# Patient Record
Sex: MALE | Race: WHITE | NOT HISPANIC OR LATINO | Employment: FULL TIME | URBAN - METROPOLITAN AREA
[De-identification: names, ages, dates, MRNs, and addresses within clinical notes are randomized per-mention and may not be internally consistent; named-entity substitution may affect disease eponyms.]

---

## 2019-07-30 ENCOUNTER — TELEPHONE (OUTPATIENT)
Dept: CARDIAC SURGERY | Facility: CLINIC | Age: 54
End: 2019-07-30

## 2019-07-30 ENCOUNTER — APPOINTMENT (OUTPATIENT)
Dept: CARDIOLOGY | Facility: HOSPITAL | Age: 54
End: 2019-07-30

## 2019-07-30 ENCOUNTER — HOSPITAL ENCOUNTER (INPATIENT)
Facility: HOSPITAL | Age: 54
LOS: 6 days | Discharge: HOME HEALTH | End: 2019-08-05
Attending: THORACIC SURGERY (CARDIOTHORACIC VASCULAR SURGERY) | Admitting: THORACIC SURGERY (CARDIOTHORACIC VASCULAR SURGERY)

## 2019-07-30 ENCOUNTER — APPOINTMENT (OUTPATIENT)
Dept: GENERAL RADIOLOGY | Facility: HOSPITAL | Age: 54
End: 2019-07-30

## 2019-07-30 DIAGNOSIS — G47.33 OSA (OBSTRUCTIVE SLEEP APNEA): ICD-10-CM

## 2019-07-30 DIAGNOSIS — Z74.09 IMPAIRED MOBILITY: ICD-10-CM

## 2019-07-30 DIAGNOSIS — I25.110 CORONARY ARTERY DISEASE INVOLVING NATIVE CORONARY ARTERY OF NATIVE HEART WITH UNSTABLE ANGINA PECTORIS (HCC): Primary | ICD-10-CM

## 2019-07-30 PROBLEM — I25.10 CAD (CORONARY ARTERY DISEASE): Status: ACTIVE | Noted: 2019-07-30

## 2019-07-30 LAB
ABO GROUP BLD: NORMAL
ALBUMIN SERPL-MCNC: 3.8 G/DL (ref 3.5–5.2)
ALBUMIN/GLOB SERPL: 1.3 G/DL
ALP SERPL-CCNC: 63 U/L (ref 39–117)
ALT SERPL W P-5'-P-CCNC: 41 U/L (ref 1–41)
ANION GAP SERPL CALCULATED.3IONS-SCNC: 10.8 MMOL/L (ref 5–15)
APTT PPP: 41.5 SECONDS (ref 22.7–35.4)
APTT PPP: 48.8 SECONDS (ref 22.7–35.4)
ARTERIAL PATENCY WRIST A: ABNORMAL
AST SERPL-CCNC: 80 U/L (ref 1–40)
ATMOSPHERIC PRESS: 747.7 MMHG
BACTERIA UR QL AUTO: ABNORMAL /HPF
BASE EXCESS BLDA CALC-SCNC: -1.8 MMOL/L (ref 0–2)
BASOPHILS # BLD AUTO: 0.06 10*3/MM3 (ref 0–0.2)
BASOPHILS NFR BLD AUTO: 0.5 % (ref 0–1.5)
BDY SITE: ABNORMAL
BH CV XLRA MEAS - DIST GSV CALF DIST LEFT: 0.17 CM
BH CV XLRA MEAS - DIST GSV CALF DIST RIGHT: 0.18 CM
BH CV XLRA MEAS - DIST GSV THIGH DIST LEFT: 0.26 CM
BH CV XLRA MEAS - DIST GSV THIGH DIST RIGHT: 0.29 CM
BH CV XLRA MEAS - GSV ANKLE DIST LEFT: 0.17 CM
BH CV XLRA MEAS - GSV ANKLE DIST RIGHT: 0.27 CM
BH CV XLRA MEAS - GSV KNEE DIST LEFT: 0.2 CM
BH CV XLRA MEAS - GSV KNEE DIST RIGHT: 0.29 CM
BH CV XLRA MEAS - GSV ORIGIN DIST LEFT: 0.69 CM
BH CV XLRA MEAS - MID GSV CALF LEFT: 0.15 CM
BH CV XLRA MEAS - MID GSV CALF RIGHT: 0.17 CM
BH CV XLRA MEAS - MID GSV THIGH  LEFT: 0.21 CM
BH CV XLRA MEAS - MID GSV THIGH  RIGHT: 0.38 CM
BH CV XLRA MEAS - MID LSV CALF DIST LEFT: 0.11 CM
BH CV XLRA MEAS - MID LSV CALF DIST RIGHT: 0.06 CM
BH CV XLRA MEAS - PROX GSV CALF DIST LEFT: 0.17 CM
BH CV XLRA MEAS - PROX GSV CALF DIST RIGHT: 0.21 CM
BH CV XLRA MEAS - PROX GSV THIGH  LEFT: 0.35 CM
BH CV XLRA MEAS - PROX GSV THIGH  RIGHT: 0.52 CM
BH CV XLRA MEAS - PROX LSV CALF DIST LEFT: 0.14 CM
BH CV XLRA MEAS - PROX LSV CALF DIST RIGHT: 0.15 CM
BILIRUB SERPL-MCNC: 0.5 MG/DL (ref 0.2–1.2)
BILIRUB UR QL STRIP: NEGATIVE
BLD GP AB SCN SERPL QL: NEGATIVE
BUN BLD-MCNC: 9 MG/DL (ref 6–20)
BUN/CREAT SERPL: 10.5 (ref 7–25)
CALCIUM SPEC-SCNC: 8.7 MG/DL (ref 8.6–10.5)
CHLORIDE SERPL-SCNC: 105 MMOL/L (ref 98–107)
CHOLEST SERPL-MCNC: 213 MG/DL (ref 0–200)
CLARITY UR: CLEAR
CLOSE TME COLL+ADP + EPINEP PNL BLD: 93 %
CO2 SERPL-SCNC: 21.2 MMOL/L (ref 22–29)
COLOR UR: YELLOW
CREAT BLD-MCNC: 0.86 MG/DL (ref 0.76–1.27)
DEPRECATED RDW RBC AUTO: 43.3 FL (ref 37–54)
EOSINOPHIL # BLD AUTO: 0.03 10*3/MM3 (ref 0–0.4)
EOSINOPHIL NFR BLD AUTO: 0.2 % (ref 0.3–6.2)
ERYTHROCYTE [DISTWIDTH] IN BLOOD BY AUTOMATED COUNT: 13.8 % (ref 12.3–15.4)
GFR SERPL CREATININE-BSD FRML MDRD: 93 ML/MIN/1.73
GLOBULIN UR ELPH-MCNC: 3 GM/DL
GLUCOSE BLD-MCNC: 111 MG/DL (ref 65–99)
GLUCOSE UR STRIP-MCNC: NEGATIVE MG/DL
HBA1C MFR BLD: 6.9 % (ref 4.8–5.6)
HCO3 BLDA-SCNC: 22.4 MMOL/L (ref 22–28)
HCT VFR BLD AUTO: 41.6 % (ref 37.5–51)
HDLC SERPL-MCNC: 36 MG/DL (ref 40–60)
HGB BLD-MCNC: 13.2 G/DL (ref 13–17.7)
HGB UR QL STRIP.AUTO: ABNORMAL
HOROWITZ INDEX BLD+IHG-RTO: 21 %
HYALINE CASTS UR QL AUTO: ABNORMAL /LPF
IMM GRANULOCYTES # BLD AUTO: 0.09 10*3/MM3 (ref 0–0.05)
IMM GRANULOCYTES NFR BLD AUTO: 0.7 % (ref 0–0.5)
INR PPP: 1.05 (ref 0.9–1.1)
KETONES UR QL STRIP: NEGATIVE
LDLC SERPL CALC-MCNC: 141 MG/DL (ref 0–100)
LDLC/HDLC SERPL: 3.92 {RATIO}
LEUKOCYTE ESTERASE UR QL STRIP.AUTO: NEGATIVE
LYMPHOCYTES # BLD AUTO: 1.66 10*3/MM3 (ref 0.7–3.1)
LYMPHOCYTES NFR BLD AUTO: 13.7 % (ref 19.6–45.3)
MAGNESIUM SERPL-MCNC: 2 MG/DL (ref 1.6–2.6)
MCH RBC QN AUTO: 27.3 PG (ref 26.6–33)
MCHC RBC AUTO-ENTMCNC: 31.7 G/DL (ref 31.5–35.7)
MCV RBC AUTO: 86 FL (ref 79–97)
MODALITY: ABNORMAL
MONOCYTES # BLD AUTO: 0.62 10*3/MM3 (ref 0.1–0.9)
MONOCYTES NFR BLD AUTO: 5.1 % (ref 5–12)
NEUTROPHILS # BLD AUTO: 9.69 10*3/MM3 (ref 1.7–7)
NEUTROPHILS NFR BLD AUTO: 79.8 % (ref 42.7–76)
NITRITE UR QL STRIP: NEGATIVE
NRBC BLD AUTO-RTO: 0 /100 WBC (ref 0–0.2)
NT-PROBNP SERPL-MCNC: 152.5 PG/ML (ref 5–900)
O2 A-A PPRESDIFF RESPIRATORY: 0.7 MMHG
PCO2 BLDA: 35.9 MM HG (ref 35–45)
PH BLDA: 7.4 PH UNITS (ref 7.35–7.45)
PH UR STRIP.AUTO: <=5 [PH] (ref 5–8)
PLATELET # BLD AUTO: 260 10*3/MM3 (ref 140–450)
PMV BLD AUTO: 10.1 FL (ref 6–12)
PO2 BLDA: 77.6 MM HG (ref 80–100)
POTASSIUM BLD-SCNC: 4.4 MMOL/L (ref 3.5–5.2)
PROT SERPL-MCNC: 6.8 G/DL (ref 6–8.5)
PROT UR QL STRIP: NEGATIVE
PROTHROMBIN TIME: 13.4 SECONDS (ref 11.7–14.2)
RBC # BLD AUTO: 4.84 10*6/MM3 (ref 4.14–5.8)
RBC # UR: ABNORMAL /HPF
REF LAB TEST METHOD: ABNORMAL
RH BLD: POSITIVE
SAO2 % BLDCOA: 95.5 % (ref 92–99)
SODIUM BLD-SCNC: 137 MMOL/L (ref 136–145)
SP GR UR STRIP: >=1.03 (ref 1–1.03)
SQUAMOUS #/AREA URNS HPF: ABNORMAL /HPF
T&S EXPIRATION DATE: NORMAL
TOTAL RATE: 15 BREATHS/MINUTE
TRIGL SERPL-MCNC: 179 MG/DL (ref 0–150)
TROPONIN T SERPL-MCNC: 0.64 NG/ML (ref 0–0.03)
TROPONIN T SERPL-MCNC: 1.14 NG/ML (ref 0–0.03)
TSH SERPL DL<=0.05 MIU/L-ACNC: 1.17 MIU/ML (ref 0.27–4.2)
UROBILINOGEN UR QL STRIP: ABNORMAL
VLDLC SERPL-MCNC: 35.8 MG/DL (ref 5–40)
WBC NRBC COR # BLD: 12.15 10*3/MM3 (ref 3.4–10.8)
WBC UR QL AUTO: ABNORMAL /HPF

## 2019-07-30 PROCEDURE — 82803 BLOOD GASES ANY COMBINATION: CPT

## 2019-07-30 PROCEDURE — 25010000002 HEPARIN (PORCINE) PER 1000 UNITS: Performed by: PHYSICIAN ASSISTANT

## 2019-07-30 PROCEDURE — 86900 BLOOD TYPING SEROLOGIC ABO: CPT | Performed by: THORACIC SURGERY (CARDIOTHORACIC VASCULAR SURGERY)

## 2019-07-30 PROCEDURE — 85576 BLOOD PLATELET AGGREGATION: CPT | Performed by: THORACIC SURGERY (CARDIOTHORACIC VASCULAR SURGERY)

## 2019-07-30 PROCEDURE — 93970 EXTREMITY STUDY: CPT

## 2019-07-30 PROCEDURE — 99223 1ST HOSP IP/OBS HIGH 75: CPT | Performed by: PHYSICIAN ASSISTANT

## 2019-07-30 PROCEDURE — 25010000002 MORPHINE PER 10 MG: Performed by: THORACIC SURGERY (CARDIOTHORACIC VASCULAR SURGERY)

## 2019-07-30 PROCEDURE — 86901 BLOOD TYPING SEROLOGIC RH(D): CPT | Performed by: THORACIC SURGERY (CARDIOTHORACIC VASCULAR SURGERY)

## 2019-07-30 PROCEDURE — 84484 ASSAY OF TROPONIN QUANT: CPT | Performed by: THORACIC SURGERY (CARDIOTHORACIC VASCULAR SURGERY)

## 2019-07-30 PROCEDURE — 85730 THROMBOPLASTIN TIME PARTIAL: CPT | Performed by: THORACIC SURGERY (CARDIOTHORACIC VASCULAR SURGERY)

## 2019-07-30 PROCEDURE — 80053 COMPREHEN METABOLIC PANEL: CPT | Performed by: THORACIC SURGERY (CARDIOTHORACIC VASCULAR SURGERY)

## 2019-07-30 PROCEDURE — 83036 HEMOGLOBIN GLYCOSYLATED A1C: CPT | Performed by: THORACIC SURGERY (CARDIOTHORACIC VASCULAR SURGERY)

## 2019-07-30 PROCEDURE — 86920 COMPATIBILITY TEST SPIN: CPT

## 2019-07-30 PROCEDURE — 84443 ASSAY THYROID STIM HORMONE: CPT | Performed by: THORACIC SURGERY (CARDIOTHORACIC VASCULAR SURGERY)

## 2019-07-30 PROCEDURE — 93880 EXTRACRANIAL BILAT STUDY: CPT

## 2019-07-30 PROCEDURE — 85025 COMPLETE CBC W/AUTO DIFF WBC: CPT | Performed by: THORACIC SURGERY (CARDIOTHORACIC VASCULAR SURGERY)

## 2019-07-30 PROCEDURE — 71045 X-RAY EXAM CHEST 1 VIEW: CPT

## 2019-07-30 PROCEDURE — 83735 ASSAY OF MAGNESIUM: CPT | Performed by: THORACIC SURGERY (CARDIOTHORACIC VASCULAR SURGERY)

## 2019-07-30 PROCEDURE — 93005 ELECTROCARDIOGRAM TRACING: CPT | Performed by: THORACIC SURGERY (CARDIOTHORACIC VASCULAR SURGERY)

## 2019-07-30 PROCEDURE — 93010 ELECTROCARDIOGRAM REPORT: CPT | Performed by: INTERNAL MEDICINE

## 2019-07-30 PROCEDURE — 86850 RBC ANTIBODY SCREEN: CPT | Performed by: THORACIC SURGERY (CARDIOTHORACIC VASCULAR SURGERY)

## 2019-07-30 PROCEDURE — 80061 LIPID PANEL: CPT | Performed by: THORACIC SURGERY (CARDIOTHORACIC VASCULAR SURGERY)

## 2019-07-30 PROCEDURE — 36600 WITHDRAWAL OF ARTERIAL BLOOD: CPT

## 2019-07-30 PROCEDURE — 81001 URINALYSIS AUTO W/SCOPE: CPT | Performed by: THORACIC SURGERY (CARDIOTHORACIC VASCULAR SURGERY)

## 2019-07-30 PROCEDURE — 85610 PROTHROMBIN TIME: CPT | Performed by: THORACIC SURGERY (CARDIOTHORACIC VASCULAR SURGERY)

## 2019-07-30 PROCEDURE — 83880 ASSAY OF NATRIURETIC PEPTIDE: CPT | Performed by: THORACIC SURGERY (CARDIOTHORACIC VASCULAR SURGERY)

## 2019-07-30 PROCEDURE — 25010000002 HYDRALAZINE PER 20 MG: Performed by: PHYSICIAN ASSISTANT

## 2019-07-30 RX ORDER — HYDRALAZINE HYDROCHLORIDE 20 MG/ML
10 INJECTION INTRAMUSCULAR; INTRAVENOUS EVERY 4 HOURS PRN
Status: DISCONTINUED | OUTPATIENT
Start: 2019-07-30 | End: 2019-08-01

## 2019-07-30 RX ORDER — HYDRALAZINE HYDROCHLORIDE 20 MG/ML
20 INJECTION INTRAMUSCULAR; INTRAVENOUS EVERY 4 HOURS PRN
Status: DISCONTINUED | OUTPATIENT
Start: 2019-07-30 | End: 2019-08-01

## 2019-07-30 RX ORDER — NITROGLYCERIN 20 MG/100ML
10-50 INJECTION INTRAVENOUS
Status: DISCONTINUED | OUTPATIENT
Start: 2019-07-30 | End: 2019-08-01

## 2019-07-30 RX ORDER — ASPIRIN 325 MG
325 TABLET ORAL DAILY
Status: DISCONTINUED | OUTPATIENT
Start: 2019-07-30 | End: 2019-08-01

## 2019-07-30 RX ORDER — HEPARIN SODIUM 10000 [USP'U]/100ML
12 INJECTION, SOLUTION INTRAVENOUS
Status: DISCONTINUED | OUTPATIENT
Start: 2019-07-30 | End: 2019-08-01

## 2019-07-30 RX ORDER — ROSUVASTATIN CALCIUM 20 MG/1
20 TABLET, COATED ORAL NIGHTLY
Status: DISCONTINUED | OUTPATIENT
Start: 2019-07-30 | End: 2019-08-01

## 2019-07-30 RX ORDER — SENNA AND DOCUSATE SODIUM 50; 8.6 MG/1; MG/1
1 TABLET, FILM COATED ORAL NIGHTLY PRN
Status: DISCONTINUED | OUTPATIENT
Start: 2019-07-30 | End: 2019-08-01

## 2019-07-30 RX ORDER — SODIUM CHLORIDE 0.9 % (FLUSH) 0.9 %
3 SYRINGE (ML) INJECTION EVERY 12 HOURS SCHEDULED
Status: DISCONTINUED | OUTPATIENT
Start: 2019-07-30 | End: 2019-08-01

## 2019-07-30 RX ORDER — CALCIUM CARBONATE 200(500)MG
1 TABLET,CHEWABLE ORAL DAILY PRN
Status: DISCONTINUED | OUTPATIENT
Start: 2019-07-30 | End: 2019-08-01

## 2019-07-30 RX ORDER — ALPRAZOLAM 0.25 MG/1
0.25 TABLET ORAL EVERY 8 HOURS PRN
Status: DISCONTINUED | OUTPATIENT
Start: 2019-07-30 | End: 2019-08-01

## 2019-07-30 RX ORDER — TEMAZEPAM 15 MG/1
15 CAPSULE ORAL NIGHTLY PRN
Status: DISCONTINUED | OUTPATIENT
Start: 2019-07-30 | End: 2019-08-01

## 2019-07-30 RX ORDER — NITROGLYCERIN 0.4 MG/1
0.4 TABLET SUBLINGUAL
Status: DISCONTINUED | OUTPATIENT
Start: 2019-07-30 | End: 2019-08-01

## 2019-07-30 RX ORDER — ACETAMINOPHEN 325 MG/1
650 TABLET ORAL EVERY 4 HOURS PRN
Status: DISCONTINUED | OUTPATIENT
Start: 2019-07-30 | End: 2019-08-01

## 2019-07-30 RX ORDER — CHLORHEXIDINE GLUCONATE 0.12 MG/ML
15 RINSE ORAL EVERY 12 HOURS SCHEDULED
Status: DISPENSED | OUTPATIENT
Start: 2019-07-30 | End: 2019-07-31

## 2019-07-30 RX ORDER — MORPHINE SULFATE 2 MG/ML
1 INJECTION, SOLUTION INTRAMUSCULAR; INTRAVENOUS EVERY 4 HOURS PRN
Status: DISCONTINUED | OUTPATIENT
Start: 2019-07-30 | End: 2019-08-01

## 2019-07-30 RX ORDER — NALOXONE HCL 0.4 MG/ML
0.4 VIAL (ML) INJECTION
Status: DISCONTINUED | OUTPATIENT
Start: 2019-07-30 | End: 2019-08-01

## 2019-07-30 RX ORDER — METOPROLOL TARTRATE 50 MG/1
50 TABLET, FILM COATED ORAL EVERY 12 HOURS SCHEDULED
Status: DISCONTINUED | OUTPATIENT
Start: 2019-07-31 | End: 2019-07-31

## 2019-07-30 RX ORDER — SODIUM CHLORIDE 0.9 % (FLUSH) 0.9 %
3-10 SYRINGE (ML) INJECTION AS NEEDED
Status: DISCONTINUED | OUTPATIENT
Start: 2019-07-30 | End: 2019-08-01

## 2019-07-30 RX ORDER — ONDANSETRON 2 MG/ML
4 INJECTION INTRAMUSCULAR; INTRAVENOUS EVERY 6 HOURS PRN
Status: DISCONTINUED | OUTPATIENT
Start: 2019-07-30 | End: 2019-08-01

## 2019-07-30 RX ADMIN — HYDRALAZINE HYDROCHLORIDE 10 MG: 20 INJECTION INTRAMUSCULAR; INTRAVENOUS at 22:15

## 2019-07-30 RX ADMIN — METOPROLOL TARTRATE 25 MG: 25 TABLET ORAL at 18:40

## 2019-07-30 RX ADMIN — HEPARIN SODIUM 18 UNITS/KG/HR: 10000 INJECTION, SOLUTION INTRAVENOUS at 23:52

## 2019-07-30 RX ADMIN — CHLORHEXIDINE GLUCONATE 15 ML: 1.2 RINSE ORAL at 18:50

## 2019-07-30 RX ADMIN — HEPARIN SODIUM 12 UNITS/KG/HR: 10000 INJECTION, SOLUTION INTRAVENOUS at 16:10

## 2019-07-30 RX ADMIN — ROSUVASTATIN CALCIUM 20 MG: 20 TABLET, FILM COATED ORAL at 20:58

## 2019-07-30 RX ADMIN — SODIUM CHLORIDE, PRESERVATIVE FREE 3 ML: 5 INJECTION INTRAVENOUS at 20:58

## 2019-07-30 RX ADMIN — ALPRAZOLAM 0.25 MG: 0.25 TABLET ORAL at 23:20

## 2019-07-30 RX ADMIN — MORPHINE SULFATE 1 MG: 2 INJECTION, SOLUTION INTRAMUSCULAR; INTRAVENOUS at 23:05

## 2019-07-30 RX ADMIN — ASPIRIN 325 MG: 325 TABLET ORAL at 18:41

## 2019-07-30 RX ADMIN — HYDRALAZINE HYDROCHLORIDE 10 MG: 20 INJECTION INTRAMUSCULAR; INTRAVENOUS at 17:56

## 2019-07-30 RX ADMIN — NITROGLYCERIN 35 MCG/MIN: 20 INJECTION INTRAVENOUS at 16:09

## 2019-07-31 ENCOUNTER — ANESTHESIA EVENT (OUTPATIENT)
Dept: PERIOP | Facility: HOSPITAL | Age: 54
End: 2019-07-31

## 2019-07-31 LAB
ACT BLD: 114 SECONDS (ref 82–152)
ANION GAP SERPL CALCULATED.3IONS-SCNC: 12.8 MMOL/L (ref 5–15)
APTT PPP: 57.1 SECONDS (ref 22.7–35.4)
APTT PPP: 75.3 SECONDS (ref 22.7–35.4)
BASOPHILS # BLD AUTO: 0.07 10*3/MM3 (ref 0–0.2)
BASOPHILS NFR BLD AUTO: 0.4 % (ref 0–1.5)
BH CV XLRA MEAS LEFT CCA RATIO VEL: -93.8 CM/SEC
BH CV XLRA MEAS LEFT DIST CCA EDV: -25.2 CM/SEC
BH CV XLRA MEAS LEFT DIST CCA PSV: -93.8 CM/SEC
BH CV XLRA MEAS LEFT DIST ICA EDV: -30.1 CM/SEC
BH CV XLRA MEAS LEFT DIST ICA PSV: -73.5 CM/SEC
BH CV XLRA MEAS LEFT ICA RATIO VEL: -96.6 CM/SEC
BH CV XLRA MEAS LEFT ICA/CCA RATIO: 1
BH CV XLRA MEAS LEFT MID ICA EDV: -29.4 CM/SEC
BH CV XLRA MEAS LEFT MID ICA PSV: -82.2 CM/SEC
BH CV XLRA MEAS LEFT PROX CCA EDV: 25.1 CM/SEC
BH CV XLRA MEAS LEFT PROX CCA PSV: 111 CM/SEC
BH CV XLRA MEAS LEFT PROX ECA EDV: -17.3 CM/SEC
BH CV XLRA MEAS LEFT PROX ECA PSV: -121 CM/SEC
BH CV XLRA MEAS LEFT PROX ICA EDV: -25.1 CM/SEC
BH CV XLRA MEAS LEFT PROX ICA PSV: -96.6 CM/SEC
BH CV XLRA MEAS LEFT PROX SCLA PSV: 122 CM/SEC
BH CV XLRA MEAS LEFT VERTEBRAL A EDV: -12.2 CM/SEC
BH CV XLRA MEAS LEFT VERTEBRAL A PSV: -42.4 CM/SEC
BH CV XLRA MEAS RIGHT CCA RATIO VEL: -100 CM/SEC
BH CV XLRA MEAS RIGHT DIST CCA EDV: -24.6 CM/SEC
BH CV XLRA MEAS RIGHT DIST CCA PSV: -100 CM/SEC
BH CV XLRA MEAS RIGHT DIST ICA EDV: -12.2 CM/SEC
BH CV XLRA MEAS RIGHT DIST ICA PSV: -36.1 CM/SEC
BH CV XLRA MEAS RIGHT ICA RATIO VEL: -99.7 CM/SEC
BH CV XLRA MEAS RIGHT ICA/CCA RATIO: 1
BH CV XLRA MEAS RIGHT MID ICA EDV: -13 CM/SEC
BH CV XLRA MEAS RIGHT MID ICA PSV: -48.7 CM/SEC
BH CV XLRA MEAS RIGHT PROX CCA EDV: 18.2 CM/SEC
BH CV XLRA MEAS RIGHT PROX CCA PSV: 80.9 CM/SEC
BH CV XLRA MEAS RIGHT PROX ECA EDV: -17.6 CM/SEC
BH CV XLRA MEAS RIGHT PROX ECA PSV: -110 CM/SEC
BH CV XLRA MEAS RIGHT PROX ICA EDV: -21.7 CM/SEC
BH CV XLRA MEAS RIGHT PROX ICA PSV: -99.7 CM/SEC
BH CV XLRA MEAS RIGHT PROX SCLA PSV: 128 CM/SEC
BH CV XLRA MEAS RIGHT VERTEBRAL A EDV: 7.5 CM/SEC
BH CV XLRA MEAS RIGHT VERTEBRAL A PSV: 25.1 CM/SEC
BUN BLD-MCNC: 9 MG/DL (ref 6–20)
BUN/CREAT SERPL: 11 (ref 7–25)
CALCIUM SPEC-SCNC: 8.8 MG/DL (ref 8.6–10.5)
CHLORIDE SERPL-SCNC: 102 MMOL/L (ref 98–107)
CO2 SERPL-SCNC: 20.2 MMOL/L (ref 22–29)
CREAT BLD-MCNC: 0.82 MG/DL (ref 0.76–1.27)
DEPRECATED RDW RBC AUTO: 44.5 FL (ref 37–54)
EOSINOPHIL # BLD AUTO: 0.03 10*3/MM3 (ref 0–0.4)
EOSINOPHIL NFR BLD AUTO: 0.2 % (ref 0.3–6.2)
ERYTHROCYTE [DISTWIDTH] IN BLOOD BY AUTOMATED COUNT: 13.9 % (ref 12.3–15.4)
GFR SERPL CREATININE-BSD FRML MDRD: 98 ML/MIN/1.73
GLUCOSE BLD-MCNC: 140 MG/DL (ref 65–99)
HCT VFR BLD AUTO: 40.1 % (ref 37.5–51)
HGB BLD-MCNC: 12.7 G/DL (ref 13–17.7)
IMM GRANULOCYTES # BLD AUTO: 0.08 10*3/MM3 (ref 0–0.05)
IMM GRANULOCYTES NFR BLD AUTO: 0.5 % (ref 0–0.5)
LYMPHOCYTES # BLD AUTO: 1.93 10*3/MM3 (ref 0.7–3.1)
LYMPHOCYTES NFR BLD AUTO: 11.5 % (ref 19.6–45.3)
MCH RBC QN AUTO: 27.8 PG (ref 26.6–33)
MCHC RBC AUTO-ENTMCNC: 31.7 G/DL (ref 31.5–35.7)
MCV RBC AUTO: 87.7 FL (ref 79–97)
MONOCYTES # BLD AUTO: 1.1 10*3/MM3 (ref 0.1–0.9)
MONOCYTES NFR BLD AUTO: 6.5 % (ref 5–12)
NEUTROPHILS # BLD AUTO: 13.59 10*3/MM3 (ref 1.7–7)
NEUTROPHILS NFR BLD AUTO: 80.9 % (ref 42.7–76)
NRBC BLD AUTO-RTO: 0 /100 WBC (ref 0–0.2)
PLATELET # BLD AUTO: 245 10*3/MM3 (ref 140–450)
PMV BLD AUTO: 10.1 FL (ref 6–12)
POTASSIUM BLD-SCNC: 4.1 MMOL/L (ref 3.5–5.2)
RBC # BLD AUTO: 4.57 10*6/MM3 (ref 4.14–5.8)
RIGHT ARM BP: NORMAL MMHG
SODIUM BLD-SCNC: 135 MMOL/L (ref 136–145)
TROPONIN T SERPL-MCNC: 1.69 NG/ML (ref 0–0.03)
TROPONIN T SERPL-MCNC: 1.83 NG/ML (ref 0–0.03)
WBC NRBC COR # BLD: 16.8 10*3/MM3 (ref 3.4–10.8)

## 2019-07-31 PROCEDURE — 99024 POSTOP FOLLOW-UP VISIT: CPT | Performed by: THORACIC SURGERY (CARDIOTHORACIC VASCULAR SURGERY)

## 2019-07-31 PROCEDURE — 25010000002 HYDRALAZINE PER 20 MG: Performed by: INTERNAL MEDICINE

## 2019-07-31 PROCEDURE — 85347 COAGULATION TIME ACTIVATED: CPT

## 2019-07-31 PROCEDURE — 84484 ASSAY OF TROPONIN QUANT: CPT | Performed by: THORACIC SURGERY (CARDIOTHORACIC VASCULAR SURGERY)

## 2019-07-31 PROCEDURE — 99255 IP/OBS CONSLTJ NEW/EST HI 80: CPT | Performed by: INTERNAL MEDICINE

## 2019-07-31 PROCEDURE — 25010000002 MORPHINE PER 10 MG: Performed by: THORACIC SURGERY (CARDIOTHORACIC VASCULAR SURGERY)

## 2019-07-31 PROCEDURE — 85730 THROMBOPLASTIN TIME PARTIAL: CPT | Performed by: THORACIC SURGERY (CARDIOTHORACIC VASCULAR SURGERY)

## 2019-07-31 PROCEDURE — 85025 COMPLETE CBC W/AUTO DIFF WBC: CPT | Performed by: PHYSICIAN ASSISTANT

## 2019-07-31 PROCEDURE — 80048 BASIC METABOLIC PNL TOTAL CA: CPT | Performed by: THORACIC SURGERY (CARDIOTHORACIC VASCULAR SURGERY)

## 2019-07-31 PROCEDURE — 25010000002 HEPARIN (PORCINE) PER 1000 UNITS: Performed by: PHYSICIAN ASSISTANT

## 2019-07-31 RX ORDER — METOPROLOL TARTRATE 100 MG/1
100 TABLET ORAL EVERY 12 HOURS SCHEDULED
Status: DISCONTINUED | OUTPATIENT
Start: 2019-07-31 | End: 2019-08-01

## 2019-07-31 RX ORDER — MORPHINE SULFATE 2 MG/ML
2 INJECTION, SOLUTION INTRAMUSCULAR; INTRAVENOUS ONCE
Status: COMPLETED | OUTPATIENT
Start: 2019-07-31 | End: 2019-07-31

## 2019-07-31 RX ORDER — METOPROLOL TARTRATE 50 MG/1
50 TABLET, FILM COATED ORAL ONCE
Status: COMPLETED | OUTPATIENT
Start: 2019-07-31 | End: 2019-07-31

## 2019-07-31 RX ADMIN — MORPHINE SULFATE 2 MG: 2 INJECTION, SOLUTION INTRAMUSCULAR; INTRAVENOUS at 00:51

## 2019-07-31 RX ADMIN — METOPROLOL TARTRATE 100 MG: 100 TABLET, FILM COATED ORAL at 20:59

## 2019-07-31 RX ADMIN — TEMAZEPAM 15 MG: 15 CAPSULE ORAL at 00:50

## 2019-07-31 RX ADMIN — HEPARIN SODIUM 18 UNITS/KG/HR: 10000 INJECTION, SOLUTION INTRAVENOUS at 18:01

## 2019-07-31 RX ADMIN — NITROGLYCERIN 25 MCG/MIN: 20 INJECTION INTRAVENOUS at 17:19

## 2019-07-31 RX ADMIN — ALPRAZOLAM 0.25 MG: 0.25 TABLET ORAL at 21:26

## 2019-07-31 RX ADMIN — Medication 1 TABLET: at 06:24

## 2019-07-31 RX ADMIN — ROSUVASTATIN CALCIUM 20 MG: 20 TABLET, FILM COATED ORAL at 20:59

## 2019-07-31 RX ADMIN — METOPROLOL TARTRATE 50 MG: 50 TABLET, FILM COATED ORAL at 14:42

## 2019-07-31 RX ADMIN — METOPROLOL TARTRATE 50 MG: 50 TABLET, FILM COATED ORAL at 00:50

## 2019-07-31 RX ADMIN — METOPROLOL TARTRATE 50 MG: 50 TABLET, FILM COATED ORAL at 08:18

## 2019-07-31 RX ADMIN — HYDRALAZINE HYDROCHLORIDE 20 MG: 20 INJECTION INTRAMUSCULAR; INTRAVENOUS at 17:19

## 2019-07-31 RX ADMIN — ASPIRIN 325 MG: 325 TABLET ORAL at 08:18

## 2019-07-31 RX ADMIN — SODIUM CHLORIDE, PRESERVATIVE FREE 3 ML: 5 INJECTION INTRAVENOUS at 21:10

## 2019-07-31 RX ADMIN — TEMAZEPAM 15 MG: 15 CAPSULE ORAL at 21:26

## 2019-07-31 RX ADMIN — SODIUM CHLORIDE, PRESERVATIVE FREE 3 ML: 5 INJECTION INTRAVENOUS at 08:19

## 2019-08-01 ENCOUNTER — ANCILLARY PROCEDURE (OUTPATIENT)
Dept: PERIOP | Facility: HOSPITAL | Age: 54
End: 2019-08-01

## 2019-08-01 ENCOUNTER — ANESTHESIA (OUTPATIENT)
Dept: PERIOP | Facility: HOSPITAL | Age: 54
End: 2019-08-01

## 2019-08-01 ENCOUNTER — APPOINTMENT (OUTPATIENT)
Dept: GENERAL RADIOLOGY | Facility: HOSPITAL | Age: 54
End: 2019-08-01

## 2019-08-01 LAB
ACT BLD: 109 SECONDS (ref 82–152)
ACT BLD: 136 SECONDS (ref 82–152)
ACT BLD: 296 SECONDS (ref 82–152)
ACT BLD: 307 SECONDS (ref 82–152)
ACT BLD: 307 SECONDS (ref 82–152)
ACT BLD: 329 SECONDS (ref 82–152)
ACT BLD: 329 SECONDS (ref 82–152)
ACT BLD: 356 SECONDS (ref 82–152)
ALBUMIN SERPL-MCNC: 4.3 G/DL (ref 3.5–5.2)
ALBUMIN SERPL-MCNC: 4.6 G/DL (ref 3.5–5.2)
ANION GAP SERPL CALCULATED.3IONS-SCNC: 12.4 MMOL/L (ref 5–15)
ANION GAP SERPL CALCULATED.3IONS-SCNC: 16.2 MMOL/L (ref 5–15)
APTT PPP: 29 SECONDS (ref 22.7–35.4)
APTT PPP: 64 SECONDS (ref 22.7–35.4)
ARTERIAL PATENCY WRIST A: ABNORMAL
ATMOSPHERIC PRESS: 755.9 MMHG
ATMOSPHERIC PRESS: 757 MMHG
ATMOSPHERIC PRESS: 759.1 MMHG
BASE EXCESS BLDA CALC-SCNC: -1 MMOL/L (ref -5–5)
BASE EXCESS BLDA CALC-SCNC: -1.8 MMOL/L (ref 0–2)
BASE EXCESS BLDA CALC-SCNC: -2.7 MMOL/L (ref 0–2)
BASE EXCESS BLDA CALC-SCNC: -3 MMOL/L (ref -5–5)
BASE EXCESS BLDA CALC-SCNC: -4 MMOL/L (ref -5–5)
BASE EXCESS BLDA CALC-SCNC: -4 MMOL/L (ref -5–5)
BASE EXCESS BLDA CALC-SCNC: -6 MMOL/L (ref -5–5)
BASE EXCESS BLDA CALC-SCNC: -6.1 MMOL/L (ref 0–2)
BASE EXCESS BLDA CALC-SCNC: 0 MMOL/L (ref -5–5)
BASE EXCESS BLDA CALC-SCNC: 1 MMOL/L (ref -5–5)
BASE EXCESS BLDA CALC-SCNC: 1 MMOL/L (ref -5–5)
BASOPHILS # BLD AUTO: 0.06 10*3/MM3 (ref 0–0.2)
BASOPHILS # BLD AUTO: 0.11 10*3/MM3 (ref 0–0.2)
BASOPHILS NFR BLD AUTO: 0.3 % (ref 0–1.5)
BASOPHILS NFR BLD AUTO: 0.5 % (ref 0–1.5)
BDY SITE: ABNORMAL
BUN BLD-MCNC: 11 MG/DL (ref 6–20)
BUN BLD-MCNC: 13 MG/DL (ref 6–20)
BUN/CREAT SERPL: 7.6 (ref 7–25)
BUN/CREAT SERPL: 9.6 (ref 7–25)
CA-I BLD-MCNC: 5.4 MG/DL (ref 4.6–5.4)
CA-I SERPL ISE-MCNC: 1.35 MMOL/L (ref 1.15–1.35)
CALCIUM SPEC-SCNC: 8.8 MG/DL (ref 8.6–10.5)
CALCIUM SPEC-SCNC: 9.1 MG/DL (ref 8.6–10.5)
CHLORIDE SERPL-SCNC: 104 MMOL/L (ref 98–107)
CHLORIDE SERPL-SCNC: 99 MMOL/L (ref 98–107)
CO2 BLDA-SCNC: 23 MMOL/L (ref 24–29)
CO2 BLDA-SCNC: 24 MMOL/L (ref 24–29)
CO2 BLDA-SCNC: 25 MMOL/L (ref 24–29)
CO2 BLDA-SCNC: 25 MMOL/L (ref 24–29)
CO2 BLDA-SCNC: 27 MMOL/L (ref 24–29)
CO2 BLDA-SCNC: 27 MMOL/L (ref 24–29)
CO2 BLDA-SCNC: 28 MMOL/L (ref 24–29)
CO2 BLDA-SCNC: 29 MMOL/L (ref 24–29)
CO2 SERPL-SCNC: 21.6 MMOL/L (ref 22–29)
CO2 SERPL-SCNC: 21.8 MMOL/L (ref 22–29)
CREAT BLD-MCNC: 1.36 MG/DL (ref 0.76–1.27)
CREAT BLD-MCNC: 1.44 MG/DL (ref 0.76–1.27)
DEPRECATED RDW RBC AUTO: 44.7 FL (ref 37–54)
DEPRECATED RDW RBC AUTO: 45.8 FL (ref 37–54)
DEPRECATED RDW RBC AUTO: 46.1 FL (ref 37–54)
EOSINOPHIL # BLD AUTO: 0.11 10*3/MM3 (ref 0–0.4)
EOSINOPHIL # BLD AUTO: 0.15 10*3/MM3 (ref 0–0.4)
EOSINOPHIL NFR BLD AUTO: 0.4 % (ref 0.3–6.2)
EOSINOPHIL NFR BLD AUTO: 0.9 % (ref 0.3–6.2)
ERYTHROCYTE [DISTWIDTH] IN BLOOD BY AUTOMATED COUNT: 14 % (ref 12.3–15.4)
ERYTHROCYTE [DISTWIDTH] IN BLOOD BY AUTOMATED COUNT: 14 % (ref 12.3–15.4)
ERYTHROCYTE [DISTWIDTH] IN BLOOD BY AUTOMATED COUNT: 14.3 % (ref 12.3–15.4)
FIBRINOGEN PPP-MCNC: 353 MG/DL (ref 219–464)
GFR SERPL CREATININE-BSD FRML MDRD: 51 ML/MIN/1.73
GFR SERPL CREATININE-BSD FRML MDRD: 55 ML/MIN/1.73
GLUCOSE BLD-MCNC: 165 MG/DL (ref 65–99)
GLUCOSE BLD-MCNC: 190 MG/DL (ref 65–99)
GLUCOSE BLDC GLUCOMTR-MCNC: 134 MG/DL (ref 70–130)
GLUCOSE BLDC GLUCOMTR-MCNC: 138 MG/DL (ref 70–130)
GLUCOSE BLDC GLUCOMTR-MCNC: 144 MG/DL (ref 70–130)
GLUCOSE BLDC GLUCOMTR-MCNC: 150 MG/DL (ref 70–130)
GLUCOSE BLDC GLUCOMTR-MCNC: 150 MG/DL (ref 70–130)
GLUCOSE BLDC GLUCOMTR-MCNC: 151 MG/DL (ref 70–130)
GLUCOSE BLDC GLUCOMTR-MCNC: 161 MG/DL (ref 70–130)
GLUCOSE BLDC GLUCOMTR-MCNC: 161 MG/DL (ref 70–130)
GLUCOSE BLDC GLUCOMTR-MCNC: 162 MG/DL (ref 70–130)
GLUCOSE BLDC GLUCOMTR-MCNC: 164 MG/DL (ref 70–130)
GLUCOSE BLDC GLUCOMTR-MCNC: 165 MG/DL (ref 70–130)
GLUCOSE BLDC GLUCOMTR-MCNC: 165 MG/DL (ref 70–130)
GLUCOSE BLDC GLUCOMTR-MCNC: 168 MG/DL (ref 70–130)
GLUCOSE BLDC GLUCOMTR-MCNC: 171 MG/DL (ref 70–130)
GLUCOSE BLDC GLUCOMTR-MCNC: 176 MG/DL (ref 70–130)
GLUCOSE BLDC GLUCOMTR-MCNC: 180 MG/DL (ref 70–130)
GLUCOSE BLDC GLUCOMTR-MCNC: 182 MG/DL (ref 70–130)
GLUCOSE BLDC GLUCOMTR-MCNC: 183 MG/DL (ref 70–130)
GLUCOSE BLDC GLUCOMTR-MCNC: 184 MG/DL (ref 70–130)
GLUCOSE BLDC GLUCOMTR-MCNC: 194 MG/DL (ref 70–130)
HCO3 BLDA-SCNC: 20.4 MMOL/L (ref 22–28)
HCO3 BLDA-SCNC: 21.5 MMOL/L (ref 22–26)
HCO3 BLDA-SCNC: 22.3 MMOL/L (ref 22–26)
HCO3 BLDA-SCNC: 22.4 MMOL/L (ref 22–28)
HCO3 BLDA-SCNC: 22.5 MMOL/L (ref 22–28)
HCO3 BLDA-SCNC: 23.2 MMOL/L (ref 22–26)
HCO3 BLDA-SCNC: 23.9 MMOL/L (ref 22–26)
HCO3 BLDA-SCNC: 25.6 MMOL/L (ref 22–26)
HCO3 BLDA-SCNC: 26 MMOL/L (ref 22–26)
HCO3 BLDA-SCNC: 26.4 MMOL/L (ref 22–26)
HCO3 BLDA-SCNC: 27.1 MMOL/L (ref 22–26)
HCT VFR BLD AUTO: 35.3 % (ref 37.5–51)
HCT VFR BLD AUTO: 36.8 % (ref 37.5–51)
HCT VFR BLD AUTO: 37.6 % (ref 37.5–51)
HCT VFR BLDA CALC: 31 % (ref 38–51)
HCT VFR BLDA CALC: 32 % (ref 38–51)
HCT VFR BLDA CALC: 33 % (ref 38–51)
HCT VFR BLDA CALC: 37 % (ref 38–51)
HGB BLD-MCNC: 11.2 G/DL (ref 13–17.7)
HGB BLD-MCNC: 11.4 G/DL (ref 13–17.7)
HGB BLD-MCNC: 12.1 G/DL (ref 13–17.7)
HGB BLDA-MCNC: 10.5 G/DL (ref 12–17)
HGB BLDA-MCNC: 10.9 G/DL (ref 12–17)
HGB BLDA-MCNC: 11.2 G/DL (ref 12–17)
HGB BLDA-MCNC: 12.6 G/DL (ref 12–17)
HOROWITZ INDEX BLD+IHG-RTO: 100 %
HOROWITZ INDEX BLD+IHG-RTO: 50 %
HOROWITZ INDEX BLD+IHG-RTO: 60 %
IMM GRANULOCYTES # BLD AUTO: 0.1 10*3/MM3 (ref 0–0.05)
IMM GRANULOCYTES NFR BLD AUTO: 0.8 % (ref 0–0.5)
INR PPP: 1.37 (ref 0.9–1.1)
LYMPHOCYTES # BLD AUTO: 1.93 10*3/MM3 (ref 0.7–3.1)
LYMPHOCYTES # BLD AUTO: 4.95 10*3/MM3 (ref 0.7–3.1)
LYMPHOCYTES NFR BLD AUTO: 14.7 % (ref 19.6–45.3)
LYMPHOCYTES NFR BLD AUTO: 15.6 % (ref 19.6–45.3)
MAGNESIUM SERPL-MCNC: 2.7 MG/DL (ref 1.6–2.6)
MAGNESIUM SERPL-MCNC: 3.1 MG/DL (ref 1.6–2.6)
MCH RBC QN AUTO: 27.6 PG (ref 26.6–33)
MCH RBC QN AUTO: 28.1 PG (ref 26.6–33)
MCH RBC QN AUTO: 28.4 PG (ref 26.6–33)
MCHC RBC AUTO-ENTMCNC: 31 G/DL (ref 31.5–35.7)
MCHC RBC AUTO-ENTMCNC: 31.2 G/DL (ref 31.5–35.7)
MCHC RBC AUTO-ENTMCNC: 32.2 G/DL (ref 31.5–35.7)
MCV RBC AUTO: 87.4 FL (ref 79–97)
MCV RBC AUTO: 89.1 FL (ref 79–97)
MCV RBC AUTO: 89.4 FL (ref 79–97)
MODALITY: ABNORMAL
MONOCYTES # BLD AUTO: 1.12 10*3/MM3 (ref 0.1–0.9)
MONOCYTES # BLD AUTO: 2.53 10*3/MM3 (ref 0.1–0.9)
MONOCYTES NFR BLD AUTO: 7.5 % (ref 5–12)
MONOCYTES NFR BLD AUTO: 9 % (ref 5–12)
NEUTROPHILS # BLD AUTO: 25.24 10*3/MM3 (ref 1.7–7)
NEUTROPHILS # BLD AUTO: 9.08 10*3/MM3 (ref 1.7–7)
NEUTROPHILS NFR BLD AUTO: 73.2 % (ref 42.7–76)
NEUTROPHILS NFR BLD AUTO: 75 % (ref 42.7–76)
NRBC BLD AUTO-RTO: 0 /100 WBC (ref 0–0.2)
O2 A-A PPRESDIFF RESPIRATORY: 0.2 MMHG
O2 A-A PPRESDIFF RESPIRATORY: 0.2 MMHG
O2 A-A PPRESDIFF RESPIRATORY: 0.3 MMHG
PCO2 BLDA: 35.9 MM HG (ref 35–45)
PCO2 BLDA: 39 MM HG (ref 35–45)
PCO2 BLDA: 43.1 MM HG (ref 35–45)
PCO2 BLDA: 44.9 MM HG (ref 35–45)
PCO2 BLDA: 46.3 MM HG (ref 35–45)
PCO2 BLDA: 49.7 MM HG (ref 35–45)
PCO2 BLDA: 50.7 MM HG (ref 35–45)
PCO2 BLDA: 51.2 MM HG (ref 35–45)
PCO2 BLDA: 54.8 MM HG (ref 35–45)
PCO2 BLDA: 55.2 MM HG (ref 35–45)
PCO2 BLDA: 56.1 MM HG (ref 35–45)
PEEP RESPIRATORY: 7.5 CM[H2O]
PH BLDA: 7.24 PH UNITS (ref 7.35–7.6)
PH BLDA: 7.26 PH UNITS (ref 7.35–7.6)
PH BLDA: 7.27 PH UNITS (ref 7.35–7.6)
PH BLDA: 7.28 PH UNITS (ref 7.35–7.45)
PH BLDA: 7.28 PH UNITS (ref 7.35–7.6)
PH BLDA: 7.28 PH UNITS (ref 7.35–7.6)
PH BLDA: 7.29 PH UNITS (ref 7.35–7.6)
PH BLDA: 7.3 PH UNITS (ref 7.35–7.6)
PH BLDA: 7.37 PH UNITS (ref 7.35–7.45)
PH BLDA: 7.37 PH UNITS (ref 7.35–7.6)
PH BLDA: 7.41 PH UNITS (ref 7.35–7.45)
PHOSPHATE SERPL-MCNC: 3.4 MG/DL (ref 2.5–4.5)
PHOSPHATE SERPL-MCNC: 3.5 MG/DL (ref 2.5–4.5)
PLAT MORPH BLD: NORMAL
PLATELET # BLD AUTO: 217 10*3/MM3 (ref 140–450)
PLATELET # BLD AUTO: 225 10*3/MM3 (ref 140–450)
PLATELET # BLD AUTO: 233 10*3/MM3 (ref 140–450)
PMV BLD AUTO: 10.2 FL (ref 6–12)
PMV BLD AUTO: 10.5 FL (ref 6–12)
PMV BLD AUTO: 10.6 FL (ref 6–12)
PO2 BLDA: 123.8 MM HG (ref 80–100)
PO2 BLDA: 303 MMHG (ref 80–105)
PO2 BLDA: 332 MMHG (ref 80–105)
PO2 BLDA: 398 MMHG (ref 80–105)
PO2 BLDA: 404 MMHG (ref 80–105)
PO2 BLDA: 422 MMHG (ref 80–105)
PO2 BLDA: 425 MMHG (ref 80–105)
PO2 BLDA: 44 MMHG (ref 80–105)
PO2 BLDA: 86.8 MM HG (ref 80–100)
PO2 BLDA: 87 MMHG (ref 80–105)
PO2 BLDA: 87.3 MM HG (ref 80–100)
POTASSIUM BLD-SCNC: 4.3 MMOL/L (ref 3.5–5.2)
POTASSIUM BLD-SCNC: 4.9 MMOL/L (ref 3.5–5.2)
POTASSIUM BLDA-SCNC: 3.9 MMOL/L (ref 3.5–4.9)
POTASSIUM BLDA-SCNC: 4.4 MMOL/L (ref 3.5–4.9)
POTASSIUM BLDA-SCNC: 4.7 MMOL/L (ref 3.5–4.9)
POTASSIUM BLDA-SCNC: 4.9 MMOL/L (ref 3.5–4.9)
POTASSIUM BLDA-SCNC: 5.1 MMOL/L (ref 3.5–4.9)
POTASSIUM BLDA-SCNC: 5.6 MMOL/L (ref 3.5–4.9)
POTASSIUM BLDA-SCNC: 6.1 MMOL/L (ref 3.5–4.9)
POTASSIUM BLDA-SCNC: 6.6 MMOL/L (ref 3.5–4.9)
PROTHROMBIN TIME: 16.5 SECONDS (ref 11.7–14.2)
RBC # BLD AUTO: 3.95 10*6/MM3 (ref 4.14–5.8)
RBC # BLD AUTO: 4.13 10*6/MM3 (ref 4.14–5.8)
RBC # BLD AUTO: 4.3 10*6/MM3 (ref 4.14–5.8)
RBC MORPH BLD: NORMAL
SAO2 % BLDA: 100 % (ref 95–98)
SAO2 % BLDA: 73 % (ref 95–98)
SAO2 % BLDA: 95 % (ref 95–98)
SAO2 % BLDCOA: 96.4 % (ref 92–99)
SAO2 % BLDCOA: 96.7 % (ref 92–99)
SAO2 % BLDCOA: 98.3 % (ref 92–99)
SET MECH RESP RATE: 14
SET MECH RESP RATE: 16
SET MECH RESP RATE: 16
SODIUM BLD-SCNC: 133 MMOL/L (ref 136–145)
SODIUM BLD-SCNC: 142 MMOL/L (ref 136–145)
TOTAL RATE: 14 BREATHS/MINUTE
TOTAL RATE: 16 BREATHS/MINUTE
TOTAL RATE: 16 BREATHS/MINUTE
VENTILATOR MODE: ABNORMAL
VENTILATOR MODE: ABNORMAL
VENTILATOR MODE: AC
VT ON VENT VENT: 700 ML
WBC MORPH BLD: NORMAL
WBC NRBC COR # BLD: 12.4 10*3/MM3 (ref 3.4–10.8)
WBC NRBC COR # BLD: 24.09 10*3/MM3 (ref 3.4–10.8)
WBC NRBC COR # BLD: 34.32 10*3/MM3 (ref 3.4–10.8)

## 2019-08-01 PROCEDURE — P9041 ALBUMIN (HUMAN),5%, 50ML: HCPCS | Performed by: ANESTHESIOLOGY

## 2019-08-01 PROCEDURE — 33508 ENDOSCOPIC VEIN HARVEST: CPT | Performed by: THORACIC SURGERY (CARDIOTHORACIC VASCULAR SURGERY)

## 2019-08-01 PROCEDURE — 86900 BLOOD TYPING SEROLOGIC ABO: CPT

## 2019-08-01 PROCEDURE — 93005 ELECTROCARDIOGRAM TRACING: CPT | Performed by: THORACIC SURGERY (CARDIOTHORACIC VASCULAR SURGERY)

## 2019-08-01 PROCEDURE — 5A1221Z PERFORMANCE OF CARDIAC OUTPUT, CONTINUOUS: ICD-10-PCS | Performed by: THORACIC SURGERY (CARDIOTHORACIC VASCULAR SURGERY)

## 2019-08-01 PROCEDURE — 02100Z9 BYPASS CORONARY ARTERY, ONE ARTERY FROM LEFT INTERNAL MAMMARY, OPEN APPROACH: ICD-10-PCS | Performed by: THORACIC SURGERY (CARDIOTHORACIC VASCULAR SURGERY)

## 2019-08-01 PROCEDURE — 93318 ECHO TRANSESOPHAGEAL INTRAOP: CPT

## 2019-08-01 PROCEDURE — 25010000002 PROPOFOL 10 MG/ML EMULSION: Performed by: ANESTHESIOLOGY

## 2019-08-01 PROCEDURE — 25010000002 MAGNESIUM SULFATE PER 500 MG OF MAGNESIUM: Performed by: ANESTHESIOLOGY

## 2019-08-01 PROCEDURE — 25010000002 FUROSEMIDE PER 20 MG

## 2019-08-01 PROCEDURE — 85347 COAGULATION TIME ACTIVATED: CPT

## 2019-08-01 PROCEDURE — 25010000002 ALBUMIN HUMAN 5% PER 50 ML: Performed by: THORACIC SURGERY (CARDIOTHORACIC VASCULAR SURGERY)

## 2019-08-01 PROCEDURE — 25010000002 AMIODARONE IN DEXTROSE 5% 360-4.14 MG/200ML-% SOLUTION: Performed by: THORACIC SURGERY (CARDIOTHORACIC VASCULAR SURGERY)

## 2019-08-01 PROCEDURE — 94799 UNLISTED PULMONARY SVC/PX: CPT

## 2019-08-01 PROCEDURE — 82803 BLOOD GASES ANY COMBINATION: CPT

## 2019-08-01 PROCEDURE — 85014 HEMATOCRIT: CPT

## 2019-08-01 PROCEDURE — B24BZZ4 ULTRASONOGRAPHY OF HEART WITH AORTA, TRANSESOPHAGEAL: ICD-10-PCS | Performed by: ANESTHESIOLOGY

## 2019-08-01 PROCEDURE — 06BQ4ZZ EXCISION OF LEFT SAPHENOUS VEIN, PERCUTANEOUS ENDOSCOPIC APPROACH: ICD-10-PCS | Performed by: THORACIC SURGERY (CARDIOTHORACIC VASCULAR SURGERY)

## 2019-08-01 PROCEDURE — P9047 ALBUMIN (HUMAN), 25%, 50ML: HCPCS

## 2019-08-01 PROCEDURE — 85025 COMPLETE CBC W/AUTO DIFF WBC: CPT | Performed by: PHYSICIAN ASSISTANT

## 2019-08-01 PROCEDURE — 33521 CABG ARTERY-VEIN FOUR: CPT | Performed by: THORACIC SURGERY (CARDIOTHORACIC VASCULAR SURGERY)

## 2019-08-01 PROCEDURE — 80069 RENAL FUNCTION PANEL: CPT | Performed by: THORACIC SURGERY (CARDIOTHORACIC VASCULAR SURGERY)

## 2019-08-01 PROCEDURE — 71045 X-RAY EXAM CHEST 1 VIEW: CPT

## 2019-08-01 PROCEDURE — 25010000002 ALBUMIN HUMAN 5% PER 50 ML: Performed by: ANESTHESIOLOGY

## 2019-08-01 PROCEDURE — 83735 ASSAY OF MAGNESIUM: CPT | Performed by: THORACIC SURGERY (CARDIOTHORACIC VASCULAR SURGERY)

## 2019-08-01 PROCEDURE — 93010 ELECTROCARDIOGRAM REPORT: CPT | Performed by: INTERNAL MEDICINE

## 2019-08-01 PROCEDURE — 33508 ENDOSCOPIC VEIN HARVEST: CPT | Performed by: SPECIALIST/TECHNOLOGIST, OTHER

## 2019-08-01 PROCEDURE — 94002 VENT MGMT INPAT INIT DAY: CPT

## 2019-08-01 PROCEDURE — 33533 CABG ARTERIAL SINGLE: CPT | Performed by: SPECIALIST/TECHNOLOGIST, OTHER

## 2019-08-01 PROCEDURE — 25010000002 HEPARIN (PORCINE) PER 1000 UNITS

## 2019-08-01 PROCEDURE — 82330 ASSAY OF CALCIUM: CPT | Performed by: THORACIC SURGERY (CARDIOTHORACIC VASCULAR SURGERY)

## 2019-08-01 PROCEDURE — P9041 ALBUMIN (HUMAN),5%, 50ML: HCPCS | Performed by: THORACIC SURGERY (CARDIOTHORACIC VASCULAR SURGERY)

## 2019-08-01 PROCEDURE — 25010000002 ALBUMIN HUMAN 25% PER 50 ML

## 2019-08-01 PROCEDURE — 63710000001 INSULIN REGULAR HUMAN PER 5 UNITS: Performed by: ANESTHESIOLOGY

## 2019-08-01 PROCEDURE — 25010000002 ONDANSETRON PER 1 MG: Performed by: ANESTHESIOLOGY

## 2019-08-01 PROCEDURE — 25010000002 HYDROMORPHONE PER 4 MG: Performed by: ANESTHESIOLOGY

## 2019-08-01 PROCEDURE — 25010000002 METOCLOPRAMIDE PER 10 MG: Performed by: THORACIC SURGERY (CARDIOTHORACIC VASCULAR SURGERY)

## 2019-08-01 PROCEDURE — 85027 COMPLETE CBC AUTOMATED: CPT | Performed by: THORACIC SURGERY (CARDIOTHORACIC VASCULAR SURGERY)

## 2019-08-01 PROCEDURE — 25010000002 HEPARIN (PORCINE) PER 1000 UNITS: Performed by: ANESTHESIOLOGY

## 2019-08-01 PROCEDURE — C1751 CATH, INF, PER/CENT/MIDLINE: HCPCS | Performed by: ANESTHESIOLOGY

## 2019-08-01 PROCEDURE — A4648 IMPLANTABLE TISSUE MARKER: HCPCS | Performed by: THORACIC SURGERY (CARDIOTHORACIC VASCULAR SURGERY)

## 2019-08-01 PROCEDURE — 25010000002 PAPAVERINE PER 60 MG: Performed by: THORACIC SURGERY (CARDIOTHORACIC VASCULAR SURGERY)

## 2019-08-01 PROCEDURE — 86901 BLOOD TYPING SEROLOGIC RH(D): CPT

## 2019-08-01 PROCEDURE — 33521 CABG ARTERY-VEIN FOUR: CPT | Performed by: SPECIALIST/TECHNOLOGIST, OTHER

## 2019-08-01 PROCEDURE — 85007 BL SMEAR W/DIFF WBC COUNT: CPT | Performed by: THORACIC SURGERY (CARDIOTHORACIC VASCULAR SURGERY)

## 2019-08-01 PROCEDURE — 85018 HEMOGLOBIN: CPT

## 2019-08-01 PROCEDURE — 85610 PROTHROMBIN TIME: CPT | Performed by: THORACIC SURGERY (CARDIOTHORACIC VASCULAR SURGERY)

## 2019-08-01 PROCEDURE — 85384 FIBRINOGEN ACTIVITY: CPT | Performed by: THORACIC SURGERY (CARDIOTHORACIC VASCULAR SURGERY)

## 2019-08-01 PROCEDURE — 0213093 BYPASS CORONARY ARTERY, FOUR OR MORE ARTERIES FROM CORONARY ARTERY WITH AUTOLOGOUS VENOUS TISSUE, OPEN APPROACH: ICD-10-PCS | Performed by: THORACIC SURGERY (CARDIOTHORACIC VASCULAR SURGERY)

## 2019-08-01 PROCEDURE — 25010000002 PROTAMINE SULFATE PER 10 MG: Performed by: ANESTHESIOLOGY

## 2019-08-01 PROCEDURE — 25010000002 AMIODARONE IN DEXTROSE 5% 360-4.14 MG/200ML-% SOLUTION: Performed by: ANESTHESIOLOGY

## 2019-08-01 PROCEDURE — 25010000002 FENTANYL CITRATE (PF) 100 MCG/2ML SOLUTION: Performed by: ANESTHESIOLOGY

## 2019-08-01 PROCEDURE — C1729 CATH, DRAINAGE: HCPCS | Performed by: THORACIC SURGERY (CARDIOTHORACIC VASCULAR SURGERY)

## 2019-08-01 PROCEDURE — 25010000002 PIPERACILLIN SOD-TAZOBACTAM PER 1 G: Performed by: THORACIC SURGERY (CARDIOTHORACIC VASCULAR SURGERY)

## 2019-08-01 PROCEDURE — C1713 ANCHOR/SCREW BN/BN,TIS/BN: HCPCS | Performed by: THORACIC SURGERY (CARDIOTHORACIC VASCULAR SURGERY)

## 2019-08-01 PROCEDURE — 85025 COMPLETE CBC W/AUTO DIFF WBC: CPT | Performed by: THORACIC SURGERY (CARDIOTHORACIC VASCULAR SURGERY)

## 2019-08-01 PROCEDURE — 25010000002 EPINEPHRINE PER 0.1 MG: Performed by: ANESTHESIOLOGY

## 2019-08-01 PROCEDURE — 82947 ASSAY GLUCOSE BLOOD QUANT: CPT

## 2019-08-01 PROCEDURE — 25010000002 MIDAZOLAM PER 1 MG: Performed by: ANESTHESIOLOGY

## 2019-08-01 PROCEDURE — 93318 ECHO TRANSESOPHAGEAL INTRAOP: CPT | Performed by: ANESTHESIOLOGY

## 2019-08-01 PROCEDURE — 85730 THROMBOPLASTIN TIME PARTIAL: CPT | Performed by: THORACIC SURGERY (CARDIOTHORACIC VASCULAR SURGERY)

## 2019-08-01 PROCEDURE — 25010000002 PROPOFOL 1000 MG/ML EMULSION: Performed by: THORACIC SURGERY (CARDIOTHORACIC VASCULAR SURGERY)

## 2019-08-01 PROCEDURE — 25010000002 PHENYLEPHRINE PER 1 ML: Performed by: ANESTHESIOLOGY

## 2019-08-01 PROCEDURE — 25010000003 CEFAZOLIN PER 500 MG: Performed by: PHYSICIAN ASSISTANT

## 2019-08-01 PROCEDURE — 33533 CABG ARTERIAL SINGLE: CPT | Performed by: THORACIC SURGERY (CARDIOTHORACIC VASCULAR SURGERY)

## 2019-08-01 PROCEDURE — 25010000002 ONDANSETRON PER 1 MG: Performed by: THORACIC SURGERY (CARDIOTHORACIC VASCULAR SURGERY)

## 2019-08-01 PROCEDURE — 82962 GLUCOSE BLOOD TEST: CPT

## 2019-08-01 PROCEDURE — 99024 POSTOP FOLLOW-UP VISIT: CPT | Performed by: THORACIC SURGERY (CARDIOTHORACIC VASCULAR SURGERY)

## 2019-08-01 PROCEDURE — 25010000002 HEPARIN (PORCINE) PER 1000 UNITS: Performed by: THORACIC SURGERY (CARDIOTHORACIC VASCULAR SURGERY)

## 2019-08-01 DEVICE — SS SUTURE, 4 PER SLEEVE
Type: IMPLANTABLE DEVICE | Site: STERNUM | Status: FUNCTIONAL
Brand: MYO/WIRE II

## 2019-08-01 DEVICE — DISK-SHAPED STYLE, SILICONE (1 PER STERILE PKG)
Type: IMPLANTABLE DEVICE | Site: HEART | Status: FUNCTIONAL
Brand: SCANLAN® RADIOMARK® GRAFT MARKERS

## 2019-08-01 RX ORDER — MEPERIDINE HYDROCHLORIDE 25 MG/ML
25 INJECTION INTRAMUSCULAR; INTRAVENOUS; SUBCUTANEOUS EVERY 4 HOURS PRN
Status: DISCONTINUED | OUTPATIENT
Start: 2019-08-01 | End: 2019-08-02

## 2019-08-01 RX ORDER — MIDAZOLAM HYDROCHLORIDE 1 MG/ML
INJECTION INTRAMUSCULAR; INTRAVENOUS AS NEEDED
Status: DISCONTINUED | OUTPATIENT
Start: 2019-08-01 | End: 2019-08-01 | Stop reason: SURG

## 2019-08-01 RX ORDER — ATORVASTATIN CALCIUM 20 MG/1
40 TABLET, FILM COATED ORAL NIGHTLY
Status: DISCONTINUED | OUTPATIENT
Start: 2019-08-01 | End: 2019-08-05 | Stop reason: HOSPADM

## 2019-08-01 RX ORDER — CHLORHEXIDINE GLUCONATE 0.12 MG/ML
15 RINSE ORAL EVERY 12 HOURS
Status: DISCONTINUED | OUTPATIENT
Start: 2019-08-01 | End: 2019-08-03

## 2019-08-01 RX ORDER — DOPAMINE HYDROCHLORIDE 160 MG/100ML
2-20 INJECTION, SOLUTION INTRAVENOUS CONTINUOUS PRN
Status: DISCONTINUED | OUTPATIENT
Start: 2019-08-01 | End: 2019-08-01

## 2019-08-01 RX ORDER — POTASSIUM CHLORIDE 29.8 MG/ML
20 INJECTION INTRAVENOUS
Status: COMPLETED | OUTPATIENT
Start: 2019-08-01 | End: 2019-08-02

## 2019-08-01 RX ORDER — KETAMINE HYDROCHLORIDE 10 MG/ML
INJECTION INTRAMUSCULAR; INTRAVENOUS AS NEEDED
Status: DISCONTINUED | OUTPATIENT
Start: 2019-08-01 | End: 2019-08-01 | Stop reason: SURG

## 2019-08-01 RX ORDER — HEPARIN SODIUM 5000 [USP'U]/ML
INJECTION, SOLUTION INTRAVENOUS; SUBCUTANEOUS AS NEEDED
Status: DISCONTINUED | OUTPATIENT
Start: 2019-08-01 | End: 2019-08-01 | Stop reason: HOSPADM

## 2019-08-01 RX ORDER — ACETAMINOPHEN 325 MG/1
650 TABLET ORAL EVERY 4 HOURS
Status: DISPENSED | OUTPATIENT
Start: 2019-08-01 | End: 2019-08-02

## 2019-08-01 RX ORDER — BISACODYL 5 MG/1
10 TABLET, DELAYED RELEASE ORAL DAILY PRN
Status: DISCONTINUED | OUTPATIENT
Start: 2019-08-01 | End: 2019-08-05 | Stop reason: HOSPADM

## 2019-08-01 RX ORDER — ACETAMINOPHEN 650 MG/1
650 SUPPOSITORY RECTAL EVERY 4 HOURS
Status: ACTIVE | OUTPATIENT
Start: 2019-08-01 | End: 2019-08-02

## 2019-08-01 RX ORDER — FAMOTIDINE 10 MG/ML
20 INJECTION, SOLUTION INTRAVENOUS ONCE
Status: DISCONTINUED | OUTPATIENT
Start: 2019-08-01 | End: 2019-08-01 | Stop reason: HOSPADM

## 2019-08-01 RX ORDER — ROCURONIUM BROMIDE 10 MG/ML
INJECTION, SOLUTION INTRAVENOUS AS NEEDED
Status: DISCONTINUED | OUTPATIENT
Start: 2019-08-01 | End: 2019-08-01 | Stop reason: SURG

## 2019-08-01 RX ORDER — BISACODYL 10 MG
10 SUPPOSITORY, RECTAL RECTAL DAILY PRN
Status: DISCONTINUED | OUTPATIENT
Start: 2019-08-02 | End: 2019-08-05 | Stop reason: HOSPADM

## 2019-08-01 RX ORDER — SUFENTANIL CITRATE 50 UG/ML
INJECTION EPIDURAL; INTRAVENOUS AS NEEDED
Status: DISCONTINUED | OUTPATIENT
Start: 2019-08-01 | End: 2019-08-01 | Stop reason: SURG

## 2019-08-01 RX ORDER — PANTOPRAZOLE SODIUM 40 MG/1
40 TABLET, DELAYED RELEASE ORAL
Status: DISCONTINUED | OUTPATIENT
Start: 2019-08-02 | End: 2019-08-05 | Stop reason: HOSPADM

## 2019-08-01 RX ORDER — POTASSIUM CHLORIDE 7.45 MG/ML
10 INJECTION INTRAVENOUS
Status: DISCONTINUED | OUTPATIENT
Start: 2019-08-01 | End: 2019-08-05 | Stop reason: HOSPADM

## 2019-08-01 RX ORDER — CEFAZOLIN SODIUM IN 0.9 % NACL 3 G/100 ML
3 INTRAVENOUS SOLUTION, PIGGYBACK (ML) INTRAVENOUS EVERY 8 HOURS
Status: DISCONTINUED | OUTPATIENT
Start: 2019-08-01 | End: 2019-08-01

## 2019-08-01 RX ORDER — SODIUM CHLORIDE 9 MG/ML
30 INJECTION, SOLUTION INTRAVENOUS CONTINUOUS PRN
Status: DISCONTINUED | OUTPATIENT
Start: 2019-08-01 | End: 2019-08-02

## 2019-08-01 RX ORDER — MIDAZOLAM HYDROCHLORIDE 1 MG/ML
2 INJECTION INTRAMUSCULAR; INTRAVENOUS
Status: DISCONTINUED | OUTPATIENT
Start: 2019-08-01 | End: 2019-08-02

## 2019-08-01 RX ORDER — PROTAMINE SULFATE 10 MG/ML
INJECTION, SOLUTION INTRAVENOUS AS NEEDED
Status: DISCONTINUED | OUTPATIENT
Start: 2019-08-01 | End: 2019-08-01 | Stop reason: SURG

## 2019-08-01 RX ORDER — MAGNESIUM SULFATE 1 G/100ML
1 INJECTION INTRAVENOUS EVERY 8 HOURS
Status: ACTIVE | OUTPATIENT
Start: 2019-08-01 | End: 2019-08-02

## 2019-08-01 RX ORDER — CYCLOBENZAPRINE HCL 10 MG
10 TABLET ORAL EVERY 8 HOURS PRN
Status: DISCONTINUED | OUTPATIENT
Start: 2019-08-02 | End: 2019-08-01

## 2019-08-01 RX ORDER — ACETAMINOPHEN 160 MG/5ML
650 SOLUTION ORAL EVERY 4 HOURS
Status: DISPENSED | OUTPATIENT
Start: 2019-08-01 | End: 2019-08-02

## 2019-08-01 RX ORDER — FUROSEMIDE 10 MG/ML
40 INJECTION INTRAMUSCULAR; INTRAVENOUS EVERY 6 HOURS PRN
Status: COMPLETED | OUTPATIENT
Start: 2019-08-01 | End: 2019-08-02

## 2019-08-01 RX ORDER — MORPHINE SULFATE 2 MG/ML
4 INJECTION, SOLUTION INTRAMUSCULAR; INTRAVENOUS
Status: DISCONTINUED | OUTPATIENT
Start: 2019-08-01 | End: 2019-08-02

## 2019-08-01 RX ORDER — ONDANSETRON 2 MG/ML
4 INJECTION INTRAMUSCULAR; INTRAVENOUS EVERY 6 HOURS PRN
Status: DISCONTINUED | OUTPATIENT
Start: 2019-08-01 | End: 2019-08-05 | Stop reason: HOSPADM

## 2019-08-01 RX ORDER — ALPRAZOLAM 0.25 MG/1
0.25 TABLET ORAL EVERY 8 HOURS PRN
Status: DISCONTINUED | OUTPATIENT
Start: 2019-08-01 | End: 2019-08-05 | Stop reason: HOSPADM

## 2019-08-01 RX ORDER — SENNA AND DOCUSATE SODIUM 50; 8.6 MG/1; MG/1
2 TABLET, FILM COATED ORAL NIGHTLY
Status: DISCONTINUED | OUTPATIENT
Start: 2019-08-02 | End: 2019-08-05 | Stop reason: HOSPADM

## 2019-08-01 RX ORDER — SODIUM CHLORIDE 9 MG/ML
INJECTION, SOLUTION INTRAVENOUS CONTINUOUS PRN
Status: DISCONTINUED | OUTPATIENT
Start: 2019-08-01 | End: 2019-08-01 | Stop reason: SURG

## 2019-08-01 RX ORDER — NALOXONE HCL 0.4 MG/ML
0.4 VIAL (ML) INJECTION
Status: DISCONTINUED | OUTPATIENT
Start: 2019-08-01 | End: 2019-08-05 | Stop reason: HOSPADM

## 2019-08-01 RX ORDER — POTASSIUM CHLORIDE 1.5 G/1.77G
40 POWDER, FOR SOLUTION ORAL AS NEEDED
Status: DISCONTINUED | OUTPATIENT
Start: 2019-08-01 | End: 2019-08-05 | Stop reason: HOSPADM

## 2019-08-01 RX ORDER — NITROGLYCERIN 20 MG/100ML
INJECTION INTRAVENOUS CONTINUOUS PRN
Status: DISCONTINUED | OUTPATIENT
Start: 2019-08-01 | End: 2019-08-01 | Stop reason: SURG

## 2019-08-01 RX ORDER — NOREPINEPHRINE BITARTRATE 1 MG/ML
INJECTION, SOLUTION INTRAVENOUS CONTINUOUS PRN
Status: DISCONTINUED | OUTPATIENT
Start: 2019-08-01 | End: 2019-08-01 | Stop reason: SURG

## 2019-08-01 RX ORDER — POTASSIUM CHLORIDE 750 MG/1
40 CAPSULE, EXTENDED RELEASE ORAL AS NEEDED
Status: DISCONTINUED | OUTPATIENT
Start: 2019-08-01 | End: 2019-08-05 | Stop reason: HOSPADM

## 2019-08-01 RX ORDER — LIDOCAINE HYDROCHLORIDE 20 MG/ML
INJECTION, SOLUTION INFILTRATION; PERINEURAL AS NEEDED
Status: DISCONTINUED | OUTPATIENT
Start: 2019-08-01 | End: 2019-08-01 | Stop reason: SURG

## 2019-08-01 RX ORDER — SODIUM CHLORIDE 9 MG/ML
30 INJECTION, SOLUTION INTRAVENOUS CONTINUOUS
Status: DISCONTINUED | OUTPATIENT
Start: 2019-08-01 | End: 2019-08-02

## 2019-08-01 RX ORDER — ACETAMINOPHEN 650 MG/1
650 SUPPOSITORY RECTAL EVERY 4 HOURS PRN
Status: DISCONTINUED | OUTPATIENT
Start: 2019-08-02 | End: 2019-08-05 | Stop reason: HOSPADM

## 2019-08-01 RX ORDER — MAGNESIUM SULFATE HEPTAHYDRATE 500 MG/ML
INJECTION, SOLUTION INTRAMUSCULAR; INTRAVENOUS AS NEEDED
Status: DISCONTINUED | OUTPATIENT
Start: 2019-08-01 | End: 2019-08-01 | Stop reason: SURG

## 2019-08-01 RX ORDER — ALBUMIN, HUMAN INJ 5% 5 %
1500 SOLUTION INTRAVENOUS AS NEEDED
Status: DISPENSED | OUTPATIENT
Start: 2019-08-01 | End: 2019-08-02

## 2019-08-01 RX ORDER — HYDROCODONE BITARTRATE AND ACETAMINOPHEN 5; 325 MG/1; MG/1
2 TABLET ORAL EVERY 4 HOURS PRN
Status: DISCONTINUED | OUTPATIENT
Start: 2019-08-01 | End: 2019-08-05 | Stop reason: HOSPADM

## 2019-08-01 RX ORDER — FENTANYL CITRATE 50 UG/ML
INJECTION, SOLUTION INTRAMUSCULAR; INTRAVENOUS AS NEEDED
Status: DISCONTINUED | OUTPATIENT
Start: 2019-08-01 | End: 2019-08-01 | Stop reason: SURG

## 2019-08-01 RX ORDER — PROPOFOL 10 MG/ML
VIAL (ML) INTRAVENOUS AS NEEDED
Status: DISCONTINUED | OUTPATIENT
Start: 2019-08-01 | End: 2019-08-01 | Stop reason: SURG

## 2019-08-01 RX ORDER — POTASSIUM CHLORIDE 29.8 MG/ML
20 INJECTION INTRAVENOUS
Status: DISCONTINUED | OUTPATIENT
Start: 2019-08-01 | End: 2019-08-05 | Stop reason: HOSPADM

## 2019-08-01 RX ORDER — HEPARIN SODIUM 1000 [USP'U]/ML
INJECTION, SOLUTION INTRAVENOUS; SUBCUTANEOUS AS NEEDED
Status: DISCONTINUED | OUTPATIENT
Start: 2019-08-01 | End: 2019-08-01 | Stop reason: SURG

## 2019-08-01 RX ORDER — NITROGLYCERIN 20 MG/100ML
5-200 INJECTION INTRAVENOUS
Status: DISCONTINUED | OUTPATIENT
Start: 2019-08-01 | End: 2019-08-02

## 2019-08-01 RX ORDER — AMINOCAPROIC ACID 250 MG/ML
INJECTION, SOLUTION INTRAVENOUS AS NEEDED
Status: DISCONTINUED | OUTPATIENT
Start: 2019-08-01 | End: 2019-08-01 | Stop reason: SURG

## 2019-08-01 RX ORDER — MILRINONE LACTATE 0.2 MG/ML
.25-.75 INJECTION, SOLUTION INTRAVENOUS CONTINUOUS PRN
Status: DISCONTINUED | OUTPATIENT
Start: 2019-08-01 | End: 2019-08-01

## 2019-08-01 RX ORDER — DEXMEDETOMIDINE HYDROCHLORIDE 4 UG/ML
.2-1.5 INJECTION, SOLUTION INTRAVENOUS
Status: DISCONTINUED | OUTPATIENT
Start: 2019-08-01 | End: 2019-08-02

## 2019-08-01 RX ORDER — ALBUMIN, HUMAN INJ 5% 5 %
SOLUTION INTRAVENOUS CONTINUOUS PRN
Status: DISCONTINUED | OUTPATIENT
Start: 2019-08-01 | End: 2019-08-01 | Stop reason: SURG

## 2019-08-01 RX ORDER — METOCLOPRAMIDE HYDROCHLORIDE 5 MG/ML
10 INJECTION INTRAMUSCULAR; INTRAVENOUS EVERY 6 HOURS
Status: DISPENSED | OUTPATIENT
Start: 2019-08-01 | End: 2019-08-02

## 2019-08-01 RX ORDER — MORPHINE SULFATE 2 MG/ML
1 INJECTION, SOLUTION INTRAMUSCULAR; INTRAVENOUS EVERY 4 HOURS PRN
Status: DISCONTINUED | OUTPATIENT
Start: 2019-08-01 | End: 2019-08-05

## 2019-08-01 RX ORDER — OXYCODONE HYDROCHLORIDE 5 MG/1
10 TABLET ORAL EVERY 4 HOURS PRN
Status: DISCONTINUED | OUTPATIENT
Start: 2019-08-01 | End: 2019-08-05

## 2019-08-01 RX ORDER — ASPIRIN 325 MG
325 TABLET, DELAYED RELEASE (ENTERIC COATED) ORAL DAILY
Status: DISCONTINUED | OUTPATIENT
Start: 2019-08-02 | End: 2019-08-05 | Stop reason: HOSPADM

## 2019-08-01 RX ORDER — HYDROMORPHONE HCL 110MG/55ML
PATIENT CONTROLLED ANALGESIA SYRINGE INTRAVENOUS AS NEEDED
Status: DISCONTINUED | OUTPATIENT
Start: 2019-08-01 | End: 2019-08-01 | Stop reason: SURG

## 2019-08-01 RX ORDER — ACETAMINOPHEN 160 MG/5ML
650 SOLUTION ORAL EVERY 4 HOURS PRN
Status: DISCONTINUED | OUTPATIENT
Start: 2019-08-02 | End: 2019-08-05 | Stop reason: HOSPADM

## 2019-08-01 RX ORDER — CALCIUM CHLORIDE 100 MG/ML
INJECTION INTRAVENOUS; INTRAVENTRICULAR AS NEEDED
Status: DISCONTINUED | OUTPATIENT
Start: 2019-08-01 | End: 2019-08-01 | Stop reason: SURG

## 2019-08-01 RX ORDER — ACETAMINOPHEN 325 MG/1
650 TABLET ORAL EVERY 4 HOURS PRN
Status: DISCONTINUED | OUTPATIENT
Start: 2019-08-02 | End: 2019-08-05 | Stop reason: HOSPADM

## 2019-08-01 RX ORDER — ONDANSETRON 2 MG/ML
INJECTION INTRAMUSCULAR; INTRAVENOUS AS NEEDED
Status: DISCONTINUED | OUTPATIENT
Start: 2019-08-01 | End: 2019-08-01 | Stop reason: SURG

## 2019-08-01 RX ORDER — SODIUM CHLORIDE 0.9 % (FLUSH) 0.9 %
30 SYRINGE (ML) INJECTION ONCE AS NEEDED
Status: DISCONTINUED | OUTPATIENT
Start: 2019-08-01 | End: 2019-08-02

## 2019-08-01 RX ORDER — PAPAVERINE HYDROCHLORIDE 30 MG/ML
INJECTION INTRAMUSCULAR; INTRAVENOUS AS NEEDED
Status: DISCONTINUED | OUTPATIENT
Start: 2019-08-01 | End: 2019-08-01 | Stop reason: HOSPADM

## 2019-08-01 RX ORDER — NOREPINEPHRINE BIT/0.9 % NACL 8 MG/250ML
.02-.3 INFUSION BOTTLE (ML) INTRAVENOUS CONTINUOUS PRN
Status: DISCONTINUED | OUTPATIENT
Start: 2019-08-01 | End: 2019-08-02

## 2019-08-01 RX ORDER — PROPOFOL 10 MG/ML
VIAL (ML) INTRAVENOUS CONTINUOUS PRN
Status: DISCONTINUED | OUTPATIENT
Start: 2019-08-01 | End: 2019-08-01 | Stop reason: SURG

## 2019-08-01 RX ADMIN — HYDROMORPHONE HYDROCHLORIDE 1 MG: 2 INJECTION INTRAMUSCULAR; INTRAVENOUS; SUBCUTANEOUS at 11:41

## 2019-08-01 RX ADMIN — MUPIROCIN 1 APPLICATION: 20 OINTMENT TOPICAL at 20:10

## 2019-08-01 RX ADMIN — CHLORHEXIDINE GLUCONATE 15 ML: 1.2 RINSE ORAL at 19:14

## 2019-08-01 RX ADMIN — Medication 3 MG: at 11:04

## 2019-08-01 RX ADMIN — ONDANSETRON HYDROCHLORIDE 4 MG: 2 SOLUTION INTRAMUSCULAR; INTRAVENOUS at 21:35

## 2019-08-01 RX ADMIN — PROPOFOL 20 MCG/KG/MIN: 10 INJECTION, EMULSION INTRAVENOUS at 19:45

## 2019-08-01 RX ADMIN — METOPROLOL TARTRATE 12.5 MG: 25 TABLET ORAL at 06:31

## 2019-08-01 RX ADMIN — SODIUM CHLORIDE 0.8 MCG/KG/HR: 9 INJECTION, SOLUTION INTRAVENOUS at 18:52

## 2019-08-01 RX ADMIN — EPINEPHRINE 0.03 MCG/KG/MIN: 1 INJECTION, SOLUTION, CONCENTRATE INTRAVENOUS at 11:12

## 2019-08-01 RX ADMIN — PROPOFOL 50 MCG/KG/MIN: 10 INJECTION, EMULSION INTRAVENOUS at 09:10

## 2019-08-01 RX ADMIN — SODIUM CHLORIDE 2 UNITS/HR: 900 INJECTION, SOLUTION INTRAVENOUS at 08:04

## 2019-08-01 RX ADMIN — PHENYLEPHRINE HYDROCHLORIDE 200 MCG: 10 INJECTION INTRAVENOUS at 07:18

## 2019-08-01 RX ADMIN — SODIUM CHLORIDE 30 ML/HR: 9 INJECTION, SOLUTION INTRAVENOUS at 13:00

## 2019-08-01 RX ADMIN — SODIUM CHLORIDE 1 MCG/KG/HR: 900 INJECTION, SOLUTION INTRAVENOUS at 06:54

## 2019-08-01 RX ADMIN — SODIUM CHLORIDE 30 ML/HR: 9 INJECTION, SOLUTION INTRAVENOUS at 13:35

## 2019-08-01 RX ADMIN — FENTANYL CITRATE 50 MCG: 50 INJECTION, SOLUTION INTRAMUSCULAR; INTRAVENOUS at 06:49

## 2019-08-01 RX ADMIN — ROCURONIUM BROMIDE 50 MG: 10 INJECTION INTRAVENOUS at 08:08

## 2019-08-01 RX ADMIN — METOCLOPRAMIDE 10 MG: 5 INJECTION, SOLUTION INTRAMUSCULAR; INTRAVENOUS at 18:48

## 2019-08-01 RX ADMIN — ONDANSETRON 4 MG: 2 INJECTION INTRAMUSCULAR; INTRAVENOUS at 11:29

## 2019-08-01 RX ADMIN — ALBUMIN HUMAN 250 ML: 0.05 INJECTION, SOLUTION INTRAVENOUS at 19:26

## 2019-08-01 RX ADMIN — AMINOCAPROIC ACID 10 G: 250 INJECTION, SOLUTION INTRAVENOUS at 07:32

## 2019-08-01 RX ADMIN — NOREPINEPHRINE BITARTRATE 0.02 MCG/KG/MIN: 1 INJECTION, SOLUTION, CONCENTRATE INTRAVENOUS at 11:12

## 2019-08-01 RX ADMIN — AMINOCAPROIC ACID 10 G: 250 INJECTION, SOLUTION INTRAVENOUS at 11:42

## 2019-08-01 RX ADMIN — PROTAMINE SULFATE 300 MG: 10 INJECTION, SOLUTION INTRAVENOUS at 11:16

## 2019-08-01 RX ADMIN — SODIUM BICARBONATE 50 MEQ: 84 INJECTION, SOLUTION INTRAVENOUS at 13:38

## 2019-08-01 RX ADMIN — ALBUMIN HUMAN 250 ML: 0.05 INJECTION, SOLUTION INTRAVENOUS at 17:29

## 2019-08-01 RX ADMIN — PROPOFOL 20 MCG/KG/MIN: 10 INJECTION, EMULSION INTRAVENOUS at 15:06

## 2019-08-01 RX ADMIN — ROCURONIUM BROMIDE 50 MG: 10 INJECTION INTRAVENOUS at 07:11

## 2019-08-01 RX ADMIN — SUFENTANIL CITRATE 20 MCG: 50 INJECTION, SOLUTION EPIDURAL; INTRAVENOUS at 11:55

## 2019-08-01 RX ADMIN — SODIUM CHLORIDE 1 MCG/KG/HR: 9 INJECTION, SOLUTION INTRAVENOUS at 12:43

## 2019-08-01 RX ADMIN — FENTANYL CITRATE 150 MCG: 50 INJECTION, SOLUTION INTRAMUSCULAR; INTRAVENOUS at 11:19

## 2019-08-01 RX ADMIN — TAZOBACTAM SODIUM AND PIPERACILLIN SODIUM 4.5 G: 500; 4 INJECTION, SOLUTION INTRAVENOUS at 19:31

## 2019-08-01 RX ADMIN — HYDROMORPHONE HYDROCHLORIDE 1 MG: 2 INJECTION INTRAMUSCULAR; INTRAVENOUS; SUBCUTANEOUS at 08:05

## 2019-08-01 RX ADMIN — SODIUM CHLORIDE: 9 INJECTION, SOLUTION INTRAVENOUS at 11:19

## 2019-08-01 RX ADMIN — PHENYLEPHRINE HYDROCHLORIDE 0.2 MCG/KG/MIN: 10 INJECTION, SOLUTION INTRAMUSCULAR; INTRAVENOUS; SUBCUTANEOUS at 07:08

## 2019-08-01 RX ADMIN — ALBUMIN (HUMAN): 0.05 SOLUTION INTRAVENOUS at 11:29

## 2019-08-01 RX ADMIN — SODIUM CHLORIDE 1 MCG/KG/HR: 9 INJECTION, SOLUTION INTRAVENOUS at 22:49

## 2019-08-01 RX ADMIN — PHENYLEPHRINE HYDROCHLORIDE 100 MCG: 10 INJECTION INTRAVENOUS at 07:11

## 2019-08-01 RX ADMIN — CEFAZOLIN 3 G: 1 INJECTION, POWDER, FOR SOLUTION INTRAMUSCULAR; INTRAVENOUS; PARENTERAL at 07:35

## 2019-08-01 RX ADMIN — ACETAMINOPHEN 650 MG: 325 TABLET, FILM COATED ORAL at 20:56

## 2019-08-01 RX ADMIN — SODIUM BICARBONATE 50 MEQ: 84 INJECTION, SOLUTION INTRAVENOUS at 13:47

## 2019-08-01 RX ADMIN — Medication 2 MG: at 06:49

## 2019-08-01 RX ADMIN — SUFENTANIL CITRATE 10 MCG: 50 INJECTION, SOLUTION EPIDURAL; INTRAVENOUS at 11:53

## 2019-08-01 RX ADMIN — SUFENTANIL CITRATE 25 MCG: 50 INJECTION, SOLUTION EPIDURAL; INTRAVENOUS at 12:00

## 2019-08-01 RX ADMIN — MAGNESIUM SULFATE HEPTAHYDRATE 2 G: 500 INJECTION, SOLUTION INTRAMUSCULAR; INTRAVENOUS at 11:23

## 2019-08-01 RX ADMIN — SODIUM CHLORIDE: 9 INJECTION, SOLUTION INTRAVENOUS at 06:48

## 2019-08-01 RX ADMIN — AMIODARONE HYDROCHLORIDE 0.5 MG/MIN: 1.8 INJECTION, SOLUTION INTRAVENOUS at 21:40

## 2019-08-01 RX ADMIN — CALCIUM CHLORIDE 0.5 G: 100 INJECTION, SOLUTION INTRAVENOUS; INTRAVENTRICULAR at 11:12

## 2019-08-01 RX ADMIN — ATORVASTATIN CALCIUM 40 MG: 20 TABLET, FILM COATED ORAL at 20:56

## 2019-08-01 RX ADMIN — KETAMINE HYDROCHLORIDE 25 MG: 10 INJECTION INTRAMUSCULAR; INTRAVENOUS at 08:00

## 2019-08-01 RX ADMIN — FENTANYL CITRATE 50 MCG: 50 INJECTION, SOLUTION INTRAMUSCULAR; INTRAVENOUS at 06:54

## 2019-08-01 RX ADMIN — AMIODARONE HYDROCHLORIDE 0.5 MG/MIN: 1.8 INJECTION, SOLUTION INTRAVENOUS at 11:27

## 2019-08-01 RX ADMIN — SODIUM CHLORIDE: 9 INJECTION, SOLUTION INTRAVENOUS at 07:32

## 2019-08-01 RX ADMIN — PROPOFOL 50 MG: 10 INJECTION, EMULSION INTRAVENOUS at 08:08

## 2019-08-01 RX ADMIN — CEFAZOLIN 3 G: 1 INJECTION, POWDER, FOR SOLUTION INTRAMUSCULAR; INTRAVENOUS; PARENTERAL at 11:22

## 2019-08-01 RX ADMIN — OXYCODONE HYDROCHLORIDE 10 MG: 5 TABLET ORAL at 20:56

## 2019-08-01 RX ADMIN — KETAMINE HYDROCHLORIDE 25 MG: 10 INJECTION INTRAMUSCULAR; INTRAVENOUS at 07:12

## 2019-08-01 RX ADMIN — ALBUMIN HUMAN 250 ML: 0.05 INJECTION, SOLUTION INTRAVENOUS at 16:54

## 2019-08-01 RX ADMIN — SUFENTANIL CITRATE 25 MCG: 50 INJECTION, SOLUTION EPIDURAL; INTRAVENOUS at 12:07

## 2019-08-01 RX ADMIN — HEPARIN SODIUM 5000 UNITS: 1000 INJECTION, SOLUTION INTRAVENOUS; SUBCUTANEOUS at 08:57

## 2019-08-01 RX ADMIN — PROPOFOL 200 MG: 10 INJECTION, EMULSION INTRAVENOUS at 07:11

## 2019-08-01 RX ADMIN — LIDOCAINE HYDROCHLORIDE 100 MG: 20 INJECTION, SOLUTION INFILTRATION; PERINEURAL at 07:11

## 2019-08-01 RX ADMIN — HEPARIN SODIUM 30000 UNITS: 1000 INJECTION, SOLUTION INTRAVENOUS; SUBCUTANEOUS at 08:46

## 2019-08-01 RX ADMIN — SODIUM CHLORIDE 7.2 UNITS/HR: 9 INJECTION, SOLUTION INTRAVENOUS at 23:08

## 2019-08-01 RX ADMIN — ALBUMIN HUMAN 250 ML: 0.05 INJECTION, SOLUTION INTRAVENOUS at 23:21

## 2019-08-01 RX ADMIN — SUFENTANIL CITRATE 20 MCG: 50 INJECTION, SOLUTION EPIDURAL; INTRAVENOUS at 11:57

## 2019-08-01 RX ADMIN — NITROGLYCERIN 0.25 MCG/KG/MIN: 20 INJECTION INTRAVENOUS at 06:49

## 2019-08-01 NOTE — ANESTHESIA PROCEDURE NOTES
Right IJ PA Cath      Patient reassessed immediately prior to procedure    Patient location during procedure: OR  Start time: 8/1/2019 7:29 AM  Stop Time:8/1/2019 7:32 AM  Indications: central pressure monitoring and MD/Surgeon request  Staff  Anesthesiologist: Bishop Mcfarland MD  Preanesthetic Checklist  Completed: patient identified, surgical consent, pre-op evaluation, timeout performed, IV checked, risks and benefits discussed and monitors and equipment checked  Central Line Prep  Sterile Tech:cap, gloves, gown, mask and sterile barriers  Prep: chloraprep  Patient monitoring: blood pressure monitoring, continuous pulse oximetry and EKG  Central Line Procedure  Laterality:right  Location:internal jugular  Catheter Type:[place]  Guidance:Waveform MonitoringImages: still images not obtained  Assessment  Assessement:blood return through all ports and free fluid flow  Complications:no  Patient Tolerance:patient tolerated the procedure well with no apparent complications

## 2019-08-01 NOTE — PAYOR COMM NOTE
"UR CONTACT:  SHERRIE  P: [phone]        F: [phone]  REF #[id number]    MOVED TO CVR AFTER SURGERY        Marco Chase (54 y.o. Male)     Date of Birth Social Security Number Address Home Phone MRN    1965  [address] [facility] [facility] [phone] [id number]    Catholic Marital Status          None        Admission Date Admission Type Admitting Provider Attending Provider Department, Room/Bed    7/30/19 Urgent Christiano Zapine MD Khan, Ahmad Aftab, MD [facility] CARDIO RECOVERY, 2001/1    Discharge Date Discharge Disposition Discharge Destination                       Attending Provider:  Christiano Zapien MD    Allergies:  Bee Venom    Isolation:  None   Infection:  None   Code Status:  CPR    Ht:  182.9 cm (72\")   Wt:  124 kg (272 lb 4.3 oz)    Admission Cmt:  None   Principal Problem:  Coronary artery disease involving native coronary artery of native heart with unstable angina pectoris (CMS/[facility]) [I25.110] More...                 Active Insurance as of 7/30/2019     Primary Coverage     Payor Plan Insurance Group Employer/Plan Group    ANTHEM BLUE CROSS ANTH [employer] CROSS BLUE SHIELD PPO [id number]     Payor Plan Address Payor Plan Phone Number Payor Plan Fax Number Effective Dates    [address] [phone]  1/1/2017 - None Entered    [place] [address]       Subscriber Name Subscriber Birth Date Member ID       MARCO CHASE 1965 [id number]                 Emergency Contacts      (Rel.) Home Phone Work Phone Mobile Phone    Priscilla Chase (Spouse) [phone] -- --            ICU Vital Signs     Row Name 08/01/19 1215 08/01/19 0617 08/01/19 0604 08/01/19 0600 08/01/19 0540       Height and Weight    Height  182.9 cm (72\")  --  --  --  --    Ideal Body Weight (IBW) (kg)  82.07  --  --  --  --    Weight in (lb) to have BMI = 25  183.9  --  --  --  --       Vitals    Pulse  89  89  --  -- off monitor, being shaved  0  " "(Abnormal)     Resp  14  --  --  --  --    Resp Rate (Observed) Vent  18  --  --  --  --    BP  --  --  162/97  --  --    Noninvasive MAP (mmHg)  --  --  114  --  --       Oxygen Therapy    SpO2  95 %  94 %  --  93 %  --    Device (Oxygen Therapy)  ventilator  --  --  --  --        Lines, Drains & Airways    Active LDAs     Name:   Placement date:   Placement time:   Site:   Days:    Pulmonary Artery Catheter - Triple Lumen 08/01/19 Right Internal jugular   08/01/19    0729 created via procedure documentation     less than 1    CVC Double Lumen 08/01/19 Right Internal jugular   08/01/19    0712 created via procedure documentation    Internal jugular   less than 1    Peripheral IV 07/30/19  Left Antecubital   07/30/19    -- present on admission    Antecubital   2    Peripheral IV 07/30/19  Anterior;Distal;Left Forearm   07/30/19    -- present on admission    Forearm   2    Urethral Catheter Temperature probe 16 Fr.   08/01/19    0715 F/C INSERTED WITHOUT DIFFICULTY. CLEAR YELLOW URINE NOTED. FORESKIN REPLACED BACK INTO FORWARD POSITION WITHOUT DIFFICULTY.     less than 1    Y Chest Tube 1 and 2 1 Left Pleural 28 Fr. 2 Mediastinal 28 Fr.   08/01/19    1100     less than 1    ETT    08/01/19    0755 created via procedure documentation     less than 1    Arterial Line 08/01/19 Left Radial   08/01/19    0658 created via procedure documentation    Radial   less than 1                Hospital Medications (active)       Dose Frequency Start End    acetaminophen (TYLENOL) 160 MG/5ML solution 650 mg 650 mg Every 4 Hours 8/1/2019 8/2/2019    Sig - Route: Take 20.3 mL by mouth Every 4 (Four) Hours. - Oral    Linked Group 1:  \"Or\" Linked Group Details        acetaminophen (TYLENOL) 160 MG/5ML solution 650 mg 650 mg Every 4 Hours PRN 8/2/2019     Sig - Route: Take 20.3 mL by mouth Every 4 (Four) Hours As Needed for Mild Pain . - Oral    Linked Group 2:  \"Or\" Linked Group Details        acetaminophen (TYLENOL) suppository 650 mg " "650 mg Every 4 Hours 8/1/2019 8/2/2019    Sig - Route: Insert 1 suppository into the rectum Every 4 (Four) Hours. - Rectal    Linked Group 1:  \"Or\" Linked Group Details        acetaminophen (TYLENOL) suppository 650 mg 650 mg Every 4 Hours PRN 8/2/2019     Sig - Route: Insert 1 suppository into the rectum Every 4 (Four) Hours As Needed for Mild Pain . - Rectal    Linked Group 2:  \"Or\" Linked Group Details        acetaminophen (TYLENOL) tablet 650 mg 650 mg Every 4 Hours 8/1/2019 8/2/2019    Sig - Route: Take 2 tablets by mouth Every 4 (Four) Hours. - Oral    Linked Group 1:  \"Or\" Linked Group Details        acetaminophen (TYLENOL) tablet 650 mg 650 mg Every 4 Hours PRN 8/2/2019     Sig - Route: Take 2 tablets by mouth Every 4 (Four) Hours As Needed for Mild Pain . - Oral    Linked Group 2:  \"Or\" Linked Group Details        albumin human 5 % solution 1,500 mL 1,500 mL As Needed 8/1/2019 8/2/2019    Sig - Route: Infuse 1,500 mL into a venous catheter As Needed (CVR Protocol). - Intravenous    ALPRAZolam (XANAX) tablet 0.25 mg 0.25 mg Every 8 Hours PRN 8/1/2019 8/11/2019    Sig - Route: Take 1 tablet by mouth Every 8 (Eight) Hours As Needed for Anxiety. - Oral    aspirin EC tablet 325 mg 325 mg Daily 8/2/2019     Sig - Route: Take 1 tablet by mouth Daily. - Oral    atorvastatin (LIPITOR) tablet 40 mg 40 mg Nightly 8/1/2019     Sig - Route: Take 2 tablets by mouth Every Night. - Oral    bisacodyl (DULCOLAX) EC tablet 10 mg 10 mg Daily PRN 8/1/2019     Sig - Route: Take 2 tablets by mouth Daily As Needed for Constipation. - Oral    bisacodyl (DULCOLAX) suppository 10 mg 10 mg Daily PRN 8/2/2019     Sig - Route: Insert 1 suppository into the rectum Daily As Needed for Constipation. - Rectal    ceFAZolin (ANCEF) 3 g in sodium chloride 0.9 % 100 mL IVPB (MAR Hold) ((MAR Hold) since 8/1/2019  6:54 AM) 3 g Once 8/1/2019 8/1/2019    Sig - Route: Infuse 3 g into a venous catheter 1 (One) Time. - Intravenous    Cosign for " Ordering: Accepted by Christiano Zapien MD on 7/31/2019  1:40 PM    ceFAZolin in Sodium Chloride (ANCEF) IVPB solution 3 g 3 g Every 8 Hours 8/1/2019 8/3/2019    Sig - Route: Infuse 100 mL into a venous catheter Every 8 (Eight) Hours. - Intravenous    chlorhexidine (PERIDEX) 0.12 % solution 15 mL 15 mL Every 12 Hours Scheduled 7/30/2019 7/31/2019    Sig - Route: Apply 15 mL to the mouth or throat Every 12 (Twelve) Hours. - Mouth/Throat    chlorhexidine (PERIDEX) 0.12 % solution 15 mL 15 mL Every 12 Hours 8/1/2019     Sig - Route: Apply 15 mL to the mouth or throat Every 12 (Twelve) Hours. - Mouth/Throat    clevidipine (CLEVIPREX) infusion 0.5 mg/mL 2-32 mg/hr Continuous PRN 8/1/2019     Sig - Route: Infuse 2-32 mg/hr into a venous catheter Continuous As Needed (See Admin Instructions). - Intravenous    cyclobenzaprine (FLEXERIL) tablet 10 mg 10 mg Every 8 Hours PRN 8/2/2019     Sig - Route: Take 1 tablet by mouth Every 8 (Eight) Hours As Needed for Muscle Spasms. - Oral    dexmedetomidine (PRECEDEX) 400 mcg/100 mL (4 mcg/mL) infusion 0.2-1.5 mcg/kg/hr × 124 kg Titrated 8/1/2019     Sig - Route: Infuse 24.8-186 mcg/hr into a venous catheter Dose Adjusted By Provider As Needed. - Intravenous    DOPamine 400 mg/250 mL (1.6 mg/mL) infusion 2-20 mcg/kg/min × 124 kg Continuous PRN 8/1/2019     Sig - Route: Infuse 248-2,480 mcg/min into a venous catheter Continuous As Needed (CVR Protocol). - Intravenous    enoxaparin (LOVENOX) syringe 40 mg 40 mg Every 24 Hours 8/2/2019     Sig - Route: Inject 0.4 mL under the skin into the appropriate area as directed Daily. - Subcutaneous    EPINEPHrine (ADRENALIN) 5 mg in sodium chloride 0.9 % 250 mL (0.02 mg/mL) infusion 0.02-0.3 mcg/kg/min × 124 kg Continuous PRN 8/1/2019     Sig - Route: Infuse 0.0025-0.0372 mg/min into a venous catheter Continuous As Needed (See Admin Instructions). - Intravenous    furosemide (LASIX) injection 40 mg 40 mg Every 6 Hours PRN 8/1/2019     Sig -  Route: Infuse 4 mL into a venous catheter Every 6 (Six) Hours As Needed (For PCWP or PAD greater than 15). - Intravenous    HYDROcodone-acetaminophen (NORCO) 5-325 MG per tablet 2 tablet 2 tablet Every 4 Hours PRN 8/1/2019 8/11/2019    Sig - Route: Take 2 tablets by mouth Every 4 (Four) Hours As Needed for Moderate Pain . - Oral    insulin regular (HumuLIN R,NovoLIN R) 100 Units in sodium chloride 0.9 % 100 mL (1 Units/mL) infusion 0-50 Units/hr Continuous PRN 8/1/2019     Sig - Route: Infuse 0-50 Units/hr into a venous catheter Continuous As Needed (Start if BG greater that 150 mg/dL). - Intravenous    magnesium hydroxide (MILK OF MAGNESIA) suspension 2400 mg/10mL 10 mL 10 mL Daily PRN 8/2/2019     Sig - Route: Take 10 mL by mouth Daily As Needed for Constipation. - Oral    magnesium sulfate in D5W 1g/100mL (PREMIX) 1 g Every 8 Hours 8/1/2019 8/2/2019    Sig - Route: Infuse 100 mL into a venous catheter Every 8 (Eight) Hours. - Intravenous    meperidine (DEMEROL) injection 25 mg 25 mg Every 4 Hours PRN 8/1/2019 8/2/2019    Sig - Route: Infuse 1 mL into a venous catheter Every 4 (Four) Hours As Needed for Shivering. - Intravenous    metoclopramide (REGLAN) injection 10 mg 10 mg Every 6 Hours 8/1/2019 8/2/2019    Sig - Route: Infuse 2 mL into a venous catheter Every 6 (Six) Hours. - Intravenous    metoprolol tartrate (LOPRESSOR) tablet 12.5 mg 12.5 mg On Call to O.R. 8/1/2019 8/1/2019    Sig - Route: Take 0.5 tablets by mouth On Call to the Operating Room. - Oral    Cosign for Ordering: Accepted by Christiano Zapien MD on 7/31/2019  1:40 PM    metoprolol tartrate (LOPRESSOR) tablet 12.5 mg 12.5 mg Every 12 Hours Scheduled 8/2/2019     Sig - Route: Take 0.5 tablets by mouth Every 12 (Twelve) Hours. - Oral    metoprolol tartrate (LOPRESSOR) tablet 50 mg 50 mg Once 7/31/2019 7/31/2019    Sig - Route: Take 1 tablet by mouth 1 (One) Time. - Oral    midazolam (VERSED) injection 2 mg 2 mg Every 1 Hour PRN 8/1/2019     Sig  "- Route: Infuse 2 mL into a venous catheter Every 1 (One) Hour As Needed for Sedation (while on ventilator). - Intravenous    milrinone 20 mg/100 mL (0.2 mg/mL) in 5 % dextrose infusion 0.25-0.75 mcg/kg/min × 124 kg Continuous PRN 8/1/2019     Sig - Route: Infuse 31-93 mcg/min into a venous catheter Continuous As Needed (See Admin Instructions). - Intravenous    morphine injection 1 mg 1 mg Every 4 Hours PRN 8/1/2019 8/11/2019    Sig - Route: Infuse 0.5 mL into a venous catheter Every 4 (Four) Hours As Needed for Moderate Pain . - Intravenous    Linked Group 3:  \"And\" Linked Group Details        morphine injection 4 mg 4 mg Every 30 Minutes PRN 8/1/2019 8/11/2019    Sig - Route: Infuse 2 mL into a venous catheter Every 30 (Thirty) Minutes As Needed for Severe Pain  (while on ventilator). - Intravenous    mupirocin (BACTROBAN) 2 % nasal ointment 1 application 1 application Every 12 Hours Scheduled 7/30/2019 7/31/2019    Sig - Route: 1 application by Each Nare route Every 12 (Twelve) Hours. - Each Nare    mupirocin (BACTROBAN) 2 % nasal ointment  2 Times Daily 8/1/2019     Sig - Route: by Each Nare route 2 (Two) Times a Day. - Each Nare    naloxone (NARCAN) injection 0.4 mg 0.4 mg Every 5 Minutes PRN 8/1/2019     Sig - Route: Infuse 1 mL into a venous catheter Every 5 (Five) Minutes As Needed for Respiratory Depression. - Intravenous    Linked Group 3:  \"And\" Linked Group Details        niCARdipine (CARDENE) 25 mg/250 mL (0.1 mg/mL) NS infusion kit 5-15 mg/hr Continuous PRN 8/1/2019     Sig - Route: Infuse 5-15 mg/hr into a venous catheter Continuous As Needed (See Admin Instructions). - Intravenous    nitroglycerin 50 mg/250 mL (0.2 mg/mL) infusion 5-200 mcg/min Titrated 8/1/2019     Sig - Route: Infuse 5-200 mcg/min into a venous catheter Dose Adjusted By Provider As Needed. - Intravenous    norepinephrine (LEVOPHED) 8 mg/250 mL (32 mcg/mL) in sodium chloride 0.9% infusion (premix) 0.02-0.3 mcg/kg/min × 124 kg " "Continuous PRN 8/1/2019     Sig - Route: Infuse 2.48-37.2 mcg/min into a venous catheter Continuous As Needed (See Admin Instructions). - Intravenous    ondansetron (ZOFRAN) injection 4 mg 4 mg Every 6 Hours PRN 8/1/2019     Sig - Route: Infuse 2 mL into a venous catheter Every 6 (Six) Hours As Needed for Nausea or Vomiting. - Intravenous    oxyCODONE (ROXICODONE) immediate release tablet 10 mg 10 mg Every 4 Hours PRN 8/1/2019 8/11/2019    Sig - Route: Take 2 tablets by mouth Every 4 (Four) Hours As Needed for Severe Pain . - Oral    pantoprazole (PROTONIX) EC tablet 40 mg 40 mg Every Early Morning 8/2/2019     Sig - Route: Take 1 tablet by mouth Every Morning. - Oral    phenylephrine (IVANNA-SYNEPHRINE) 50 mg in sodium chloride 0.9 % 250 mL (0.2 mg/mL) infusion 0.2-3 mcg/kg/min × 124 kg Continuous PRN 8/1/2019     Sig - Route: Infuse 24.8-372 mcg/min into a venous catheter Continuous As Needed (See Admin Instructions). - Intravenous    potassium chloride (KLOR-CON) packet 40 mEq 40 mEq As Needed 8/1/2019     Sig - Route: Take 40 mEq by mouth As Needed (potassium replacement, see admin instructions). - Oral    Linked Group 4:  \"Or\" Linked Group Details        potassium chloride (MICRO-K) CR capsule 40 mEq 40 mEq As Needed 8/1/2019     Sig - Route: Take 4 capsules by mouth As Needed (potassium replacement.  see admin instructions). - Oral    Linked Group 4:  \"Or\" Linked Group Details        potassium chloride 10 mEq in 100 mL IVPB 10 mEq Every 1 Hour PRN 8/1/2019     Sig - Route: Infuse 100 mL into a venous catheter Every 1 (One) Hour As Needed (for K+ less than or equal to 3.1). - Intravenous    Linked Group 5:  \"Or\" Linked Group Details        potassium chloride 10 mEq in 100 mL IVPB 10 mEq Every 1 Hour PRN 8/1/2019     Sig - Route: Infuse 100 mL into a venous catheter Every 1 (One) Hour As Needed (for K+ 3.2 - 3.6). - Intravenous    Linked Group 5:  \"Or\" Linked Group Details        potassium chloride 20 mEq in 50 mL " IVPB 20 mEq Every 1 Hour PRN 8/1/2019     Sig - Route: Infuse 50 mL into a venous catheter Every 1 (One) Hour As Needed (for K+ less than or equal to 3.1). - Intravenous    potassium chloride 20 mEq in 50 mL IVPB 20 mEq Every 1 Hour PRN 8/1/2019     Sig - Route: Infuse 50 mL into a venous catheter Every 1 (One) Hour As Needed (for K+ 3.2 - 3.6). - Intravenous    potassium chloride 20 mEq in 50 mL IVPB 20 mEq Every 1 Hour PRN 8/1/2019     Sig - Route: Infuse 50 mL into a venous catheter Every 1 (One) Hour As Needed (for K+ 3.7 - 4.0). - Intravenous    propofol (DIPRIVAN) infusion 10 mg/mL 100 mL 5-50 mcg/kg/min × 124 kg Continuous PRN 8/1/2019     Sig - Route: Infuse 620-6,200 mcg/min into a venous catheter Continuous As Needed (See Admin Instructions). - Intravenous    sennosides-docusate sodium (SENOKOT-S) 8.6-50 MG tablet 2 tablet 2 tablet Nightly 8/2/2019     Sig - Route: Take 2 tablets by mouth Every Night. - Oral    sodium chloride 0.9 % flush 30 mL 30 mL Once As Needed 8/1/2019     Sig - Route: Infuse 30 mL into a venous catheter 1 (One) Time As Needed for Line Care. - Intravenous    sodium chloride 0.9 % infusion 30 mL/hr Continuous 8/1/2019     Sig - Route: Infuse 30 mL/hr into a venous catheter Continuous. - Intravenous    sodium chloride 0.9 % infusion 30 mL/hr Continuous PRN 8/1/2019     Sig - Route: Infuse 30 mL/hr into a venous catheter Continuous As Needed (if insulin infusion rate is insufficient to maintain IV patency.). - Intravenous    vasopressin (PITRESSIN) 20 Units in sodium chloride 0.9 % 100 mL (0.2 Units/mL) infusion 0.02-0.1 Units/min Continuous PRN 8/1/2019     Sig - Route: Infuse 0.02-0.1 Units/min into a venous catheter Continuous As Needed (See Admin Instructions). - Intravenous          Operative/Procedure Notes      Christiano Zapien MD at 8/1/2019  8:00 AM        Date of procedure: 8/1/2019.    Preoperative diagnosis: ST elevation myocardial infarction, severe three-vessel coronary  artery disease, unstable angina, morbid obesity, obstructive sleep apnea, hypertension, hyperlipidemia.    Postoperative diagnosis: Same.    Procedure: Urgent coronary artery bypass x5 with LIMA to distal LAD, saphenous vein to diagonal, saphenous vein to OM 2, saphenous vein sequential to PDA and left ventricular branch.  Left lower extremity endoscopic vein harvest.  PRP application to LIMA bed and sternum.  Intraoperative transesophageal echocardiogram.    Surgeon: Christiano Zapien MD.    Assistant: JESSICA Simon CSA.    Cardiologist: DIMITRY Santos MD.    Anesthesia: GETA.    Findings: No atherosclerotic plaque on aortic palpation.  Transesophageal echocardiogram showed very mild mitral valve regurgitation and left ventricular ejection fraction of 30%.  Following coronary bypasses left ventricular ejection fraction improved to 40%.  Left internal mammary artery and saphenous vein adequate.  Left ventricular branch 1.75 mm, PDA 2 mm, OM 2 2 mm, diagonal 1.75 mm, distal LAD 1.75 mm.    Aortic cross-clamp time: 85 minutes.    Cardiopulmonary bypass time: 109 minutes.    Cell Saver: 500 mL.    Antegrade and retrograde cardioplegia.    Arterial: 22 Canadian to aorta.    Venous: 39/46 Canadian to right atrium.    History of present illness: This is a pleasant 54-year-old  gentleman who suffered as ST elevation myocardial infarction at [facility]; work-up included a left heart catheterization revealing severe three-vessel coronary artery disease.  The patient was transferred to [facility] for further evaluation and potential surgical revascularization.  His STS risks for morbidity and mortality were calculated and discussed with him; he was counseled consented for surgery.    Details: The patient was identified in the prep and holding area.  He was taken to the operating room.  Following easy induction he received a single-lumen endotracheal tube.  He received a PA catheter, radial arterial line,  and Alan catheter.  He was positioned and then prepped and draped.  A midline sternotomy incision was made.  The sternum was split with electric saw.  The left hemisternum was retracted.  The left internal mammary artery was harvested as a pedicle.  The patient was heparinized.  The pedicle was transected distally and the distal stump was controlled.  The pedicle was fashioned.  During LIMA harvest the patient underwent endoscopic abrasion of his left lower extremity; this revealed adequate saphenous vein conduit which was harvested and prepared.    The sternum was retracted.  The pericardium was opened with electrocautery.  A pericardial well was created.  The aorta was palpated found to be free of any worrisome atherosclerotic plaque.  ACT was confirmed.  A 22 Argentine cannula was inserted into the ascending aorta.  A 39/46 Argentine cannula was inserted in the right atrium.  An antegrade cardioplegia needle was inserted into the ascending aorta.  A retrograde cardioplegia cannula was inserted into the coronary sinus via the right atrium.    Cardiopulmonary bypass was instituted and the patient was cooled to 35 °C.  An aortic cross-clamp was applied and the patient received 1000 mL of antegrade cardioplegia, resulting in diastolic arrest.  The patient also received a test dose of 100 mL retrograde cardioplegia.  Between distal anastomoses the patient received additional 200 mL retrograde cardioplegia doses.  The heart was retracted.  Arteriotomies were made upon the left ventricular branch and PDA.  A saphenous vein segment was anastomosed to the 2 targets in a sequential fashion with the side to side to the PDA and the distal end to side to the left ventricular branch.  A proximal anastomosis was then completed to an aortotomy.  This was marked with a metallic ring.  The OM branch was identified and an arteriotomy was performed.  A separate saphenous vein segment was anastomosed to this.  This was then anastomosed  proximally to a separate aortotomy.  This was also marked.  The diagonal branch was identified and an arteriotomy was performed.  A separate saphenous vein segment was anastomosed to this as well.  This was then also anastomosed proximally to a separate aortotomy.  This was also marked.  A pericardial window was created and used to deliver LIMA pedicle into the pericardial well.  A distal LAD arteriotomy was performed and then anastomosed to the end of the pedicle.  This was hemostatic.  The pedicle was anchored to the epicardial surface.  The patient received a hotshot antegrade cardioplegia dose.  After this dose the aortic cross-clamp was removed and the bulla clamp was removed from the LIMA pedicle.  The patient recovered into sinus rhythm.  The saphenous veins were de-aired.  The bulla clamps were then removed.  The proximal distal anastomoses were examined they were hemostatic.  Pacing wires were applied to the right atrium and right ventricle.  The retrograde cannula was removed.  After sufficient de-airing the antegrade cardioplegia line/vent was removed.  The patient was weaned off cardiopulmonary bypass.    The venous line was removed.  The patient received a full dose of protamine.  The arterial line was removed.  The pericardium was approximated loosely.  A substernal chest was placed.  8 wires were applied to sternum and used to approximated.  Sponge counts, instrument counts, and needle counts were correct.  The fascia layer, subdermal layer, and subcuticular layer were approximated with running absorbable sutures.  The leg incision was closed in similar fashion.  Bandages were applied.  The patient was transferred to the CVR.    Electronically signed by Christiano Zapien MD at 8/1/2019 12:08 PM           Toyin Talavera RN   Registered Nurse      Plan of Care   Signed   Date of Service:  8/1/2019  5:31 AM   Creation Time:  8/1/2019  5:31 AM            Signed           Problem: Patient Care  Overview  Goal: Plan of Care Review  Outcome: Ongoing (interventions implemented as appropriate)    08/01/19 0529   Coping/Psychosocial   Plan of Care Reviewed With patient   Plan of Care Review   Progress no change   OTHER   Outcome Summary Pt VSS, on heparin/nitro gtts currently, remains chest pain free, plan for first case open heart with Dr. Zapien in , Zelda King RN      Case Management   Progress Notes   Signed   Date of Service:  7/31/2019  2:04 PM   Creation Time:  7/31/2019  2:04 PM            Signed                 Discharge Planning Assessment  [facility]     Patient Name: Marco Singletary                MRN: [id number]  Today's Date: 7/31/2019                     Admit Date: 7/30/2019                  Discharge Plan      Row Name 07/31/19 1400           Plan     Plan  Home denies needs     Plan Comments  CCP spoke to patient at bedside to discuss discharge planning.  CCP role explained.  Face sheet verified.  His wife Priscilla, [phone], is his emergency contact. Pt lives in a two story house with his wife, and adult son.. He uses no DME to ambulate.  He is independent with ALD's.  His pharmacy is [facility] in [facility].  He has no physical rehab history. He has no history of HH.   Plan is Home.  CCP  following

## 2019-08-01 NOTE — PLAN OF CARE
Problem: Patient Care Overview  Goal: Plan of Care Review  Outcome: Ongoing (interventions implemented as appropriate)   08/01/19 4063   Coping/Psychosocial   Plan of Care Reviewed With patient   Plan of Care Review   Progress no change   OTHER   Outcome Summary Pt VSS, on heparin/nitro gtts currently, remains chest pain free, plan for first case open heart with Dr. Zapien in , [facility]

## 2019-08-01 NOTE — ANESTHESIA PROCEDURE NOTES
Left Radial A-Line      Patient reassessed immediately prior to procedure    Patient location during procedure: OR  Start time: 8/1/2019 6:58 AM  Stop Time:8/1/2019 7:05 AM       Line placed for hemodynamic monitoring and ABGs/Labs/ISTAT.  Performed By   Anesthesiologist: Bishop Mcfarland MD  Preanesthetic Checklist  Completed: patient identified, site marked, surgical consent, pre-op evaluation, timeout performed, IV checked, risks and benefits discussed and monitors and equipment checked  Arterial Line Prep   Sterile Tech: gloves, mask, cap and sterile barriers  Prep: ChloraPrep  Patient monitoring: blood pressure monitoring, continuous pulse oximetry and EKG  Arterial Line Procedure   Laterality:left  Location:  radial artery  Catheter size: 20 G   Guidance: ultrasound guided  PROCEDURE NOTE/ULTRASOUND INTERPRETATION.  Using ultrasound guidance the potential vascular sites for insertion of the catheter were visualized to determine the patency of the vessel to be used for vascular access.  After selecting the appropriate site for insertion, the needle was visualized under ultrasound being inserted into the radial artery, followed by ultrasound confirmation of wire and catheter placement. There were no abnormalities seen on ultrasound; an image was taken; and the patient tolerated the procedure with no complications.   Number of attempts: 1  Successful placement: yes  Post Assessment   Dressing Type: occlusive dressing applied, secured with tape and wrist guard applied.   Complications no  Circ/Move/Sens Assessment: normal and unchanged.   Patient Tolerance: patient tolerated the procedure well with no apparent complications  Additional Notes  Micropuncture set utilized

## 2019-08-01 NOTE — PROGRESS NOTES
Palomo Tao MD                          [phone]      Patient ID:    Name:  Marco Singletary    MRN:  [id number]    1965   54 y.o.  male            Patient Care Team:  Provider, No Known as PCP - General    CC/ Reason for visit: F/u SOA     Subjective: Pt seen and examined this AM.  Patient taken to the OR this morning for urgent bypass surgery.  I am seeing the patient after he had an uneventful procedure.  I have reviewed the operative report and evaluated the patient in the cardiovascular unit.  He remains on mechanical ventilator with significant support.  He is still on multiple pressors.  ABG shows some metabolic acidosis and he has been given bicarb pushes.  Repeat ABG is pending.  Discussed with the nurse and the respiratory therapist    ROS: Unable to obtain    Objective     Vital Signs past 24hrs    BP range: BP: ()/() 91/55  Pulse range: Heart Rate:  [0-95] 88  Resp rate range: Resp:  [14] 14  Temp range: Temp (24hrs), Av.4 °F (36.9 °C), Min:98 °F (36.7 °C), Max:98.6 °F (37 °C)      Ventilator/Non-Invasive Ventilation Settings (From admission, onward)    None          Device (Oxygen Therapy): ventilator FiO2 (%): 79 %     124 kg (273 lb 5.9 oz); Body mass index is 37.08 kg/m².      Intake/Output Summary (Last 24 hours) at 2019 1518  Last data filed at 2019 1430  Gross per 24 hour   Intake 2501 ml   Output 4745 ml   Net -2244 ml       PHYSICAL EXAM   Constitutional: Middle aged morbidly obese pt in bed sedated on the mechanical ventilator  Head: - NCAT  Eyes: No pallor, Anicteric conjunctiva, EOMI.  ENMT:   Endotracheal tube in place, moist mucous membrane   NECK: Trachea midline, No thyromegaly, no palpable cervical lymphadenopathy  Heart: RRR, no murmur. 1+ pedal edema, sternotomy with bandage, he has no chest tube   Lungs: JAIMEE +, decreased breath sounds at the bases, no wheezes/ crackles heard    Abdomen: Soft. No tenderness,  guarding or rigidity. No palpable masses  Extremities: Extremities warm and well perfused. No cyanosis/ clubbing.  Groin venous and arterial sheaths.  No palpable hematoma or bruising  Neuro: Sedated on the mechanical ventilator  Psych: Mood and affect unable to obtain    Scheduled meds:      acetaminophen 650 mg Oral Q4H   Or      acetaminophen 650 mg Oral Q4H   Or      acetaminophen 650 mg Rectal Q4H   [START ON 8/2/2019] aspirin 325 mg Oral Daily   atorvastatin 40 mg Oral Nightly   ceFAZolin 3 g Intravenous Q8H   chlorhexidine 15 mL Mouth/Throat Q12H   [START ON 8/2/2019] enoxaparin 40 mg Subcutaneous Q24H   magnesium sulfate 1 g Intravenous Q8H   metoclopramide 10 mg Intravenous Q6H   [START ON 8/2/2019] metoprolol tartrate 12.5 mg Oral Q12H   mupirocin  Each Nare BID   [START ON 8/2/2019] pantoprazole 40 mg Oral Q AM   [START ON 8/2/2019] sennosides-docusate sodium 2 tablet Oral Nightly       IV meds:                          amiodarone 0.5 mg/min Last Rate: 0.5 mg/min (08/01/19 1347)   clevidipine 2-32 mg/hr    dexmedetomidine 0.2-1.5 mcg/kg/hr Last Rate: 0.5 mcg/kg/hr (08/01/19 1336)   DOPamine 2-20 mcg/kg/min    EPINEPHrine 0.02-0.3 mcg/kg/min    insulin 0-50 Units/hr    milrinone 0.25-0.75 mcg/kg/min    niCARdipine 5-15 mg/hr    nitroglycerin 5-200 mcg/min    norepinephrine 0.02-0.3 mcg/kg/min Last Rate: 0.02 mcg/kg/min (08/01/19 1507)   phenylephrine 0.2-3 mcg/kg/min    propofol 5-50 mcg/kg/min Last Rate: 10 mcg/kg/min (08/01/19 1516)   sodium chloride 30 mL/hr Last Rate: 30 mL/hr (08/01/19 1335)   sodium chloride 30 mL/hr Last Rate: 30 mL/hr (08/01/19 1300)   vasopressin 0.02-0.1 Units/min        Data Review:      Results from last 7 days   Lab Units 08/01/19  1212 08/01/19  1133 08/01/19  1102  08/01/19  0436 07/31/19  0609 07/30/19  1646   SODIUM mmol/L 133*  --   --   --   --  135* 137   POTASSIUM mmol/L 4.9  --   --   --   --  4.1 4.4   CHLORIDE mmol/L 99  --   --   --   --  102 105   CO2 mmol/L 21.6*   --   --   --   --  20.2* 21.2*   BUN mg/dL 11  --   --   --   --  9 9   CREATININE mg/dL 1.44*  --   --   --   --  0.82 0.86   CALCIUM mg/dL 9.1  --   --   --   --  8.8 8.7   BILIRUBIN mg/dL  --   --   --   --   --   --  0.5   ALK PHOS U/L  --   --   --   --   --   --  63   ALT (SGPT) U/L  --   --   --   --   --   --  41   AST (SGOT) U/L  --   --   --   --   --   --  80*   GLUCOSE mg/dL 165*  --   --   --   --  140* 111*   WBC 10*3/mm3 34.32*  --   --   --  12.40* 16.80* 12.15*   HEMOGLOBIN g/dL 11.2*  --   --   --  12.1* 12.7* 13.2   HEMOGLOBIN, POC g/dL  --  10.9* 11.2*   < >  --   --   --    PLATELETS 10*3/mm3 225  --   --   --  217 245 260   INR  1.37*  --   --   --   --   --  1.05   PROBNP pg/mL  --   --   --   --   --   --  152.5    < > = values in this interval not displayed.       Lab Results   Component Value Date    CALCIUM 9.1 08/01/2019    PHOS 3.4 08/01/2019             Results from last 7 days   Lab Units 08/01/19  1509 08/01/19  1328 08/01/19  1133 08/01/19  1102 08/01/19  1038 08/01/19  1006 08/01/19  0937  07/30/19  1645   PH, ARTERIAL pH units 7.367 7.284* 7.24* 7.27* 7.28* 7.30* 7.26*   < > 7.404   PO2 ART mm Hg 87.3 123.8*  --   --   --   --   --   --  77.6*   PCO2, ARTERIAL mm Hg 39.0 43.1  --   --   --   --   --   --  35.9   HCO3 ART mmol/L 22.4 20.4*  --   --   --   --   --   --  22.4    < > = values in this interval not displayed.        Results Review:    I have reviewed the relevant laboratory results and reviewed the chest imaging from this hospitalization personally and summarized it below    Assessment    Acute ST elevation MI with 3vd s/p CABG 8/1   Postop hypoxia  Postop hypotension  SUZAN  Hypertension  Leukocytosis with no signs of infection  Undiagnosed obstructive sleep apnea  Morbid obesity    PLAN:  Patient now in the cardiovascular unit post CABG.  He is on a mechanical ventilator and requiring vasopressor support along with significant amount of mechanical ventilator support  We  will decrease FiO2.  Reviewed ABG shows some acidosis and has received bicarb pushes.  We will await clinical recovery before considering extubation.  Severe following protocol.  Diuresis per cardiology.  Chest x-ray reviewed showing nonspecific changes with mild atelectasis and some venous congestion.  PA pressures around 18  We will continue to follow the patient closely in the management perioperatively.  Full Code   DVT ppx     CC - 32 mins    I have discussed my findings and recommendations with patient and nursing staff and RT     Palomo Tao MD  8/1/2019

## 2019-08-01 NOTE — OP NOTE
Date of procedure: 8/1/2019.    Preoperative diagnosis: ST elevation myocardial infarction, severe three-vessel coronary artery disease, unstable angina, morbid obesity, obstructive sleep apnea, hypertension, hyperlipidemia.    Postoperative diagnosis: Same.    Procedure: Urgent coronary artery bypass x5 with LIMA to distal LAD, saphenous vein to diagonal, saphenous vein to OM 2, saphenous vein sequential to PDA and left ventricular branch.  Left lower extremity endoscopic vein harvest.  PRP application to LIMA bed and sternum.  Intraoperative transesophageal echocardiogram.    Surgeon: Christiano Zapien MD.    Assistant: JESSICA Simon CSA.    Cardiologist: DIMITRY Santos MD.    Anesthesia: GETA.    Findings: No atherosclerotic plaque on aortic palpation.  Transesophageal echocardiogram showed very mild mitral valve regurgitation and left ventricular ejection fraction of 30%.  Following coronary bypasses left ventricular ejection fraction improved to 40%.  Left internal mammary artery and saphenous vein adequate.  Left ventricular branch 1.75 mm, PDA 2 mm, OM 2 2 mm, diagonal 1.75 mm, distal LAD 1.75 mm.    Aortic cross-clamp time: 85 minutes.    Cardiopulmonary bypass time: 109 minutes.    Cell Saver: 500 mL.    Antegrade and retrograde cardioplegia.    Arterial: 22 Wallisian to aorta.    Venous: 39/46 Wallisian to right atrium.    History of present illness: This is a pleasant 54-year-old  gentleman who suffered as ST elevation myocardial infarction at [facility]; work-up included a left heart catheterization revealing severe three-vessel coronary artery disease.  The patient was transferred to [facility] for further evaluation and potential surgical revascularization.  His STS risks for morbidity and mortality were calculated and discussed with him; he was counseled consented for surgery.    Details: The patient was identified in the prep and holding area.  He was taken to the operating room.   Following easy induction he received a single-lumen endotracheal tube.  He received a PA catheter, radial arterial line, and Alan catheter.  He was positioned and then prepped and draped.  A midline sternotomy incision was made.  The sternum was split with electric saw.  The left hemisternum was retracted.  The left internal mammary artery was harvested as a pedicle.  The patient was heparinized.  The pedicle was transected distally and the distal stump was controlled.  The pedicle was fashioned.  During LIMA harvest the patient underwent endoscopic abrasion of his left lower extremity; this revealed adequate saphenous vein conduit which was harvested and prepared.    The sternum was retracted.  The pericardium was opened with electrocautery.  A pericardial well was created.  The aorta was palpated found to be free of any worrisome atherosclerotic plaque.  ACT was confirmed.  A 22 Ethiopian cannula was inserted into the ascending aorta.  A 39/46 Ethiopian cannula was inserted in the right atrium.  An antegrade cardioplegia needle was inserted into the ascending aorta.  A retrograde cardioplegia cannula was inserted into the coronary sinus via the right atrium.    Cardiopulmonary bypass was instituted and the patient was cooled to 35 °C.  An aortic cross-clamp was applied and the patient received 1000 mL of antegrade cardioplegia, resulting in diastolic arrest.  The patient also received a test dose of 100 mL retrograde cardioplegia.  Between distal anastomoses the patient received additional 200 mL retrograde cardioplegia doses.  The heart was retracted.  Arteriotomies were made upon the left ventricular branch and PDA.  A saphenous vein segment was anastomosed to the 2 targets in a sequential fashion with the side to side to the PDA and the distal end to side to the left ventricular branch.  A proximal anastomosis was then completed to an aortotomy.  This was marked with a metallic ring.  The OM branch was identified  and an arteriotomy was performed.  A separate saphenous vein segment was anastomosed to this.  This was then anastomosed proximally to a separate aortotomy.  This was also marked.  The diagonal branch was identified and an arteriotomy was performed.  A separate saphenous vein segment was anastomosed to this as well.  This was then also anastomosed proximally to a separate aortotomy.  This was also marked.  A pericardial window was created and used to deliver LIMA pedicle into the pericardial well.  A distal LAD arteriotomy was performed and then anastomosed to the end of the pedicle.  This was hemostatic.  The pedicle was anchored to the epicardial surface.  The patient received a hotshot antegrade cardioplegia dose.  After this dose the aortic cross-clamp was removed and the bulla clamp was removed from the LIMA pedicle.  The patient recovered into sinus rhythm.  The saphenous veins were de-aired.  The bulla clamps were then removed.  The proximal distal anastomoses were examined they were hemostatic.  Pacing wires were applied to the right atrium and right ventricle.  The retrograde cannula was removed.  After sufficient de-airing the antegrade cardioplegia line/vent was removed.  The patient was weaned off cardiopulmonary bypass.    The venous line was removed.  The patient received a full dose of protamine.  The arterial line was removed.  The pericardium was approximated loosely.  A substernal chest was placed.  8 wires were applied to sternum and used to approximated.  Sponge counts, instrument counts, and needle counts were correct.  The fascia layer, subdermal layer, and subcuticular layer were approximated with running absorbable sutures.  The leg incision was closed in similar fashion.  Bandages were applied.  The patient was transferred to the CVR.

## 2019-08-01 NOTE — ANESTHESIA PROCEDURE NOTES
Right IJ Central Line      Patient reassessed immediately prior to procedure    Patient location during procedure: OR  Start time: 8/1/2019 7:12 AM  Stop Time:8/1/2019 7:25 AM  Indications: central pressure monitoring and vascular access  Staff  Anesthesiologist: Bishop Mcfarland MD  Preanesthetic Checklist  Completed: patient identified, site marked, surgical consent, pre-op evaluation, timeout performed, IV checked, risks and benefits discussed and monitors and equipment checked  Central Line Prep  Sterile Tech:cap, gloves, gown, mask and sterile barriers  Prep: chloraprep  Patient monitoring: blood pressure monitoring, continuous pulse oximetry and EKG  Central Line Procedure  Laterality:right  Location:internal jugular  Catheter Type:double lumen and MAC  Catheter Size:9 Fr  Guidance:ultrasound guided  PROCEDURE NOTE/ULTRASOUND INTERPRETATION.  Using ultrasound guidance the potential vascular sites for insertion of the catheter were visualized to determine the patency of the vessel to be used for vascular access.  After selecting the appropriate site for insertion, the needle was visualized under ultrasound being inserted into the internal jugular vein, followed by ultrasound confirmation of wire and catheter placement. There were no abnormalities seen on ultrasound; an image was taken; and the patient tolerated the procedure with no complications. Images: still images obtained, printed/placed on chart  Assessment  Post procedure:biopatch applied, line sutured and occlusive dressing applied  Assessement:blood return through all ports, free fluid flow and chest x-ray ordered  Complications:no  Patient Tolerance:patient tolerated the procedure well with no apparent complications

## 2019-08-01 NOTE — ANESTHESIA PROCEDURE NOTES
Procedure Performed: Emergent/Open-Heart Anesthesia ERICA     Start Time:        End Time:      Preanesthesia Checklist:  Patient identified, IV assessed, risks and benefits discussed, monitors and equipment assessed, procedure being performed at surgeon's request and anesthesia consent obtained.    General Procedure Information  ERICA Placed for monitoring purposes only -- This is not a diagnostic ERICA

## 2019-08-01 NOTE — ANESTHESIA PROCEDURE NOTES
Airway  Urgency: elective    Airway not difficult    General Information and Staff    Patient location during procedure: OR  Anesthesiologist: Bishop Mcfarland MD    Indications and Patient Condition  Indications for airway management: airway protection    Preoxygenated: yes  Mask difficulty assessment: 2 - vent by mask + OA or adjuvant +/- NMBA (fairly easy one person mask with OA in place)    Final Airway Details  Final airway type: endotracheal airway      Successful airway: ETT  Cuffed: yes   Successful intubation technique: direct laryngoscopy  Endotracheal tube insertion site: oral  Blade: Dg  Blade size: 4  ETT size (mm): 8.0  Cormack-Lehane Classification: grade IIa - partial view of glottis  Placement verified by: chest auscultation and capnometry   Measured from: teeth  ETT to teeth (cm): 23  Number of attempts at approach: 1

## 2019-08-01 NOTE — PROGRESS NOTES
Patient vomited at 1600.  By report no vomitus in airway when ET was suctioned.    Stat portable chest x-ray ordered.  Stat arterial blood gas ordered.  Broad-spectrum antibiotics ordered.

## 2019-08-01 NOTE — NURSING NOTE
Preop bactroban/oral mouth wash/chlorhexidine baths given for open heart this am  Unable to to document in MAR bc order was released yesterday    Toyin Talavera RN

## 2019-08-02 ENCOUNTER — APPOINTMENT (OUTPATIENT)
Dept: GENERAL RADIOLOGY | Facility: HOSPITAL | Age: 54
End: 2019-08-02

## 2019-08-02 LAB
ALBUMIN SERPL-MCNC: 4.5 G/DL (ref 3.5–5.2)
ANION GAP SERPL CALCULATED.3IONS-SCNC: 13.4 MMOL/L (ref 5–15)
ARTERIAL PATENCY WRIST A: ABNORMAL
ARTERIAL PATENCY WRIST A: ABNORMAL
ATMOSPHERIC PRESS: 755.3 MMHG
ATMOSPHERIC PRESS: 756 MMHG
BASE EXCESS BLDA CALC-SCNC: -1.7 MMOL/L (ref 0–2)
BASE EXCESS BLDA CALC-SCNC: -2.3 MMOL/L (ref 0–2)
BASOPHILS # BLD AUTO: 0.03 10*3/MM3 (ref 0–0.2)
BASOPHILS NFR BLD AUTO: 0.1 % (ref 0–1.5)
BDY SITE: ABNORMAL
BDY SITE: ABNORMAL
BUN BLD-MCNC: 14 MG/DL (ref 6–20)
BUN/CREAT SERPL: 10.9 (ref 7–25)
CALCIUM SPEC-SCNC: 8.3 MG/DL (ref 8.6–10.5)
CHLORIDE SERPL-SCNC: 106 MMOL/L (ref 98–107)
CO2 SERPL-SCNC: 22.6 MMOL/L (ref 22–29)
CREAT BLD-MCNC: 1.29 MG/DL (ref 0.76–1.27)
DEPRECATED RDW RBC AUTO: 45.7 FL (ref 37–54)
EOSINOPHIL # BLD AUTO: 0 10*3/MM3 (ref 0–0.4)
EOSINOPHIL NFR BLD AUTO: 0 % (ref 0.3–6.2)
ERYTHROCYTE [DISTWIDTH] IN BLOOD BY AUTOMATED COUNT: 14.2 % (ref 12.3–15.4)
GFR SERPL CREATININE-BSD FRML MDRD: 58 ML/MIN/1.73
GLUCOSE BLD-MCNC: 132 MG/DL (ref 65–99)
GLUCOSE BLDC GLUCOMTR-MCNC: 113 MG/DL (ref 70–130)
GLUCOSE BLDC GLUCOMTR-MCNC: 119 MG/DL (ref 70–130)
GLUCOSE BLDC GLUCOMTR-MCNC: 120 MG/DL (ref 70–130)
GLUCOSE BLDC GLUCOMTR-MCNC: 128 MG/DL (ref 70–130)
GLUCOSE BLDC GLUCOMTR-MCNC: 133 MG/DL (ref 70–130)
GLUCOSE BLDC GLUCOMTR-MCNC: 149 MG/DL (ref 70–130)
GLUCOSE BLDC GLUCOMTR-MCNC: 156 MG/DL (ref 70–130)
GLUCOSE BLDC GLUCOMTR-MCNC: 162 MG/DL (ref 70–130)
HCO3 BLDA-SCNC: 21.2 MMOL/L (ref 22–28)
HCO3 BLDA-SCNC: 21.7 MMOL/L (ref 22–28)
HCT VFR BLD AUTO: 35 % (ref 37.5–51)
HGB BLD-MCNC: 10.9 G/DL (ref 13–17.7)
HOROWITZ INDEX BLD+IHG-RTO: 40 %
HOROWITZ INDEX BLD+IHG-RTO: 40 %
IMM GRANULOCYTES # BLD AUTO: 0.15 10*3/MM3 (ref 0–0.05)
IMM GRANULOCYTES NFR BLD AUTO: 0.7 % (ref 0–0.5)
INR PPP: 1.27 (ref 0.9–1.1)
LYMPHOCYTES # BLD AUTO: 1.25 10*3/MM3 (ref 0.7–3.1)
LYMPHOCYTES NFR BLD AUTO: 5.7 % (ref 19.6–45.3)
MAGNESIUM SERPL-MCNC: 2.5 MG/DL (ref 1.6–2.6)
MCH RBC QN AUTO: 27.7 PG (ref 26.6–33)
MCHC RBC AUTO-ENTMCNC: 31.1 G/DL (ref 31.5–35.7)
MCV RBC AUTO: 88.8 FL (ref 79–97)
MODALITY: ABNORMAL
MODALITY: ABNORMAL
MONOCYTES # BLD AUTO: 2.13 10*3/MM3 (ref 0.1–0.9)
MONOCYTES NFR BLD AUTO: 9.8 % (ref 5–12)
NEUTROPHILS # BLD AUTO: 18.27 10*3/MM3 (ref 1.7–7)
NEUTROPHILS NFR BLD AUTO: 83.7 % (ref 42.7–76)
NRBC BLD AUTO-RTO: 0 /100 WBC (ref 0–0.2)
O2 A-A PPRESDIFF RESPIRATORY: 0.3 MMHG
O2 A-A PPRESDIFF RESPIRATORY: 0.4 MMHG
PCO2 BLDA: 31.5 MM HG (ref 35–45)
PCO2 BLDA: 31.8 MM HG (ref 35–45)
PEEP RESPIRATORY: 7.5 CM[H2O]
PEEP RESPIRATORY: 7.5 CM[H2O]
PH BLDA: 7.44 PH UNITS (ref 7.35–7.45)
PH BLDA: 7.44 PH UNITS (ref 7.35–7.45)
PHOSPHATE SERPL-MCNC: 3.7 MG/DL (ref 2.5–4.5)
PLATELET # BLD AUTO: 198 10*3/MM3 (ref 140–450)
PMV BLD AUTO: 10.4 FL (ref 6–12)
PO2 BLDA: 86.5 MM HG (ref 80–100)
PO2 BLDA: 98.6 MM HG (ref 80–100)
POTASSIUM BLD-SCNC: 3.7 MMOL/L (ref 3.5–5.2)
POTASSIUM BLD-SCNC: 4 MMOL/L (ref 3.5–5.2)
PROTHROMBIN TIME: 15.6 SECONDS (ref 11.7–14.2)
PSV: 5 CMH2O
RBC # BLD AUTO: 3.94 10*6/MM3 (ref 4.14–5.8)
SAO2 % BLDCOA: 97.1 % (ref 92–99)
SAO2 % BLDCOA: 98 % (ref 92–99)
SET MECH RESP RATE: 16
SODIUM BLD-SCNC: 142 MMOL/L (ref 136–145)
TOTAL RATE: 16 BREATHS/MINUTE
TOTAL RATE: 30 BREATHS/MINUTE
VENTILATOR MODE: ABNORMAL
VENTILATOR MODE: AC
VT ON VENT VENT: 628 ML
VT ON VENT VENT: 704 ML
WBC NRBC COR # BLD: 21.83 10*3/MM3 (ref 3.4–10.8)

## 2019-08-02 PROCEDURE — 25010000002 AMIODARONE IN DEXTROSE 5% 360-4.14 MG/200ML-% SOLUTION: Performed by: THORACIC SURGERY (CARDIOTHORACIC VASCULAR SURGERY)

## 2019-08-02 PROCEDURE — 97110 THERAPEUTIC EXERCISES: CPT

## 2019-08-02 PROCEDURE — 94799 UNLISTED PULMONARY SVC/PX: CPT

## 2019-08-02 PROCEDURE — 83735 ASSAY OF MAGNESIUM: CPT | Performed by: THORACIC SURGERY (CARDIOTHORACIC VASCULAR SURGERY)

## 2019-08-02 PROCEDURE — 84132 ASSAY OF SERUM POTASSIUM: CPT | Performed by: THORACIC SURGERY (CARDIOTHORACIC VASCULAR SURGERY)

## 2019-08-02 PROCEDURE — 93005 ELECTROCARDIOGRAM TRACING: CPT | Performed by: THORACIC SURGERY (CARDIOTHORACIC VASCULAR SURGERY)

## 2019-08-02 PROCEDURE — 99233 SBSQ HOSP IP/OBS HIGH 50: CPT | Performed by: NURSE PRACTITIONER

## 2019-08-02 PROCEDURE — 25010000002 FUROSEMIDE PER 20 MG: Performed by: THORACIC SURGERY (CARDIOTHORACIC VASCULAR SURGERY)

## 2019-08-02 PROCEDURE — 97162 PT EVAL MOD COMPLEX 30 MIN: CPT

## 2019-08-02 PROCEDURE — 25010000002 METOCLOPRAMIDE PER 10 MG: Performed by: THORACIC SURGERY (CARDIOTHORACIC VASCULAR SURGERY)

## 2019-08-02 PROCEDURE — 80069 RENAL FUNCTION PANEL: CPT | Performed by: THORACIC SURGERY (CARDIOTHORACIC VASCULAR SURGERY)

## 2019-08-02 PROCEDURE — 25010000003 POTASSIUM CHLORIDE PER 2 MEQ: Performed by: THORACIC SURGERY (CARDIOTHORACIC VASCULAR SURGERY)

## 2019-08-02 PROCEDURE — 85610 PROTHROMBIN TIME: CPT | Performed by: THORACIC SURGERY (CARDIOTHORACIC VASCULAR SURGERY)

## 2019-08-02 PROCEDURE — 94003 VENT MGMT INPAT SUBQ DAY: CPT

## 2019-08-02 PROCEDURE — 82962 GLUCOSE BLOOD TEST: CPT

## 2019-08-02 PROCEDURE — 82803 BLOOD GASES ANY COMBINATION: CPT

## 2019-08-02 PROCEDURE — 25010000002 CALCIUM GLUCONATE PER 10 ML: Performed by: THORACIC SURGERY (CARDIOTHORACIC VASCULAR SURGERY)

## 2019-08-02 PROCEDURE — 71045 X-RAY EXAM CHEST 1 VIEW: CPT

## 2019-08-02 PROCEDURE — 99024 POSTOP FOLLOW-UP VISIT: CPT | Performed by: THORACIC SURGERY (CARDIOTHORACIC VASCULAR SURGERY)

## 2019-08-02 PROCEDURE — 25010000002 ENOXAPARIN PER 10 MG: Performed by: THORACIC SURGERY (CARDIOTHORACIC VASCULAR SURGERY)

## 2019-08-02 PROCEDURE — 93010 ELECTROCARDIOGRAM REPORT: CPT | Performed by: INTERNAL MEDICINE

## 2019-08-02 PROCEDURE — 85025 COMPLETE CBC W/AUTO DIFF WBC: CPT | Performed by: THORACIC SURGERY (CARDIOTHORACIC VASCULAR SURGERY)

## 2019-08-02 PROCEDURE — 25010000002 PIPERACILLIN SOD-TAZOBACTAM PER 1 G: Performed by: THORACIC SURGERY (CARDIOTHORACIC VASCULAR SURGERY)

## 2019-08-02 RX ORDER — FUROSEMIDE 10 MG/ML
40 INJECTION INTRAMUSCULAR; INTRAVENOUS ONCE
Status: COMPLETED | OUTPATIENT
Start: 2019-08-02 | End: 2019-08-02

## 2019-08-02 RX ORDER — NICOTINE POLACRILEX 4 MG
15 LOZENGE BUCCAL
Status: DISCONTINUED | OUTPATIENT
Start: 2019-08-02 | End: 2019-08-05 | Stop reason: HOSPADM

## 2019-08-02 RX ORDER — AMIODARONE HYDROCHLORIDE 200 MG/1
400 TABLET ORAL EVERY 12 HOURS SCHEDULED
Status: DISCONTINUED | OUTPATIENT
Start: 2019-08-02 | End: 2019-08-05 | Stop reason: HOSPADM

## 2019-08-02 RX ORDER — CYCLOBENZAPRINE HCL 10 MG
10 TABLET ORAL EVERY 8 HOURS PRN
Status: DISCONTINUED | OUTPATIENT
Start: 2019-08-02 | End: 2019-08-05 | Stop reason: HOSPADM

## 2019-08-02 RX ORDER — DEXTROSE MONOHYDRATE 25 G/50ML
25 INJECTION, SOLUTION INTRAVENOUS
Status: DISCONTINUED | OUTPATIENT
Start: 2019-08-02 | End: 2019-08-05 | Stop reason: HOSPADM

## 2019-08-02 RX ADMIN — METOCLOPRAMIDE 10 MG: 5 INJECTION, SOLUTION INTRAMUSCULAR; INTRAVENOUS at 00:56

## 2019-08-02 RX ADMIN — AMIODARONE HYDROCHLORIDE 400 MG: 200 TABLET ORAL at 09:54

## 2019-08-02 RX ADMIN — FUROSEMIDE 40 MG: 10 INJECTION, SOLUTION INTRAMUSCULAR; INTRAVENOUS at 05:34

## 2019-08-02 RX ADMIN — AMIODARONE HYDROCHLORIDE 0.5 MG/MIN: 1.8 INJECTION, SOLUTION INTRAVENOUS at 08:40

## 2019-08-02 RX ADMIN — ASPIRIN 325 MG: 325 TABLET, COATED ORAL at 08:14

## 2019-08-02 RX ADMIN — CHLORHEXIDINE GLUCONATE 15 ML: 1.2 RINSE ORAL at 18:07

## 2019-08-02 RX ADMIN — OXYCODONE HYDROCHLORIDE 10 MG: 5 TABLET ORAL at 16:43

## 2019-08-02 RX ADMIN — METOPROLOL TARTRATE 12.5 MG: 25 TABLET ORAL at 08:14

## 2019-08-02 RX ADMIN — METOCLOPRAMIDE 10 MG: 5 INJECTION, SOLUTION INTRAMUSCULAR; INTRAVENOUS at 06:29

## 2019-08-02 RX ADMIN — TAZOBACTAM SODIUM AND PIPERACILLIN SODIUM 4.5 G: 500; 4 INJECTION, SOLUTION INTRAVENOUS at 03:17

## 2019-08-02 RX ADMIN — AMIODARONE HYDROCHLORIDE 400 MG: 200 TABLET ORAL at 20:47

## 2019-08-02 RX ADMIN — OXYCODONE HYDROCHLORIDE 10 MG: 5 TABLET ORAL at 20:47

## 2019-08-02 RX ADMIN — SENNOSIDES AND DOCUSATE SODIUM 2 TABLET: 8.6; 5 TABLET ORAL at 20:48

## 2019-08-02 RX ADMIN — CHLORHEXIDINE GLUCONATE 15 ML: 1.2 RINSE ORAL at 05:42

## 2019-08-02 RX ADMIN — ENOXAPARIN SODIUM 40 MG: 40 INJECTION SUBCUTANEOUS at 18:07

## 2019-08-02 RX ADMIN — FUROSEMIDE 40 MG: 10 INJECTION, SOLUTION INTRAMUSCULAR; INTRAVENOUS at 00:15

## 2019-08-02 RX ADMIN — METOPROLOL TARTRATE 12.5 MG: 25 TABLET ORAL at 20:48

## 2019-08-02 RX ADMIN — TAZOBACTAM SODIUM AND PIPERACILLIN SODIUM 4.5 G: 500; 4 INJECTION, SOLUTION INTRAVENOUS at 20:47

## 2019-08-02 RX ADMIN — OXYCODONE HYDROCHLORIDE 10 MG: 5 TABLET ORAL at 12:41

## 2019-08-02 RX ADMIN — CALCIUM GLUCONATE 1 G: 98 INJECTION, SOLUTION INTRAVENOUS at 05:56

## 2019-08-02 RX ADMIN — MUPIROCIN 1 APPLICATION: 20 OINTMENT TOPICAL at 08:42

## 2019-08-02 RX ADMIN — TAZOBACTAM SODIUM AND PIPERACILLIN SODIUM 4.5 G: 500; 4 INJECTION, SOLUTION INTRAVENOUS at 11:10

## 2019-08-02 RX ADMIN — ALPRAZOLAM 0.25 MG: 0.25 TABLET ORAL at 20:50

## 2019-08-02 RX ADMIN — POTASSIUM CHLORIDE 20 MEQ: 29.8 INJECTION, SOLUTION INTRAVENOUS at 05:19

## 2019-08-02 RX ADMIN — ACETAMINOPHEN 650 MG: 325 TABLET, FILM COATED ORAL at 20:47

## 2019-08-02 RX ADMIN — HYDROCODONE BITARTRATE AND ACETAMINOPHEN 2 TABLET: 5; 325 TABLET ORAL at 06:29

## 2019-08-02 RX ADMIN — HYDROCODONE BITARTRATE AND ACETAMINOPHEN 2 TABLET: 5; 325 TABLET ORAL at 02:24

## 2019-08-02 RX ADMIN — MUPIROCIN: 20 OINTMENT TOPICAL at 20:47

## 2019-08-02 RX ADMIN — OXYCODONE HYDROCHLORIDE 10 MG: 5 TABLET ORAL at 08:14

## 2019-08-02 RX ADMIN — PANTOPRAZOLE SODIUM 40 MG: 40 TABLET, DELAYED RELEASE ORAL at 05:42

## 2019-08-02 RX ADMIN — POTASSIUM CHLORIDE 20 MEQ: 29.8 INJECTION, SOLUTION INTRAVENOUS at 01:13

## 2019-08-02 RX ADMIN — CYCLOBENZAPRINE 10 MG: 10 TABLET, FILM COATED ORAL at 11:11

## 2019-08-02 NOTE — ANESTHESIA POSTPROCEDURE EVALUATION
Patient: Marco Singletary    Procedure Summary     Date:  08/01/19 Room / Location:  [facility] OR [address] [place] [address] MAIN OR    Anesthesia Start:  0648 Anesthesia Stop:  1213    Procedure:  ERICA STERNOTOMY CORONARY ARTERY BYPASS GRAFT TIMES 5 USING LEFT INTERNAL MAMMARY ARTERY AND LEFT GREATER SAPHENOUS VEIN GRAFT PER ENDOSCOPIC VEIN HARVESTING AND PRP (N/A Chest) Diagnosis:       Coronary artery disease involving native coronary artery of native heart with unstable angina pectoris (CMS/HCC)      (Coronary artery disease involving native coronary artery of native heart with unstable angina pectoris (CMS/HCC) [I25.110])    Surgeon:  Christiano Zapien MD Provider:  Bishop Mcfarland MD    Anesthesia Type:  general ASA Status:  4          Anesthesia Type: general  Last vitals  BP   103/63 (08/01/19 1900)   Temp   37 °C (98.6 °F) (08/01/19 0333)   Pulse   89 (08/01/19 1905)   Resp   16 (08/01/19 1905)     SpO2   98 % (08/01/19 1905)     Post Anesthesia Care and Evaluation    Patient location during evaluation: bedside  Pain management: adequate  Airway patency: patent  Anesthetic complications: No anesthetic complications    Cardiovascular status: acceptable  Respiratory status: acceptable  Hydration status: acceptable

## 2019-08-02 NOTE — THERAPY EVALUATION
Acute Care - Physical Therapy Initial Evaluation  [facility]     Patient Name: Marco Singletary  : 1965  MRN: [id number]  Today's Date: 2019   Onset of Illness/Injury or Date of Surgery: 19            Admit Date: 2019    Visit Dx:     ICD-10-CM ICD-9-CM   1. Coronary artery disease involving native coronary artery of native heart with unstable angina pectoris (CMS/HCC) I25.110 414.01     411.1   2. Impaired mobility Z74.09 799.89     Patient Active Problem List   Diagnosis   • CAD (coronary artery disease)   • Coronary artery disease involving native coronary artery of native heart with unstable angina pectoris (CMS/HCC)     Past Medical History:   Diagnosis Date   • GERD (gastroesophageal reflux disease)    • Hypertension      Past Surgical History:   Procedure Laterality Date   • HERNIA REPAIR          PT ASSESSMENT (last 12 hours)      Physical Therapy Evaluation     Row Name 19 0923          PT Evaluation Time/Intention    Subjective Information  complains of;pain  -EM     Document Type  evaluation  -EM     Mode of Treatment  physical therapy  -EM     Patient Effort  good  -EM     Row Name 19 0923          General Information    Onset of Illness/Injury or Date of Surgery  19  -EM     Patient Observations  alert;cooperative;agree to therapy  -EM     General Observations of Patient  obese male sitting up in recliner, awake and alert   -EM     Prior Level of Function  independent:;community mobility  -EM     Equipment Currently Used at Home  none  -EM     Pertinent History of Current Functional Problem  CABG x5, MI  -EM     Existing Precautions/Restrictions  cardiac;fall;sternal  -EM     Row Name 19 0923          Relationship/Environment    Lives With  spouse  -EM     Row Name 19 0923          Resource/Environmental Concerns    Current Living Arrangements  home/apartment/condo  -EM     Row Name 19 0923          Living Environment    Home Accessibility   stairs within home  -EM     Row Name 08/02/19 0923          Cognitive Assessment/Intervention- PT/OT    Orientation Status (Cognition)  oriented x 4  -EM     Follows Commands (Cognition)  WNL  -EM     Row Name 08/02/19 0923          Bed Mobility Assessment/Treatment    Comment (Bed Mobility)  not tested, up in chair   -EM     Row Name 08/02/19 0923          Transfer Assessment/Treatment    Transfer Assessment/Treatment  sit-stand transfer;stand-sit transfer  -EM     Sit-Stand [place] (Transfers)  moderate assist (50% patient effort);2 person assist;verbal cues  -EM     Stand-Sit [place] (Transfers)  minimum assist (75% patient effort);2 person assist  -EM     Row Name 08/02/19 0923          Gait/Stairs Assessment/Training    [place] Level (Gait)  moderate assist (50% patient effort);2 person assist  -EM     Assistive Device (Gait)  -- eunice HHA  -EM     Distance in Feet (Gait)  25  -EM     Deviations/Abnormal Patterns (Gait)  stride length decreased;gait speed decreased  -EM     Bilateral Gait Deviations  forward flexed posture  -EM     Comment (Gait/Stairs)  cues to not grunt and to slow breathing, 2 standing rest breaks   -EM     Row Name 08/02/19 0923          General ROM    GENERAL ROM COMMENTS  ROM WNL   -EM     Row Name 08/02/19 0923          MMT (Manual Muscle Testing)    General MMT Comments  no focal deficits identified   -     Row Name 08/02/19 0923          Motor Assessment/Intervention    Additional Documentation  Therapeutic Exercise (Group);Therapeutic Exercise Interventions (Group)  -     Row Name 08/02/19 0923          Therapeutic Exercise    Comment (Therapeutic Exercise)  5 reps cardiac protocol, level 2   -     Row Name 08/02/19 0923          Pain Assessment    Additional Documentation  Pain Scale: Numbers Pre/Post-Treatment (Group)  -     Row Name 08/02/19 0923          Pain Scale: Numbers Pre/Post-Treatment    Pain Scale: Numbers, Pretreatment  3/10  -EM     Pain Location   chest  -EM     Pain Intervention(s)  Medication (See MAR)  -EM     Row Name             Wound 08/01/19 1102 chest incision    Wound - Properties Group Date first assessed: 08/01/19  -SR Time first assessed: 1102  -SR Location: chest  -SR Type: incision  -SR    Row Name             Wound 08/01/19 1102 Left leg incision    Wound - Properties Group Date first assessed: 08/01/19  -SR Time first assessed: 1102  -SR Side: Left  -SR Location: leg  -SR Type: incision  -SR    Row Name 08/02/19 0923          Plan of Care Review    Plan of Care Reviewed With  patient  -EM     Row Name 08/02/19 0923          Physical Therapy Clinical Impression    Patient/Family Goals Statement (PT Clinical Impression)  go home   -EM     Criteria for Skilled Interventions Met (PT Clinical Impression)  yes;treatment indicated  -EM     Impairments Found (describe specific impairments)  gait, locomotion, and balance;aerobic capacity/endurance  -EM     Rehab Potential (PT Clinical Summary)  good, to achieve stated therapy goals  -EM     Row Name 08/02/19 0923          Vital Signs    Pre Systolic BP Rehab  100  -EM     Pre Treatment Diastolic BP  70  -EM     Pretreatment Heart Rate (beats/min)  89  -EM     Posttreatment Heart Rate (beats/min)  89  -EM     Pre SpO2 (%)  93  -EM     O2 Delivery Pre Treatment  supplemental O2  -EM     Post SpO2 (%)  95  -EM     O2 Delivery Post Treatment  supplemental O2  -EM     Row Name 08/02/19 0923          Physical Therapy Goals    Additional Documentation  Cardiac Level Goal Selection (PT) (Group)  -EM     Row Name 08/02/19 0923          Cardiac Level Goal (PT)    Cardiac Level (Cardiac Goal, PT)  Level 5  -EM     Time Frame (Cardiac Goal, PT)  1 week  -EM     Row Name 08/02/19 0923          Positioning and Restraints    Pre-Treatment Position  sitting in chair/recliner  -EM     Post Treatment Position  chair  -EM     In Chair  reclined  -EM       User Key  (r) = Recorded By, (t) = Taken By, (c) = Cosigned By     Initials Name Provider Type    EM Richa Francisco PT Physical Therapist    Mallory Pickering RN Registered Nurse        Physical Therapy Education     Title: PT OT SLP Therapies (In Progress)     Topic: Physical Therapy (In Progress)     Point: Mobility training (In Progress)     Learning Progress Summary           Patient Acceptance, E, NR by EM at 8/2/2019  9:29 AM                   Point: Home exercise program (In Progress)     Learning Progress Summary           Patient Acceptance, E, NR by EM at 8/2/2019  9:29 AM                               User Key     Initials Effective Dates Name Provider Type Discipline    EM 04/03/18 -  Richa Francisco PT Physical Therapist PT              PT Recommendation and Plan  Anticipated Discharge Disposition (PT): home with assist, home with home health  Planned Therapy Interventions (PT Eval): bed mobility training, gait training, home exercise program  Therapy Frequency (PT Clinical Impression): daily  Outcome Summary/Treatment Plan (PT)  Anticipated Discharge Disposition (PT): home with assist, home with home health  Plan of Care Reviewed With: patient  Outcome Summary: Patient is a 54 y.o. male admitted to [facility] for CABGx5, MI on 7/30/2019. PMHx includes HTN. Patient is independent at baseline and lives with his spouse. Patient lives in 2 story home, no use of AD prior to admission. Today, patient required modAx2 for transfers, and ambulated 25 feet with modAx2.  Patient demonstrates impairments consisting of generalized post op weakness and pain, decreased activity tolerance. Patient may benefit from skilled PT services acutely to address functional deficits as well as improve level of independence prior to discharge. Anticipate home with assist and HH upon DC.  Outcome Measures     Row Name 08/02/19 0900             How much help from another person do you currently need...    Turning from your back to your side while in flat bed without using bedrails?  2  -EM       Moving from lying on back to sitting on the side of a flat bed without bedrails?  2  -EM      Moving to and from a bed to a chair (including a wheelchair)?  2  -EM      Standing up from a chair using your arms (e.g., wheelchair, bedside chair)?  2  -EM      Climbing 3-5 steps with a railing?  1  -EM      To walk in hospital room?  2  -EM      AM-PAC 6 Clicks Score (PT)  11  -EM         Functional Assessment    Outcome Measure Options  AM-PAC 6 Clicks Basic Mobility (PT)  -EM        User Key  (r) = Recorded By, (t) = Taken By, (c) = Cosigned By    Initials Name Provider Type    EM Richa Francisco PT Physical Therapist         Time Calculation:   PT Charges     Row Name 08/02/19 0931             Time Calculation    Start Time  0906  -EM      Stop Time  0923  -EM      Time Calculation (min)  17 min  -EM      PT Received On  08/02/19  -EM      PT - Next Appointment  08/03/19  -EM      PT Goal Re-Cert Due Date  08/09/19  -EM         Time Calculation- PT    Total Timed Code Minutes- PT  8 minute(s)  -EM        User Key  (r) = Recorded By, (t) = Taken By, (c) = Cosigned By    Initials Name Provider Type    EM Richa Francisco PT Physical Therapist        Therapy Charges for Today     Code Description Service Date Service Provider Modifiers Qty    76632941176 HC PT EVAL MOD COMPLEXITY 2 8/2/2019 Richa Francisco PT GP 1    56361310436 HC PT THER PROC EA 15 MIN 8/2/2019 Richa Francisco, PT GP 1    26789156472 HC PT THER SUPP EA 15 MIN 8/2/2019 Richa Francisco, PT GP 1          PT G-Codes  Outcome Measure Options: AM-PAC 6 Clicks Basic Mobility (PT)  AM-PAC 6 Clicks Score (PT): 11      Richa Francisco PT  8/2/2019

## 2019-08-02 NOTE — PROGRESS NOTES
Palomo Tao MD                          [phone]      Patient ID:    Name:  Marco Singletary    MRN:  [id number]    1965   54 y.o.  male            Patient Care Team:  Provider, No Known as PCP - General    CC/ Reason for visit: F/u SOA     Subjective: Pt seen and examined this AM.  Patient successfully extubated to nasal cannula and sitting up in the chair.  Complains of some chest discomfort and pain with deep breath.     ROS: No nausea vomiting or diarrhea.  No subjective fevers or chills    Objective     Vital Signs past 24hrs    BP range: BP: ()/(55-82) 109/72  Pulse range: Heart Rate:  [83-94] 90  Resp rate range: Resp:  [14-18] 16  Temp range: Temp (24hrs), Av.9 °F (37.2 °C), Min:98.2 °F (36.8 °C), Max:100.2 °F (37.9 °C)      Ventilator/Non-Invasive Ventilation Settings (From admission, onward)    None          Device (Oxygen Therapy): nasal cannula FiO2 (%): 39 %     124 kg (273 lb 5.9 oz); Body mass index is 37.08 kg/m².      Intake/Output Summary (Last 24 hours) at 2019 1427  Last data filed at 2019 1200  Gross per 24 hour   Intake 4276 ml   Output 2950 ml   Net 1326 ml       PHYSICAL EXAM   Constitutional: Middle aged morbidly obese sitting up in a chair and in no acute distress  Head: - NCAT  Eyes: No pallor, Anicteric conjunctiva, EOMI.  ENMT:   No oral secretions.  Normal external ears and moist mucous membrane   NECK: Trachea midline, No thyromegaly, no palpable cervical lymphadenopathy  Heart: RRR, no murmur. 1+ pedal edema, sternotomy with bandage, he has no chest tube   Lungs: JAIMEE +, decreased breath sounds at the bases, no wheezes/ crackles heard    Abdomen: Soft. No tenderness, guarding or rigidity. No palpable masses  Extremities: Extremities warm and well perfused. No cyanosis/ clubbing.  Groin venous and arterial sheaths.  No palpable hematoma or bruising  Neuro: Awake and interactive with no focal neurological deficit  Psych:  Mood and affect unable to obtain    Scheduled meds:      amiodarone 400 mg Oral Q12H   aspirin 325 mg Oral Daily   atorvastatin 40 mg Oral Nightly   chlorhexidine 15 mL Mouth/Throat Q12H   enoxaparin 40 mg Subcutaneous Q24H   insulin lispro 0-7 Units Subcutaneous 4x Daily With Meals & Nightly   metoprolol tartrate 12.5 mg Oral Q12H   mupirocin  Each Nare BID   pantoprazole 40 mg Oral Q AM   piperacillin-tazobactam 4.5 g Intravenous Q8H   sennosides-docusate sodium 2 tablet Oral Nightly       IV meds:                          propofol 5-50 mcg/kg/min Last Rate: Stopped (08/02/19 0030)       Data Review:      Results from last 7 days   Lab Units 08/02/19  0303 08/02/19  0042 08/01/19  1620 08/01/19  1212  07/30/19  1646   SODIUM mmol/L 142  --  142 133*   < > 137   POTASSIUM mmol/L 4.0 3.7 4.3 4.9   < > 4.4   CHLORIDE mmol/L 106  --  104 99   < > 105   CO2 mmol/L 22.6  --  21.8* 21.6*   < > 21.2*   BUN mg/dL 14  --  13 11   < > 9   CREATININE mg/dL 1.29*  --  1.36* 1.44*   < > 0.86   CALCIUM mg/dL 8.3*  --  8.8 9.1   < > 8.7   BILIRUBIN mg/dL  --   --   --   --   --  0.5   ALK PHOS U/L  --   --   --   --   --  63   ALT (SGPT) U/L  --   --   --   --   --  41   AST (SGOT) U/L  --   --   --   --   --  80*   GLUCOSE mg/dL 132*  --  190* 165*   < > 111*   WBC 10*3/mm3 21.83*  --  24.09* 34.32*   < > 12.15*   HEMOGLOBIN g/dL 10.9*  --  11.4* 11.2*   < > 13.2   HEMOGLOBIN, POC   --   --   --   --    < >  --    PLATELETS 10*3/mm3 198  --  233 225   < > 260   INR  1.27*  --   --  1.37*  --  1.05   PROBNP pg/mL  --   --   --   --   --  152.5    < > = values in this interval not displayed.       Lab Results   Component Value Date    CALCIUM 8.3 (L) 08/02/2019    PHOS 3.7 08/02/2019             Results from last 7 days   Lab Units 08/02/19  0212 08/02/19  0020 08/01/19  1822 08/01/19  1509 08/01/19  1328 08/01/19  1133 08/01/19  1102  07/30/19  1645   PH, ARTERIAL pH units 7.442 7.436 7.405 7.367 7.284* 7.24* 7.27*   < > 7.404    PO2 ART mm Hg 86.5 98.6 86.8 87.3 123.8*  --   --   --  77.6*   PCO2, ARTERIAL mm Hg 31.8* 31.5* 35.9 39.0 43.1  --   --   --  35.9   HCO3 ART mmol/L 21.7* 21.2* 22.5 22.4 20.4*  --   --   --  22.4    < > = values in this interval not displayed.        Results Review:    I have reviewed the relevant laboratory results and reviewed the chest imaging from this hospitalization personally and summarized it below    Assessment    Acute ST elevation MI with 3vd s/p CABG 8/1   Postop hypoxia  Postop hypotension  SUZAN  Hypertension  Leukocytosis with no signs of infection  Undiagnosed obstructive sleep apnea  Morbid obesity    PLAN:  Successfully extubated yesterday to supplemental oxygen and is doing well.  Sitting up in a chair.  States that he does have some chest pain which is limiting his breathing.  Asked him to use his pain medication and continue to work with incentive spirometry  Diuresis per cardiology.  Chest x-ray reviewed showing nonspecific changes with mild atelectasis and some venous congestion.   We will continue to follow the patient closely in the management perioperatively.  Full Code   DVT ppx     I have discussed my findings and recommendations with patient and nursing staff    Palomo Tao MD  8/2/2019

## 2019-08-02 NOTE — PROGRESS NOTES
LOS: 3 days   Patient Care Team:  Provider, No Known as PCP - General    Chief Complaint:   Post-op follow-up, s/p CABG    Subjective  Sitting up in chair. Pain is controlled. Otherwise no new complaints.     Vital Signs  Temp:  [100.2 °F (37.9 °C)] 100.2 °F (37.9 °C)  Heart Rate:  [81-94] 90  Resp:  [14-18] 16  BP: ()/(55-82) 95/81  Arterial Line BP: ()/(56-77) 106/64  FiO2 (%):  [39 %-100 %] 39 %      08/01/19  0437 08/01/19  1215 08/01/19  1226   Weight: 124 kg (272 lb 4.3 oz) 124 kg (273 lb 5.9 oz) 124 kg (273 lb 5.9 oz)     Body mass index is 37.08 kg/m².    Intake/Output Summary (Last 24 hours) at 8/2/2019 0838  Last data filed at 8/2/2019 0800  Gross per 24 hour   Intake 5766 ml   Output 6210 ml   Net -444 ml     I/O this shift:  In: 120 [P.O.:120]  Out: 140 [Urine:110; Chest Tube:30]    Chest tube drainage last 8 hours: 110      Objective    Physical Exam:   General Appearance: awake and alert, no acute distress   Lungs: respirations regular, respirations unlabored and diminished bases   Heart: regular rhythm & normal rate and normal S1, S2   Abdomen: soft or nontender, hypoactive bowel sounds    Skin: sternal incision clean, dry, intact; ace wrap in place    Neuro: alert and oriented, no focal deficits.     Results Review:        WBC WBC   Date Value Ref Range Status   08/02/2019 21.83 (H) 3.40 - 10.80 10*3/mm3 Final   08/01/2019 24.09 (H) 3.40 - 10.80 10*3/mm3 Final   08/01/2019 34.32 (C) 3.40 - 10.80 10*3/mm3 Final   08/01/2019 12.40 (H) 3.40 - 10.80 10*3/mm3 Final   07/31/2019 16.80 (H) 3.40 - 10.80 10*3/mm3 Final   07/30/2019 12.15 (H) 3.40 - 10.80 10*3/mm3 Final      HGB Hemoglobin   Date Value Ref Range Status   08/02/2019 10.9 (L) 13.0 - 17.7 g/dL Final   08/01/2019 11.4 (L) 13.0 - 17.7 g/dL Final   08/01/2019 11.2 (L) 13.0 - 17.7 g/dL Final   08/01/2019 10.9 (L) 12.0 - 17.0 g/dL Final   08/01/2019 11.2 (L) 12.0 - 17.0 g/dL Final   08/01/2019 11.2 (L) 12.0 - 17.0 g/dL Final   08/01/2019  11.2 (L) 12.0 - 17.0 g/dL Final   08/01/2019 10.9 (L) 12.0 - 17.0 g/dL Final   08/01/2019 10.5 (L) 12.0 - 17.0 g/dL Final   08/01/2019 10.9 (L) 12.0 - 17.0 g/dL Final   08/01/2019 12.6 12.0 - 17.0 g/dL Final   08/01/2019 12.1 (L) 13.0 - 17.7 g/dL Final   07/31/2019 12.7 (L) 13.0 - 17.7 g/dL Final   07/30/2019 13.2 13.0 - 17.7 g/dL Final      HCT Hematocrit   Date Value Ref Range Status   08/02/2019 35.0 (L) 37.5 - 51.0 % Final   08/01/2019 36.8 (L) 37.5 - 51.0 % Final   08/01/2019 35.3 (L) 37.5 - 51.0 % Final   08/01/2019 32 (L) 38 - 51 % Final   08/01/2019 33 (L) 38 - 51 % Final   08/01/2019 33 (L) 38 - 51 % Final   08/01/2019 33 (L) 38 - 51 % Final   08/01/2019 32 (L) 38 - 51 % Final   08/01/2019 31 (L) 38 - 51 % Final   08/01/2019 32 (L) 38 - 51 % Final   08/01/2019 37 (L) 38 - 51 % Final   08/01/2019 37.6 37.5 - 51.0 % Final   07/31/2019 40.1 37.5 - 51.0 % Final   07/30/2019 41.6 37.5 - 51.0 % Final      Platelets Platelets   Date Value Ref Range Status   08/02/2019 198 140 - 450 10*3/mm3 Final   08/01/2019 233 140 - 450 10*3/mm3 Final   08/01/2019 225 140 - 450 10*3/mm3 Final   08/01/2019 217 140 - 450 10*3/mm3 Final   07/31/2019 245 140 - 450 10*3/mm3 Final   07/30/2019 260 140 - 450 10*3/mm3 Final        PT/INR:    Protime   Date Value Ref Range Status   08/02/2019 15.6 (H) 11.7 - 14.2 Seconds Final   08/01/2019 16.5 (H) 11.7 - 14.2 Seconds Final   07/30/2019 13.4 11.7 - 14.2 Seconds Final   /  INR   Date Value Ref Range Status   08/02/2019 1.27 (H) 0.90 - 1.10 Final   08/01/2019 1.37 (H) 0.90 - 1.10 Final   07/30/2019 1.05 0.90 - 1.10 Final       Sodium Sodium   Date Value Ref Range Status   08/02/2019 142 136 - 145 mmol/L Final   08/01/2019 142 136 - 145 mmol/L Final   08/01/2019 133 (L) 136 - 145 mmol/L Final   07/31/2019 135 (L) 136 - 145 mmol/L Final   07/30/2019 137 136 - 145 mmol/L Final      Potassium Potassium   Date Value Ref Range Status   08/02/2019 4.0 3.5 - 5.2 mmol/L Final   08/02/2019 3.7 3.5  - 5.2 mmol/L Final   08/01/2019 4.3 3.5 - 5.2 mmol/L Final   08/01/2019 4.9 3.5 - 5.2 mmol/L Final   07/31/2019 4.1 3.5 - 5.2 mmol/L Final   07/30/2019 4.4 3.5 - 5.2 mmol/L Final      Chloride Chloride   Date Value Ref Range Status   08/02/2019 106 98 - 107 mmol/L Final   08/01/2019 104 98 - 107 mmol/L Final   08/01/2019 99 98 - 107 mmol/L Final   07/31/2019 102 98 - 107 mmol/L Final   07/30/2019 105 98 - 107 mmol/L Final      Bicarbonate CO2   Date Value Ref Range Status   08/02/2019 22.6 22.0 - 29.0 mmol/L Final   08/01/2019 21.8 (L) 22.0 - 29.0 mmol/L Final   08/01/2019 21.6 (L) 22.0 - 29.0 mmol/L Final   07/31/2019 20.2 (L) 22.0 - 29.0 mmol/L Final   07/30/2019 21.2 (L) 22.0 - 29.0 mmol/L Final      BUN BUN   Date Value Ref Range Status   08/02/2019 14 6 - 20 mg/dL Final   08/01/2019 13 6 - 20 mg/dL Final   08/01/2019 11 6 - 20 mg/dL Final   07/31/2019 9 6 - 20 mg/dL Final   07/30/2019 9 6 - 20 mg/dL Final      Creatinine Creatinine   Date Value Ref Range Status   08/02/2019 1.29 (H) 0.76 - 1.27 mg/dL Final   08/01/2019 1.36 (H) 0.76 - 1.27 mg/dL Final   08/01/2019 1.44 (H) 0.76 - 1.27 mg/dL Final   07/31/2019 0.82 0.76 - 1.27 mg/dL Final   07/30/2019 0.86 0.76 - 1.27 mg/dL Final      Calcium Calcium   Date Value Ref Range Status   08/02/2019 8.3 (L) 8.6 - 10.5 mg/dL Final   08/01/2019 8.8 8.6 - 10.5 mg/dL Final   08/01/2019 9.1 8.6 - 10.5 mg/dL Final   07/31/2019 8.8 8.6 - 10.5 mg/dL Final   07/30/2019 8.7 8.6 - 10.5 mg/dL Final      Magnesium Magnesium   Date Value Ref Range Status   08/02/2019 2.5 1.6 - 2.6 mg/dL Final   08/01/2019 2.7 (H) 1.6 - 2.6 mg/dL Final   08/01/2019 3.1 (H) 1.6 - 2.6 mg/dL Final   07/30/2019 2.0 1.6 - 2.6 mg/dL Final            aspirin 325 mg Oral Daily   atorvastatin 40 mg Oral Nightly   chlorhexidine 15 mL Mouth/Throat Q12H   enoxaparin 40 mg Subcutaneous Q24H   magnesium sulfate 1 g Intravenous Q8H   metoclopramide 10 mg Intravenous Q6H   metoprolol tartrate 12.5 mg Oral Q12H    mupirocin  Each Nare BID   pantoprazole 40 mg Oral Q AM   piperacillin-tazobactam 4.5 g Intravenous Q8H   sennosides-docusate sodium 2 tablet Oral Nightly       amiodarone 0.5 mg/min Last Rate: 0.5 mg/min (08/01/19 2140)   clevidipine 2-32 mg/hr    dexmedetomidine 0.2-1.5 mcg/kg/hr Last Rate: Stopped (08/02/19 0212)   EPINEPHrine 0.02-0.3 mcg/kg/min Last Rate: Stopped (08/02/19 0621)   insulin 0-50 Units/hr Last Rate: Stopped (08/02/19 0726)   niCARdipine 5-15 mg/hr    nitroglycerin 5-200 mcg/min    norepinephrine 0.02-0.3 mcg/kg/min Last Rate: Stopped (08/02/19 0500)   propofol 5-50 mcg/kg/min Last Rate: Stopped (08/02/19 0030)   sodium chloride 30 mL/hr Last Rate: 30 mL/hr (08/01/19 1335)   sodium chloride 30 mL/hr Last Rate: 30 mL/hr (08/01/19 1300)           Patient Active Problem List   Diagnosis Code   • CAD (coronary artery disease) I25.10   • Coronary artery disease involving native coronary artery of native heart with unstable angina pectoris (CMS/[facility]) I25.110       Assessment & Plan    · Severe 3 vessel CAD, STEMI---s/p urgernt CABGx5 POD#1 (Dr. Zapein)  · HTN-- stable  · HLD-- statin    · Obesity   · Likely undiagnosed sleep apnea   · Leukocytosis--- WBC 21.8, down from 34 immediate post-op, broad spectrum abx started with concern for aspiration after vomiting while on vent     A-line and swan out.  Received IV lasix this AM.  Transition to PO amio.  BB as BP tolerates.   Mobilize and aggressive pulm toilet.   Pulm following.   Transfer to stepdown unit.       Zuhair Barba PA-C  08/02/19  8:38 AM     Doing well.

## 2019-08-02 NOTE — PROGRESS NOTES
"CC: CAD CABG follow up    Interval History:  Required multiple pressors but last turned off at 6am. vomited yesterday while ventilated on zosyn for aspiration, acidosis s/p bicarb pushes much improved now extubated, received IV lasix states he has \"a little\" shortness of breath. He states pain is ok this morning. He is in bed currently      Vital Signs  Heart Rate:  [81-94] 90  Resp:  [14-18] 18  BP: ()/(55-82) 108/64  Arterial Line BP: ()/(56-77) 109/65  FiO2 (%):  [39 %-100 %] 39 %    Intake/Output Summary (Last 24 hours) at 8/2/2019 0722  Last data filed at 8/2/2019 0621  Gross per 24 hour   Intake 5646 ml   Output 5870 ml   Net -224 ml     Flowsheet Rows      First Filed Value   Admission Height  182.9 cm (72\") Documented at 08/01/2019 1215   Admission Weight  126 kg (278 lb) Documented at 07/30/2019 1600          PHYSICAL EXAM:  General: No acute distress  Resp:NL Rate, unlabored, diminished  CV:NL rate and rhythm, NL PMI, Nl S1 and S2, no Murmur, no gallop, no rub, No JVD. Normal pedal pulses  ABD:Nl sounds, no masses or tenderness, nondistended, no guarding or rebound  Neuro: alert,cooperative and oriented  Extr: No edema or cyanosis, moves all extremities      Results Review:    Results from last 7 days   Lab Units 08/02/19  0303   SODIUM mmol/L 142   POTASSIUM mmol/L 4.0   CHLORIDE mmol/L 106   CO2 mmol/L 22.6   BUN mg/dL 14   CREATININE mg/dL 1.29*   GLUCOSE mg/dL 132*   CALCIUM mg/dL 8.3*     Results from last 7 days   Lab Units 07/31/19  0609 07/30/19  2215 07/30/19  1856   TROPONIN T ng/mL 1.830* 1.690* 1.140*     Results from last 7 days   Lab Units 08/02/19  0303   WBC 10*3/mm3 21.83*   HEMOGLOBIN g/dL 10.9*   HEMATOCRIT % 35.0*   PLATELETS 10*3/mm3 198     Results from last 7 days   Lab Units 08/02/19  0303 08/01/19  1212 08/01/19  0436 07/31/19  2110  07/30/19  1646   INR  1.27* 1.37*  --   --   --  1.05   APTT seconds  --  29.0 64.0* 57.1*   < > 41.5*    < > = values in this interval " not displayed.     Results from last 7 days   Lab Units 07/30/19  1646   CHOLESTEROL mg/dL 213*     Results from last 7 days   Lab Units 08/02/19  0303   MAGNESIUM mg/dL 2.5     Results from last 7 days   Lab Units 07/30/19  1646   CHOLESTEROL mg/dL 213*   TRIGLYCERIDES mg/dL 179*   HDL CHOL mg/dL 36*   LDL CHOL mg/dL 141*     I reviewed the patient's new clinical results.  I personally viewed and interpreted the patient's EKG/Telemetry data- NSR        Medication Review:   Meds reviewed      amiodarone 0.5 mg/min Last Rate: 0.5 mg/min (08/01/19 2140)   clevidipine 2-32 mg/hr    dexmedetomidine 0.2-1.5 mcg/kg/hr Last Rate: Stopped (08/02/19 0212)   EPINEPHrine 0.02-0.3 mcg/kg/min Last Rate: Stopped (08/02/19 0621)   insulin 0-50 Units/hr Last Rate: 4.8 Units/hr (08/02/19 0635)   niCARdipine 5-15 mg/hr    nitroglycerin 5-200 mcg/min    norepinephrine 0.02-0.3 mcg/kg/min Last Rate: Stopped (08/02/19 0500)   propofol 5-50 mcg/kg/min Last Rate: Stopped (08/02/19 0030)   sodium chloride 30 mL/hr Last Rate: 30 mL/hr (08/01/19 1335)   sodium chloride 30 mL/hr Last Rate: 30 mL/hr (08/01/19 1300)       Assessment/Plan  1. Acute inferior STEMI, severe three vessel disease  EF 30% s/p CABG x 5 on 8/1/2019 with LIMA to distal LAD, saphenous vein to diagonal, saphenous vein to OM 2, saphenous vein sequential to PDA and left ventricular branch. Intra op ERICA EF 40 %  -doing better  2. nonsustained v tach no recurrence since Epi drip stopped- on IV amiodarone would transition to PO in am  3. DM hgb A1c this admit 6.9  4. SUZAN trending down follow labs  5. Obese BM<I 37.08  6. CHAD- listed in history patient states he's never had a sleep study- will need to arrange outpatient  7.HTN- currently off all pressors  8.HLD LDL this admit 179 Target 70 or less continue target dose atorvastatin 40 mg which is new for patient. He was not on any medication prior to this  9.BIlateral carotid stenosis on duplex this admit    -We will trial low  dose metoprolol 12.5 mg BID, parameters set. Encouraged increased mobility.    KATHY Ojeda  08/02/19  7:22 AM

## 2019-08-02 NOTE — PLAN OF CARE
Problem: Patient Care Overview  Goal: Plan of Care Review  Outcome: Ongoing (interventions implemented as appropriate)   08/02/19 4848   Coping/Psychosocial   Plan of Care Reviewed With patient   Plan of Care Review   Progress improving   OTHER   Outcome Summary Patient transferred from CVR. VSS throughout shift. unable to wean O2 from 4L. CT, pickett, and IJ remain in place. IV antiobiotics continued. will continue to monitor.        Problem: Pain, Chronic (Adult)  Goal: Acceptable Pain/Comfort Level and Functional Ability  Outcome: Ongoing (interventions implemented as appropriate)      Problem: Fall Risk (Adult)  Goal: Absence of Fall  Outcome: Ongoing (interventions implemented as appropriate)      Problem: Cardiac: ACS (Acute Coronary Syndrome) (Adult)  Goal: Signs and Symptoms of Listed Potential Problems Will be Absent, Minimized or Managed (Cardiac: ACS)  Outcome: Ongoing (interventions implemented as appropriate)      Problem: Pain, Acute (Adult)  Goal: Acceptable Pain Control/Comfort Level  Outcome: Ongoing (interventions implemented as appropriate)      Problem: Cardiac Surgery (Adult)  Goal: Signs and Symptoms of Listed Potential Problems Will be Absent, Minimized or Managed (Cardiac Surgery)  Outcome: Ongoing (interventions implemented as appropriate)

## 2019-08-02 NOTE — PLAN OF CARE
Problem: Patient Care Overview  Goal: Plan of Care Review   08/02/19 0929   OTHER   Outcome Summary Patient is a 54 y.[facility]. [facility] MI [facility]/30/2019. PMHx includes HTN. Patient is independent at baseline and lives with his spouse. Patient lives in 2 story home, no use of AD prior to admission. Today, patient required modAx2 for transfers, and ambulated 25 feet with modAx2. Patient demonstrates impairments consisting of generalized post op weakness and pain, decreased activity tolerance. Patient may benefit from skilled PT services acutely to address functional deficits as well as improve level of independence prior to discharge. Anticipate home with assist and HH upon DC.

## 2019-08-03 ENCOUNTER — APPOINTMENT (OUTPATIENT)
Dept: GENERAL RADIOLOGY | Facility: HOSPITAL | Age: 54
End: 2019-08-03

## 2019-08-03 LAB
ABO + RH BLD: NORMAL
ABO + RH BLD: NORMAL
ANION GAP SERPL CALCULATED.3IONS-SCNC: 12.6 MMOL/L (ref 5–15)
BH BB BLOOD EXPIRATION DATE: NORMAL
BH BB BLOOD EXPIRATION DATE: NORMAL
BH BB BLOOD TYPE BARCODE: 5100
BH BB BLOOD TYPE BARCODE: 5100
BH BB DISPENSE STATUS: NORMAL
BH BB DISPENSE STATUS: NORMAL
BH BB PRODUCT CODE: NORMAL
BH BB PRODUCT CODE: NORMAL
BH BB UNIT NUMBER: NORMAL
BH BB UNIT NUMBER: NORMAL
BUN BLD-MCNC: 25 MG/DL (ref 6–20)
BUN/CREAT SERPL: 21 (ref 7–25)
CALCIUM SPEC-SCNC: 8.8 MG/DL (ref 8.6–10.5)
CHLORIDE SERPL-SCNC: 101 MMOL/L (ref 98–107)
CO2 SERPL-SCNC: 23.4 MMOL/L (ref 22–29)
CREAT BLD-MCNC: 1.19 MG/DL (ref 0.76–1.27)
CROSSMATCH INTERPRETATION: NORMAL
CROSSMATCH INTERPRETATION: NORMAL
DEPRECATED RDW RBC AUTO: 50 FL (ref 37–54)
ERYTHROCYTE [DISTWIDTH] IN BLOOD BY AUTOMATED COUNT: 15 % (ref 12.3–15.4)
GFR SERPL CREATININE-BSD FRML MDRD: 64 ML/MIN/1.73
GLUCOSE BLD-MCNC: 172 MG/DL (ref 65–99)
GLUCOSE BLDC GLUCOMTR-MCNC: 131 MG/DL (ref 70–130)
GLUCOSE BLDC GLUCOMTR-MCNC: 177 MG/DL (ref 70–130)
GLUCOSE BLDC GLUCOMTR-MCNC: 179 MG/DL (ref 70–130)
GLUCOSE BLDC GLUCOMTR-MCNC: 179 MG/DL (ref 70–130)
HCT VFR BLD AUTO: 33.1 % (ref 37.5–51)
HGB BLD-MCNC: 10.4 G/DL (ref 13–17.7)
MCH RBC QN AUTO: 28.5 PG (ref 26.6–33)
MCHC RBC AUTO-ENTMCNC: 31.4 G/DL (ref 31.5–35.7)
MCV RBC AUTO: 90.7 FL (ref 79–97)
PLATELET # BLD AUTO: 190 10*3/MM3 (ref 140–450)
PMV BLD AUTO: 11.4 FL (ref 6–12)
POTASSIUM BLD-SCNC: 3.6 MMOL/L (ref 3.5–5.2)
POTASSIUM BLD-SCNC: 4.1 MMOL/L (ref 3.5–5.2)
RBC # BLD AUTO: 3.65 10*6/MM3 (ref 4.14–5.8)
SODIUM BLD-SCNC: 137 MMOL/L (ref 136–145)
UNIT  ABO: NORMAL
UNIT  ABO: NORMAL
UNIT  RH: NORMAL
UNIT  RH: NORMAL
WBC NRBC COR # BLD: 17.78 10*3/MM3 (ref 3.4–10.8)

## 2019-08-03 PROCEDURE — 25010000002 ENOXAPARIN PER 10 MG: Performed by: THORACIC SURGERY (CARDIOTHORACIC VASCULAR SURGERY)

## 2019-08-03 PROCEDURE — 71045 X-RAY EXAM CHEST 1 VIEW: CPT

## 2019-08-03 PROCEDURE — 99232 SBSQ HOSP IP/OBS MODERATE 35: CPT | Performed by: INTERNAL MEDICINE

## 2019-08-03 PROCEDURE — 25010000002 FUROSEMIDE PER 20 MG: Performed by: INTERNAL MEDICINE

## 2019-08-03 PROCEDURE — 25010000002 FUROSEMIDE PER 20 MG: Performed by: NURSE PRACTITIONER

## 2019-08-03 PROCEDURE — 84132 ASSAY OF SERUM POTASSIUM: CPT | Performed by: THORACIC SURGERY (CARDIOTHORACIC VASCULAR SURGERY)

## 2019-08-03 PROCEDURE — 93010 ELECTROCARDIOGRAM REPORT: CPT | Performed by: INTERNAL MEDICINE

## 2019-08-03 PROCEDURE — 97110 THERAPEUTIC EXERCISES: CPT

## 2019-08-03 PROCEDURE — 25010000002 PIPERACILLIN SOD-TAZOBACTAM PER 1 G: Performed by: THORACIC SURGERY (CARDIOTHORACIC VASCULAR SURGERY)

## 2019-08-03 PROCEDURE — 93005 ELECTROCARDIOGRAM TRACING: CPT | Performed by: THORACIC SURGERY (CARDIOTHORACIC VASCULAR SURGERY)

## 2019-08-03 PROCEDURE — 94799 UNLISTED PULMONARY SVC/PX: CPT

## 2019-08-03 PROCEDURE — 80048 BASIC METABOLIC PNL TOTAL CA: CPT | Performed by: THORACIC SURGERY (CARDIOTHORACIC VASCULAR SURGERY)

## 2019-08-03 PROCEDURE — 63710000001 INSULIN LISPRO (HUMAN) PER 5 UNITS: Performed by: PHYSICIAN ASSISTANT

## 2019-08-03 PROCEDURE — 82962 GLUCOSE BLOOD TEST: CPT

## 2019-08-03 PROCEDURE — 85027 COMPLETE CBC AUTOMATED: CPT | Performed by: THORACIC SURGERY (CARDIOTHORACIC VASCULAR SURGERY)

## 2019-08-03 RX ORDER — GUAIFENESIN 600 MG/1
1200 TABLET, EXTENDED RELEASE ORAL EVERY 12 HOURS SCHEDULED
Status: DISCONTINUED | OUTPATIENT
Start: 2019-08-03 | End: 2019-08-05 | Stop reason: HOSPADM

## 2019-08-03 RX ORDER — FUROSEMIDE 10 MG/ML
40 INJECTION INTRAMUSCULAR; INTRAVENOUS ONCE
Status: COMPLETED | OUTPATIENT
Start: 2019-08-03 | End: 2019-08-03

## 2019-08-03 RX ORDER — POTASSIUM CHLORIDE 750 MG/1
20 CAPSULE, EXTENDED RELEASE ORAL ONCE
Status: COMPLETED | OUTPATIENT
Start: 2019-08-03 | End: 2019-08-03

## 2019-08-03 RX ADMIN — GUAIFENESIN 1200 MG: 600 TABLET, EXTENDED RELEASE ORAL at 10:50

## 2019-08-03 RX ADMIN — INSULIN LISPRO 2 UNITS: 100 INJECTION, SOLUTION INTRAVENOUS; SUBCUTANEOUS at 06:52

## 2019-08-03 RX ADMIN — INSULIN LISPRO 2 UNITS: 100 INJECTION, SOLUTION INTRAVENOUS; SUBCUTANEOUS at 12:23

## 2019-08-03 RX ADMIN — METOPROLOL TARTRATE 12.5 MG: 25 TABLET ORAL at 08:35

## 2019-08-03 RX ADMIN — ACETAMINOPHEN 650 MG: 325 TABLET, FILM COATED ORAL at 08:35

## 2019-08-03 RX ADMIN — INSULIN LISPRO 2 UNITS: 100 INJECTION, SOLUTION INTRAVENOUS; SUBCUTANEOUS at 21:00

## 2019-08-03 RX ADMIN — GUAIFENESIN 1200 MG: 600 TABLET, EXTENDED RELEASE ORAL at 20:59

## 2019-08-03 RX ADMIN — ENOXAPARIN SODIUM 40 MG: 40 INJECTION SUBCUTANEOUS at 18:26

## 2019-08-03 RX ADMIN — CYCLOBENZAPRINE 10 MG: 10 TABLET, FILM COATED ORAL at 06:59

## 2019-08-03 RX ADMIN — TAZOBACTAM SODIUM AND PIPERACILLIN SODIUM 4.5 G: 500; 4 INJECTION, SOLUTION INTRAVENOUS at 20:59

## 2019-08-03 RX ADMIN — HYDROCODONE BITARTRATE AND ACETAMINOPHEN 2 TABLET: 5; 325 TABLET ORAL at 12:23

## 2019-08-03 RX ADMIN — FUROSEMIDE 40 MG: 10 INJECTION, SOLUTION INTRAMUSCULAR; INTRAVENOUS at 10:50

## 2019-08-03 RX ADMIN — POTASSIUM CHLORIDE 40 MEQ: 750 CAPSULE, EXTENDED RELEASE ORAL at 06:59

## 2019-08-03 RX ADMIN — PANTOPRAZOLE SODIUM 40 MG: 40 TABLET, DELAYED RELEASE ORAL at 06:51

## 2019-08-03 RX ADMIN — AMIODARONE HYDROCHLORIDE 400 MG: 200 TABLET ORAL at 08:35

## 2019-08-03 RX ADMIN — MUPIROCIN 1 APPLICATION: 20 OINTMENT TOPICAL at 20:59

## 2019-08-03 RX ADMIN — CHLORHEXIDINE GLUCONATE 15 ML: 1.2 RINSE ORAL at 18:26

## 2019-08-03 RX ADMIN — ATORVASTATIN CALCIUM 40 MG: 20 TABLET, FILM COATED ORAL at 20:59

## 2019-08-03 RX ADMIN — FUROSEMIDE 40 MG: 10 INJECTION, SOLUTION INTRAMUSCULAR; INTRAVENOUS at 17:03

## 2019-08-03 RX ADMIN — CHLORHEXIDINE GLUCONATE 15 ML: 1.2 RINSE ORAL at 06:51

## 2019-08-03 RX ADMIN — ATORVASTATIN CALCIUM 40 MG: 20 TABLET, FILM COATED ORAL at 00:42

## 2019-08-03 RX ADMIN — OXYCODONE HYDROCHLORIDE 10 MG: 5 TABLET ORAL at 08:35

## 2019-08-03 RX ADMIN — TAZOBACTAM SODIUM AND PIPERACILLIN SODIUM 4.5 G: 500; 4 INJECTION, SOLUTION INTRAVENOUS at 12:23

## 2019-08-03 RX ADMIN — POTASSIUM CHLORIDE 20 MEQ: 750 CAPSULE, EXTENDED RELEASE ORAL at 10:50

## 2019-08-03 RX ADMIN — SENNOSIDES AND DOCUSATE SODIUM 2 TABLET: 8.6; 5 TABLET ORAL at 20:59

## 2019-08-03 RX ADMIN — ASPIRIN 325 MG: 325 TABLET, COATED ORAL at 08:35

## 2019-08-03 RX ADMIN — AMIODARONE HYDROCHLORIDE 400 MG: 200 TABLET ORAL at 20:59

## 2019-08-03 RX ADMIN — METOPROLOL TARTRATE 12.5 MG: 25 TABLET ORAL at 21:00

## 2019-08-03 RX ADMIN — OXYCODONE HYDROCHLORIDE 10 MG: 5 TABLET ORAL at 03:57

## 2019-08-03 RX ADMIN — MUPIROCIN 1 APPLICATION: 20 OINTMENT TOPICAL at 08:35

## 2019-08-03 RX ADMIN — TAZOBACTAM SODIUM AND PIPERACILLIN SODIUM 4.5 G: 500; 4 INJECTION, SOLUTION INTRAVENOUS at 03:58

## 2019-08-03 NOTE — PROGRESS NOTES
"CC: CAD CABG follow up    Interval History:    Lots of pain this morning, pretty unable to tolerate small movements.  Cannot lean forward without assistance.  Taking shallow breathing of breath and is tachypneic.    Vital Signs  Temp:  [98.1 °F (36.7 °C)-99.3 °F (37.4 °C)] 98.5 °F (36.9 °C)  Heart Rate:  [88-99] 99  Resp:  [16-18] 18  BP: (104-117)/(70-82) 116/82    Intake/Output Summary (Last 24 hours) at 8/3/2019 1042  Last data filed at 8/3/2019 0826  Gross per 24 hour   Intake 920 ml   Output 1125 ml   Net -205 ml     Flowsheet Rows      First Filed Value   Admission Height  182.9 cm (72\") Documented at 08/01/2019 1215   Admission Weight  126 kg (278 lb) Documented at 07/30/2019 1600          PHYSICAL EXAM:  General: No acute distress  Resp: Tachypnea, shallow breathing  CV:NL  mildly tachycardic, regular rhythm, NL PMI, Nl S1 and S2, no Murmur, no gallop, no rub, No JVD. Normal pedal pulses  ABD:Nl sounds, no masses or tenderness, nondistended, no guarding or rebound  Neuro: alert,cooperative and oriented  Extr: No edema or cyanosis, moves all extremities      Results Review:    Results from last 7 days   Lab Units 08/03/19  0349   SODIUM mmol/L 137   POTASSIUM mmol/L 3.6   CHLORIDE mmol/L 101   CO2 mmol/L 23.4   BUN mg/dL 25*   CREATININE mg/dL 1.19   GLUCOSE mg/dL 172*   CALCIUM mg/dL 8.8     Results from last 7 days   Lab Units 07/31/19  0609 07/30/19  2215 07/30/19  1856   TROPONIN T ng/mL 1.830* 1.690* 1.140*     Results from last 7 days   Lab Units 08/03/19  0349   WBC 10*3/mm3 17.78*   HEMOGLOBIN g/dL 10.4*   HEMATOCRIT % 33.1*   PLATELETS 10*3/mm3 190     Results from last 7 days   Lab Units 08/02/19  0303 08/01/19  1212 08/01/19  0436 07/31/19  2110  07/30/19  1646   INR  1.27* 1.37*  --   --   --  1.05   APTT seconds  --  29.0 64.0* 57.1*   < > 41.5*    < > = values in this interval not displayed.     Results from last 7 days   Lab Units 07/30/19  1646   CHOLESTEROL mg/dL 213*     Results from last 7 " days   Lab Units 08/02/19  0303   MAGNESIUM mg/dL 2.5     Results from last 7 days   Lab Units 07/30/19  1646   CHOLESTEROL mg/dL 213*   TRIGLYCERIDES mg/dL 179*   HDL CHOL mg/dL 36*   LDL CHOL mg/dL 141*     I reviewed the patient's new clinical results.  I personally viewed and interpreted the patient's EKG/Telemetry data- NSR        Medication Review:   Meds reviewed      propofol 5-50 mcg/kg/min Last Rate: Stopped (08/02/19 0030)       Assessment/Plan  1. Acute inferior STEMI, severe three vessel disease  EF 30% s/p CABG x 5 on 8/1/2019 with LIMA to distal LAD, saphenous vein to diagonal, saphenous vein to OM 2, saphenous vein sequential to PDA and left ventricular branch.   -Intraoperative ERICA showed moderately reduced systolic function, EF 40%.  Would repeat echocardiogram to assess true function.  -Hemodynamically stable.  Lots of incisional pain which is impeding his respirations.  - Rales, CXR looks overloaded, increase lasix to IV x 2 today  2. nonsustained v tach -monitor, is on 400 twice daily oral amnio for a load  3. DM   4. SUZAN   5. Obese BM<I 37.08  6. CHAD-  7.HTN-low-dose metoprolol 12.5 twice daily  8.hyperlipidemia: Continue high intensity statin 9.BIlateral carotid stenosis on duplex this admit    Repeat echo, plan GDMT based on TTE LV function assessment    Melani Vila MD  08/03/19  10:42 AM

## 2019-08-03 NOTE — CONSULTS
"Met with patient, discussed benefits of cardiac rehab. Provided phase II information packet, which includes; general information about cardiac rehab, [facility] handout and [facility] letter article entitled “Cardiac Rehab is often the Best Medicine for Recovery\", stresses the importance of cardiac rehab after a heart event.   I provided the contact information for cardiac rehab at [facility]  and encouraged him to call when discharged.   Explained if receiving home health would not be able to attend cardiac rehab until finished with home health. Instructed to bring a copy of After Visit Summary to initial assessment.      "

## 2019-08-03 NOTE — PROGRESS NOTES
Yelena Vega MD                          [phone]      Patient ID:    Name:  Marco Singletary    MRN:  [id number]    1965   54 y.o.  male            Patient Care Team:  Provider, No Known as PCP - General    CC/ Reason for visit: F/u respiratory failure    Subjective:   I reviewed the admission note, progress notes, PMH, PSH, Family hx, social history, imagings and prior records on this admission, summarized the finding in my note and formulated a transition of care plan.     On oxygen 3 L/min.  He does not use oxygen at home.  He has chest pain during deep inspiration and cough.  No sputum production. Low grade temp 100.2 last night.     ROS:   Cardiovascular:Sternal chest pain.  Worse with cough and deep breathing.  Legs edema.  No palpitation.    Objective     Vital Signs past 24hrs    BP range: BP: (104-119)/(70-82) 115/75  Pulse range: Heart Rate:  [] 101  Resp rate range: Resp:  [16-18] 16  Temp range: Temp (24hrs), Av.7 °F (37.1 °C), Min:98.1 °F (36.7 °C), Max:99.3 °F (37.4 °C)      Ventilator/Non-Invasive Ventilation Settings (From admission, onward)    None          Device (Oxygen Therapy): nasal cannula FiO2 (%): 39 %     124 kg (273 lb 8 oz); Body mass index is 37.09 kg/m².      Intake/Output Summary (Last 24 hours) at 8/3/2019 1848  Last data filed at 8/3/2019 1817  Gross per 24 hour   Intake 680 ml   Output 1005 ml   Net -325 ml       PHYSICAL EXAM   Constitutional: Middle aged morbidly obese, in no acute distress  Head: - NCAT  Eyes: No pallor, Anicteric conjunctiva, EOMI.  ENMT:   Gorman and Mallampati score 4.  Moist mucous membrane.  NECK: Trachea midline.  Large neck.  No thyromegaly, no palpable cervical lymphadenopathy  Heart: RRR, no murmur. 2+ pedal edema, sternotomy with bandage, he has no chest tube   Lungs: JAIMEE +, decreased breath sounds at the bases, no wheezes.  Bilateral lower lobe crackles  Abdomen: Soft. No tenderness, guarding or  rigidity. No palpable masses  Extremities: Extremities warm and well perfused. No cyanosis/ clubbing.    Neuro: Awake and interactive, alert, oriented x3.  Strength 5/5 in arms.  Psych: Mood and affect unable to obtain    Scheduled meds:      amiodarone 400 mg Oral Q12H   aspirin 325 mg Oral Daily   atorvastatin 40 mg Oral Nightly   chlorhexidine 15 mL Mouth/Throat Q12H   enoxaparin 40 mg Subcutaneous Q24H   guaiFENesin 1,200 mg Oral Q12H   insulin lispro 0-7 Units Subcutaneous 4x Daily With Meals & Nightly   metoprolol tartrate 12.5 mg Oral Q12H   mupirocin  Each Nare BID   pantoprazole 40 mg Oral Q AM   piperacillin-tazobactam 4.5 g Intravenous Q8H   sennosides-docusate sodium 2 tablet Oral Nightly       IV meds:                          propofol 5-50 mcg/kg/min Last Rate: Stopped (08/02/19 0030)       Data Review:      Results from last 7 days   Lab Units 08/03/19  1457 08/03/19  0349 08/02/19  0303  08/01/19  1620 08/01/19  1212  07/30/19  1646   SODIUM mmol/L  --  137 142  --  142 133*   < > 137   POTASSIUM mmol/L 4.1 3.6 4.0   < > 4.3 4.9   < > 4.4   CHLORIDE mmol/L  --  101 106  --  104 99   < > 105   CO2 mmol/L  --  23.4 22.6  --  21.8* 21.6*   < > 21.2*   BUN mg/dL  --  25* 14  --  13 11   < > 9   CREATININE mg/dL  --  1.19 1.29*  --  1.36* 1.44*   < > 0.86   CALCIUM mg/dL  --  8.8 8.3*  --  8.8 9.1   < > 8.7   BILIRUBIN mg/dL  --   --   --   --   --   --   --  0.5   ALK PHOS U/L  --   --   --   --   --   --   --  63   ALT (SGPT) U/L  --   --   --   --   --   --   --  41   AST (SGOT) U/L  --   --   --   --   --   --   --  80*   GLUCOSE mg/dL  --  172* 132*  --  190* 165*   < > 111*   WBC 10*3/mm3  --  17.78* 21.83*  --  24.09* 34.32*   < > 12.15*   HEMOGLOBIN g/dL  --  10.4* 10.9*  --  11.4* 11.2*   < > 13.2   HEMOGLOBIN, POC   --   --   --   --   --   --    < >  --    PLATELETS 10*3/mm3  --  190 198  --  233 225   < > 260   INR   --   --  1.27*  --   --  1.37*  --  1.05   PROBNP pg/mL  --   --   --   --    --   --   --  152.5    < > = values in this interval not displayed.       Lab Results   Component Value Date    CALCIUM 8.8 08/03/2019    PHOS 3.7 08/02/2019             Results from last 7 days   Lab Units 08/02/19  0212 08/02/19  0020 08/01/19  1822 08/01/19  1509 08/01/19  1328 08/01/19  1133 08/01/19  1102  07/30/19  1645   PH, ARTERIAL pH units 7.442 7.436 7.405 7.367 7.284* 7.24* 7.27*   < > 7.404   PO2 ART mm Hg 86.5 98.6 86.8 87.3 123.8*  --   --   --  77.6*   PCO2, ARTERIAL mm Hg 31.8* 31.5* 35.9 39.0 43.1  --   --   --  35.9   HCO3 ART mmol/L 21.7* 21.2* 22.5 22.4 20.4*  --   --   --  22.4    < > = values in this interval not displayed.        Diagnostic imaging:  I personally and Independently reviewed and interpreted the following images:  CXR 8/20/2019 compared to 8/2/2019:  Obesity.  Bilateral pulmonary vascular congestion.  Cardiomegaly.      Assessment    1. Acute ST elevation MI, CAD 3vd s/p CABG 8/1   2. Postop hypoxia: combination of pulmonary edema and atelectasis  3. Pulmonary vascular congestion  4. Postop hypotension, resolved  5. Leukocytosis with no signs of infection  6. Undiagnosed obstructive sleep apnea: Snoring, apnea and excessive daytime sleepiness  7. Morbid obesity    PLAN:  · IS- encouraged to use  · PRN diuresis. Echo  · Outpatient evaluation for CHAD  · Oxygen by SAM Vega MD  8/3/2019

## 2019-08-03 NOTE — PLAN OF CARE
Problem: Skin Injury Risk (Adult)  Goal: Skin Health and Integrity  Outcome: Ongoing (interventions implemented as appropriate)      Problem: Patient Care Overview  Goal: Plan of Care Review  Outcome: Ongoing (interventions implemented as appropriate)    Goal: Individualization and Mutuality  Outcome: Ongoing (interventions implemented as appropriate)    Goal: Discharge Needs Assessment  Outcome: Ongoing (interventions implemented as appropriate)      Problem: Pain, Chronic (Adult)  Goal: Acceptable Pain/Comfort Level and Functional Ability  Outcome: Ongoing (interventions implemented as appropriate)      Problem: Fall Risk (Adult)  Goal: Absence of Fall  Outcome: Ongoing (interventions implemented as appropriate)      Problem: Cardiac: ACS (Acute Coronary Syndrome) (Adult)  Goal: Signs and Symptoms of Listed Potential Problems Will be Absent, Minimized or Managed (Cardiac: ACS)  Outcome: Ongoing (interventions implemented as appropriate)      Problem: Cardiac Surgery (Adult)  Goal: Signs and Symptoms of Listed Potential Problems Will be Absent, Minimized or Managed (Cardiac Surgery)  Outcome: Ongoing (interventions implemented as appropriate)

## 2019-08-03 NOTE — PLAN OF CARE
Problem: Patient Care Overview  Goal: Plan of Care Review  Outcome: Ongoing (interventions implemented as appropriate)   08/03/19 1134   Coping/Psychosocial   Plan of Care Reviewed With patient   OTHER   Outcome Summary Pt walked further this visit, 50'modAx2 and tolerated with mild complaints of pain.

## 2019-08-03 NOTE — THERAPY TREATMENT NOTE
Acute Care - Physical Therapy Treatment Note  [facility]     Patient Name: Marco Singletary  : 1965  MRN: [id number]  Today's Date: 8/3/2019  Onset of Illness/Injury or Date of Surgery: 19          Admit Date: 2019    Visit Dx:    ICD-10-CM ICD-9-CM   1. Coronary artery disease involving native coronary artery of native heart with unstable angina pectoris (CMS/HCC) I25.110 414.01     411.1   2. Impaired mobility Z74.09 799.89     Patient Active Problem List   Diagnosis   • CAD (coronary artery disease)   • Coronary artery disease involving native coronary artery of native heart with unstable angina pectoris (CMS/HCC)       Therapy Treatment    Rehabilitation Treatment Summary     Row Name 19 1136             Treatment Time/Intention    Discipline  physical therapist  -CS      Document Type  therapy note (daily note)  -CS      Subjective Information  complains of;pain  -CS      Mode of Treatment  physical therapy  -CS      Patient/Family Observations  pt up in chair  -CS      Care Plan Review  evaluation/treatment results reviewed  -CS      Therapy Frequency (PT Clinical Impression)  daily  -CS      Patient Effort  good  -CS      Existing Precautions/Restrictions  cardiac;fall;sternal  -CS      Recorded by [CS] Meliton Singh, PT 19 1137      Row Name 19 1136             Vital Signs    O2 Delivery Pre Treatment  supplemental O2  -CS      O2 Delivery Post Treatment  supplemental O2  -CS      Recorded by [CS] Meliton Singh, PT 19 1137      Row Name 19 1136             Cognitive Assessment/Intervention- PT/OT    Orientation Status (Cognition)  oriented x 4  -CS      Follows Commands (Cognition)  WNL  -CS      Recorded by [CS] Meliton Singh, PT 19 1137      Row Name 19 1136             Transfer Assessment/Treatment    Transfer Assessment/Treatment  sit-stand transfer;stand-sit transfer  -CS      Recorded by [CS] Meliton Singh, PT 19 1137       Row Name 08/03/19 1136             Sit-Stand Transfer    Sit-Stand [place] (Transfers)  moderate assist (50% patient effort);2 person assist;verbal cues  -CS      Recorded by [CS] Meliton Singh, PT 08/03/19 1137      Row Name 08/03/19 1136             Stand-Sit Transfer    Stand-Sit [place] (Transfers)  minimum assist (75% patient effort);2 person assist  -CS      Recorded by [CS] Meliton Singh, PT 08/03/19 1137      Row Name 08/03/19 1136             Gait/Stairs Assessment/Training    Weber Level (Gait)  moderate assist (50% patient effort);2 person assist  -CS      Distance in Feet (Gait)  45  -CS      Deviations/Abnormal Patterns (Gait)  stride length decreased;gait speed decreased  -CS      Bilateral Gait Deviations  forward flexed posture  -CS      Recorded by [CS] Meliton Singh, PT 08/03/19 1137      Row Name 08/03/19 1136             Positioning and Restraints    Pre-Treatment Position  sitting in chair/recliner  -CS      Post Treatment Position  chair  -CS      In Chair  reclined;call light within reach;encouraged to call for assist;with family/caregiver  -CS      Recorded by [CS] Meliton Singh, PT 08/03/19 1137      Row Name 08/03/19 1136             Pain Scale: Numbers Pre/Post-Treatment    Pain Scale: Numbers, Pretreatment  3/10  -CS      Pain Location  chest  -CS      Pain Intervention(s)  Ambulation/increased activity;Repositioned  -CS      Recorded by [CS] Meliton Singh, PT 08/03/19 1137      Row Name                Wound 08/01/19 1102 chest incision    Wound - Properties Group Date first assessed: 08/01/19 [SR] Time first assessed: 1102 [SR] Location: chest [SR] Type: incision [SR] Recorded by:  [SR] Mallory Biggs RN 08/01/19 1102    Row Name                Wound 08/01/19 1102 Left leg incision    Wound - Properties Group Date first assessed: 08/01/19 [SR] Time first assessed: 1102 [SR] Side: Left [SR] Location: leg [SR] Type: incision [SR] Recorded by:  [SR] Kalyan  Mallory ROMAN RN 08/01/19 1102    Row Name 08/03/19 1136             Coping    Observed Emotional State  accepting;calm;cooperative  -CS      Verbalized Emotional State  acceptance  -CS      Recorded by [CS] Meliton Singh, PT 08/03/19 1137      Row Name 08/03/19 1136             Plan of Care Review    Plan of Care Reviewed With  patient  -CS      Recorded by [CS] Meliton Singh, PT 08/03/19 1137      Row Name 08/03/19 1136             Outcome Summary/Treatment Plan (PT)    Anticipated Discharge Disposition (PT)  home with assist;home with home health  -CS      Recorded by [CS] Meliton Singh, PT 08/03/19 1137        User Key  (r) = Recorded By, (t) = Taken By, (c) = Cosigned By    Initials Name Effective Dates Discipline    SR RomMallory RN 06/16/16 -  Nurse    Meliton Worthy, PT 05/14/18 -  PT          Wound 08/01/19 1102 chest incision (Active)   Dressing Appearance dry;intact 8/3/2019  8:14 AM   Closure GERA 8/3/2019  8:14 AM   Base dressing in place, unable to visualize 8/3/2019  8:14 AM   Drainage Amount none 8/3/2019  8:14 AM       Wound 08/01/19 1102 Left leg incision (Active)   Dressing Appearance intact;dry 8/3/2019  8:14 AM   Closure Liquid skin adhesive 8/3/2019  8:14 AM   Drainage Amount none 8/3/2019  8:14 AM           Physical Therapy Education     Title: PT OT SLP Therapies (Done)     Topic: Physical Therapy (Done)     Point: Mobility training (Done)     Learning Progress Summary           Patient Acceptance, E,TB, VU,NR by CS at 8/3/2019 11:38 AM    Acceptance, E, NR by EM at 8/2/2019  9:29 AM                   Point: Home exercise program (Done)     Learning Progress Summary           Patient Acceptance, E,TB, VU,NR by CS at 8/3/2019 11:38 AM    Acceptance, E, NR by EM at 8/2/2019  9:29 AM                   Point: Body mechanics (Done)     Learning Progress Summary           Patient Acceptance, E,TB, VU,NR by CS at 8/3/2019 11:38 AM                   Point: Precautions (Done)     Learning  Progress Summary           Patient Acceptance, E,TB, VU,NR by  at 8/3/2019 11:38 AM                               User Key     Initials Effective Dates Name Provider Type Discipline    EM 04/03/18 -  Richa Francisco, PT Physical Therapist PT     05/14/18 -  Meliton Singh, JACINTA Physical Therapist PT                PT Recommendation and Plan  Anticipated Discharge Disposition (PT): home with assist, home with home health  Therapy Frequency (PT Clinical Impression): daily  Outcome Summary/Treatment Plan (PT)  Anticipated Discharge Disposition (PT): home with assist, home with home health  Plan of Care Reviewed With: patient  Outcome Summary: Pt walked further this visit, 50'modAx2 and tolerated with mild complaints of pain.   Outcome Measures     Row Name 08/03/19 1100 08/02/19 0900          How much help from another person do you currently need...    Turning from your back to your side while in flat bed without using bedrails?  2  -CS  2  -EM     Moving from lying on back to sitting on the side of a flat bed without bedrails?  2  -CS  2  -EM     Moving to and from a bed to a chair (including a wheelchair)?  2  -CS  2  -EM     Standing up from a chair using your arms (e.g., wheelchair, bedside chair)?  2  -CS  2  -EM     Climbing 3-5 steps with a railing?  1  -CS  1  -EM     To walk in hospital room?  2  -CS  2  -EM     AM-PAC 6 Clicks Score (PT)  11  -CS  11  -EM        Functional Assessment    Outcome Measure Options  AM-PAC 6 Clicks Basic Mobility (PT)  -CS  AM-PAC 6 Clicks Basic Mobility (PT)  -EM       User Key  (r) = Recorded By, (t) = Taken By, (c) = Cosigned By    Initials Name Provider Type    EM Richa Francisco, PT Physical Therapist    Meliton Worthy, JACINTA Physical Therapist         Time Calculation:   PT Charges     Row Name 08/03/19 1139             Time Calculation    Start Time  1105  -      Stop Time  1122  -      Time Calculation (min)  17 min  -      PT Received On  08/03/19   -KARL      PT - Next Appointment  08/04/19  -KARL        User Key  (r) = Recorded By, (t) = Taken By, (c) = Cosigned By    Initials Name Provider Type    Meliton Worthy, PT Physical Therapist        Therapy Charges for Today     Code Description Service Date Service Provider Modifiers Qty    16787159350  PT THER PROC EA 15 MIN 8/3/2019 Meliton Singh, PT GP 1    98235294255 HC PT THER SUPP EA 15 MIN 8/3/2019 Meliton Singh, PT GP 1          PT G-Codes  Outcome Measure Options: AM-PAC 6 Clicks Basic Mobility (PT)  AM-PAC 6 Clicks Score (PT): 11    Meliton Singh PT  8/3/2019

## 2019-08-03 NOTE — PROGRESS NOTES
· Severe 3 vessel CAD, STEMI---s/p urgernt CABGx5 POD#2(Dr. Zapien)  · HTN-- stable  · HLD-- statin    · Obesity   · Likely undiagnosed sleep apnea   · Leukocytosis--- WBC 21.8, down from 34 immediate post-op, broad spectrum abx started with concern for aspiration after vomiting while on vent        Mobilize and aggressive pulm toilet.   Pulm following.   IV diurese.  Reluctant to take deep breaths.  Discontinue pickett, central line, and chest tubes  Add mucinex and flutter valve.        Khadra Velasco, KATHY  08/03/19  9:22 AM

## 2019-08-04 ENCOUNTER — APPOINTMENT (OUTPATIENT)
Dept: CARDIOLOGY | Facility: HOSPITAL | Age: 54
End: 2019-08-04

## 2019-08-04 ENCOUNTER — APPOINTMENT (OUTPATIENT)
Dept: GENERAL RADIOLOGY | Facility: HOSPITAL | Age: 54
End: 2019-08-04

## 2019-08-04 LAB
ANION GAP SERPL CALCULATED.3IONS-SCNC: 14.8 MMOL/L (ref 5–15)
AORTIC DIMENSIONLESS INDEX: 0.5 (DI)
BH CV ECHO MEAS - AO MAX PG (FULL): 5.6 MMHG
BH CV ECHO MEAS - AO MAX PG: 8.6 MMHG
BH CV ECHO MEAS - AO MEAN PG (FULL): 3 MMHG
BH CV ECHO MEAS - AO MEAN PG: 5 MMHG
BH CV ECHO MEAS - AO ROOT AREA (BSA CORRECTED): 1.6
BH CV ECHO MEAS - AO ROOT AREA: 11.9 CM^2
BH CV ECHO MEAS - AO ROOT DIAM: 3.9 CM
BH CV ECHO MEAS - AO V2 MAX: 147 CM/SEC
BH CV ECHO MEAS - AO V2 MEAN: 103 CM/SEC
BH CV ECHO MEAS - AO V2 VTI: 22.1 CM
BH CV ECHO MEAS - AVA(I,A): 2.2 CM^2
BH CV ECHO MEAS - AVA(I,D): 2.2 CM^2
BH CV ECHO MEAS - AVA(V,A): 2.5 CM^2
BH CV ECHO MEAS - AVA(V,D): 2.5 CM^2
BH CV ECHO MEAS - BSA(HAYCOCK): 2.6 M^2
BH CV ECHO MEAS - BSA: 2.4 M^2
BH CV ECHO MEAS - BZI_BMI: 37.4 KILOGRAMS/M^2
BH CV ECHO MEAS - BZI_METRIC_HEIGHT: 182 CM
BH CV ECHO MEAS - BZI_METRIC_WEIGHT: 124 KG
BH CV ECHO MEAS - EDV(CUBED): 185.2 ML
BH CV ECHO MEAS - EDV(MOD-SP4): 111 ML
BH CV ECHO MEAS - EDV(TEICH): 160 ML
BH CV ECHO MEAS - EF(CUBED): 50.8 %
BH CV ECHO MEAS - EF(MOD-BP): 43 %
BH CV ECHO MEAS - EF(MOD-SP4): 43.2 %
BH CV ECHO MEAS - EF(TEICH): 42.2 %
BH CV ECHO MEAS - ESV(CUBED): 91.1 ML
BH CV ECHO MEAS - ESV(MOD-SP4): 63 ML
BH CV ECHO MEAS - ESV(TEICH): 92.4 ML
BH CV ECHO MEAS - FS: 21.1 %
BH CV ECHO MEAS - IVS/LVPW: 1
BH CV ECHO MEAS - IVSD: 1.2 CM
BH CV ECHO MEAS - LAT PEAK E' VEL: 7.6 CM/SEC
BH CV ECHO MEAS - LV DIASTOLIC VOL/BSA (35-75): 45.8 ML/M^2
BH CV ECHO MEAS - LV MASS(C)D: 288.7 GRAMS
BH CV ECHO MEAS - LV MASS(C)DI: 119.1 GRAMS/M^2
BH CV ECHO MEAS - LV MAX PG: 3 MMHG
BH CV ECHO MEAS - LV MEAN PG: 2 MMHG
BH CV ECHO MEAS - LV SYSTOLIC VOL/BSA (12-30): 26 ML/M^2
BH CV ECHO MEAS - LV V1 MAX: 86.8 CM/SEC
BH CV ECHO MEAS - LV V1 MEAN: 58.3 CM/SEC
BH CV ECHO MEAS - LV V1 VTI: 11.6 CM
BH CV ECHO MEAS - LVIDD: 5.7 CM
BH CV ECHO MEAS - LVIDS: 4.5 CM
BH CV ECHO MEAS - LVLD AP4: 7.9 CM
BH CV ECHO MEAS - LVLS AP4: 7.1 CM
BH CV ECHO MEAS - LVOT AREA (M): 4.2 CM^2
BH CV ECHO MEAS - LVOT AREA: 4.2 CM^2
BH CV ECHO MEAS - LVOT DIAM: 2.3 CM
BH CV ECHO MEAS - LVPWD: 1.2 CM
BH CV ECHO MEAS - MED PEAK E' VEL: 5.98 CM/SEC
BH CV ECHO MEAS - MV A DUR: 127 SEC
BH CV ECHO MEAS - MV A MAX VEL: 53 CM/SEC
BH CV ECHO MEAS - MV DEC SLOPE: 600 CM/SEC^2
BH CV ECHO MEAS - MV DEC TIME: 132 SEC
BH CV ECHO MEAS - MV E MAX VEL: 79.2 CM/SEC
BH CV ECHO MEAS - MV E/A: 1.5
BH CV ECHO MEAS - PA ACC TIME: 0.07 SEC
BH CV ECHO MEAS - PA PR(ACCEL): 45.7 MMHG
BH CV ECHO MEAS - PULM A REVS DUR: 0.08 SEC
BH CV ECHO MEAS - PULM A REVS VEL: 20.9 CM/SEC
BH CV ECHO MEAS - PULM DIAS VEL: 52.1 CM/SEC
BH CV ECHO MEAS - PULM S/D: 0.74
BH CV ECHO MEAS - PULM SYS VEL: 38.7 CM/SEC
BH CV ECHO MEAS - QP/QS: 1.9
BH CV ECHO MEAS - RAP SYSTOLE: 8 MMHG
BH CV ECHO MEAS - RV MAX PG: 1.9 MMHG
BH CV ECHO MEAS - RV MEAN PG: 1 MMHG
BH CV ECHO MEAS - RV V1 MAX: 69.5 CM/SEC
BH CV ECHO MEAS - RV V1 MEAN: 42.9 CM/SEC
BH CV ECHO MEAS - RV V1 VTI: 12 CM
BH CV ECHO MEAS - RVOT AREA: 7.5 CM^2
BH CV ECHO MEAS - RVOT DIAM: 3.1 CM
BH CV ECHO MEAS - RVSP: 41 MMHG
BH CV ECHO MEAS - SI(AO): 108.9 ML/M^2
BH CV ECHO MEAS - SI(CUBED): 38.8 ML/M^2
BH CV ECHO MEAS - SI(LVOT): 19.9 ML/M^2
BH CV ECHO MEAS - SI(MOD-SP4): 19.8 ML/M^2
BH CV ECHO MEAS - SI(TEICH): 27.9 ML/M^2
BH CV ECHO MEAS - SV(AO): 264 ML
BH CV ECHO MEAS - SV(CUBED): 94.1 ML
BH CV ECHO MEAS - SV(LVOT): 48.2 ML
BH CV ECHO MEAS - SV(MOD-SP4): 48 ML
BH CV ECHO MEAS - SV(RVOT): 90.6 ML
BH CV ECHO MEAS - SV(TEICH): 67.6 ML
BH CV ECHO MEAS - TR MAX PG: 33
BH CV ECHO MEAS - TR MAX VEL: 317 CM/SEC
BH CV ECHO MEASUREMENTS AVERAGE E/E' RATIO: 11.66
BH CV XLRA - RV BASE: 4.4 CM
BH CV XLRA - TDI S': 11.9 CM/SEC
BUN BLD-MCNC: 33 MG/DL (ref 6–20)
BUN/CREAT SERPL: 31.4 (ref 7–25)
CALCIUM SPEC-SCNC: 8.4 MG/DL (ref 8.6–10.5)
CHLORIDE SERPL-SCNC: 103 MMOL/L (ref 98–107)
CO2 SERPL-SCNC: 22.2 MMOL/L (ref 22–29)
CREAT BLD-MCNC: 1.05 MG/DL (ref 0.76–1.27)
DEPRECATED RDW RBC AUTO: 49.7 FL (ref 37–54)
ERYTHROCYTE [DISTWIDTH] IN BLOOD BY AUTOMATED COUNT: 15 % (ref 12.3–15.4)
GFR SERPL CREATININE-BSD FRML MDRD: 74 ML/MIN/1.73
GLUCOSE BLD-MCNC: 153 MG/DL (ref 65–99)
GLUCOSE BLDC GLUCOMTR-MCNC: 114 MG/DL (ref 70–130)
GLUCOSE BLDC GLUCOMTR-MCNC: 140 MG/DL (ref 70–130)
GLUCOSE BLDC GLUCOMTR-MCNC: 170 MG/DL (ref 70–130)
GLUCOSE BLDC GLUCOMTR-MCNC: 211 MG/DL (ref 70–130)
HCT VFR BLD AUTO: 33.4 % (ref 37.5–51)
HGB BLD-MCNC: 10.2 G/DL (ref 13–17.7)
LEFT ATRIUM VOLUME INDEX: 32 ML/M2
MAXIMAL PREDICTED HEART RATE: 166 BPM
MCH RBC QN AUTO: 27.7 PG (ref 26.6–33)
MCHC RBC AUTO-ENTMCNC: 30.5 G/DL (ref 31.5–35.7)
MCV RBC AUTO: 90.8 FL (ref 79–97)
PLATELET # BLD AUTO: 245 10*3/MM3 (ref 140–450)
PMV BLD AUTO: 10.5 FL (ref 6–12)
POTASSIUM BLD-SCNC: 3.5 MMOL/L (ref 3.5–5.2)
POTASSIUM BLD-SCNC: 3.6 MMOL/L (ref 3.5–5.2)
RBC # BLD AUTO: 3.68 10*6/MM3 (ref 4.14–5.8)
SODIUM BLD-SCNC: 140 MMOL/L (ref 136–145)
STRESS TARGET HR: 141 BPM
WBC NRBC COR # BLD: 15.43 10*3/MM3 (ref 3.4–10.8)

## 2019-08-04 PROCEDURE — 25010000002 FUROSEMIDE PER 20 MG: Performed by: NURSE PRACTITIONER

## 2019-08-04 PROCEDURE — 63710000001 INSULIN LISPRO (HUMAN) PER 5 UNITS: Performed by: PHYSICIAN ASSISTANT

## 2019-08-04 PROCEDURE — 85027 COMPLETE CBC AUTOMATED: CPT | Performed by: THORACIC SURGERY (CARDIOTHORACIC VASCULAR SURGERY)

## 2019-08-04 PROCEDURE — 94799 UNLISTED PULMONARY SVC/PX: CPT | Performed by: NURSE PRACTITIONER

## 2019-08-04 PROCEDURE — 84132 ASSAY OF SERUM POTASSIUM: CPT | Performed by: THORACIC SURGERY (CARDIOTHORACIC VASCULAR SURGERY)

## 2019-08-04 PROCEDURE — 71046 X-RAY EXAM CHEST 2 VIEWS: CPT

## 2019-08-04 PROCEDURE — 25010000002 ENOXAPARIN PER 10 MG: Performed by: THORACIC SURGERY (CARDIOTHORACIC VASCULAR SURGERY)

## 2019-08-04 PROCEDURE — 25010000002 PIPERACILLIN SOD-TAZOBACTAM PER 1 G: Performed by: THORACIC SURGERY (CARDIOTHORACIC VASCULAR SURGERY)

## 2019-08-04 PROCEDURE — 93306 TTE W/DOPPLER COMPLETE: CPT

## 2019-08-04 PROCEDURE — 80048 BASIC METABOLIC PNL TOTAL CA: CPT | Performed by: THORACIC SURGERY (CARDIOTHORACIC VASCULAR SURGERY)

## 2019-08-04 PROCEDURE — 82962 GLUCOSE BLOOD TEST: CPT

## 2019-08-04 PROCEDURE — 97110 THERAPEUTIC EXERCISES: CPT

## 2019-08-04 PROCEDURE — 93306 TTE W/DOPPLER COMPLETE: CPT | Performed by: INTERNAL MEDICINE

## 2019-08-04 RX ORDER — FUROSEMIDE 10 MG/ML
40 INJECTION INTRAMUSCULAR; INTRAVENOUS ONCE
Status: COMPLETED | OUTPATIENT
Start: 2019-08-04 | End: 2019-08-04

## 2019-08-04 RX ORDER — POTASSIUM CHLORIDE 750 MG/1
20 CAPSULE, EXTENDED RELEASE ORAL ONCE
Status: COMPLETED | OUTPATIENT
Start: 2019-08-04 | End: 2019-08-04

## 2019-08-04 RX ADMIN — TAZOBACTAM SODIUM AND PIPERACILLIN SODIUM 4.5 G: 500; 4 INJECTION, SOLUTION INTRAVENOUS at 12:05

## 2019-08-04 RX ADMIN — HYDROCODONE BITARTRATE AND ACETAMINOPHEN 2 TABLET: 5; 325 TABLET ORAL at 22:06

## 2019-08-04 RX ADMIN — POTASSIUM CHLORIDE 20 MEQ: 750 CAPSULE, EXTENDED RELEASE ORAL at 10:59

## 2019-08-04 RX ADMIN — AMIODARONE HYDROCHLORIDE 400 MG: 200 TABLET ORAL at 20:58

## 2019-08-04 RX ADMIN — HYDROCODONE BITARTRATE AND ACETAMINOPHEN 2 TABLET: 5; 325 TABLET ORAL at 10:59

## 2019-08-04 RX ADMIN — METOPROLOL TARTRATE 12.5 MG: 25 TABLET ORAL at 11:00

## 2019-08-04 RX ADMIN — ATORVASTATIN CALCIUM 40 MG: 20 TABLET, FILM COATED ORAL at 20:58

## 2019-08-04 RX ADMIN — MUPIROCIN 1 APPLICATION: 20 OINTMENT TOPICAL at 20:58

## 2019-08-04 RX ADMIN — TAZOBACTAM SODIUM AND PIPERACILLIN SODIUM 4.5 G: 500; 4 INJECTION, SOLUTION INTRAVENOUS at 20:57

## 2019-08-04 RX ADMIN — GUAIFENESIN 1200 MG: 600 TABLET, EXTENDED RELEASE ORAL at 10:59

## 2019-08-04 RX ADMIN — GUAIFENESIN 1200 MG: 600 TABLET, EXTENDED RELEASE ORAL at 20:58

## 2019-08-04 RX ADMIN — INSULIN LISPRO 2 UNITS: 100 INJECTION, SOLUTION INTRAVENOUS; SUBCUTANEOUS at 12:05

## 2019-08-04 RX ADMIN — METOPROLOL TARTRATE 12.5 MG: 25 TABLET ORAL at 20:57

## 2019-08-04 RX ADMIN — POTASSIUM CHLORIDE 40 MEQ: 750 CAPSULE, EXTENDED RELEASE ORAL at 17:06

## 2019-08-04 RX ADMIN — TAZOBACTAM SODIUM AND PIPERACILLIN SODIUM 4.5 G: 500; 4 INJECTION, SOLUTION INTRAVENOUS at 05:37

## 2019-08-04 RX ADMIN — ASPIRIN 325 MG: 325 TABLET, COATED ORAL at 11:00

## 2019-08-04 RX ADMIN — POTASSIUM CHLORIDE 40 MEQ: 750 CAPSULE, EXTENDED RELEASE ORAL at 07:06

## 2019-08-04 RX ADMIN — PANTOPRAZOLE SODIUM 40 MG: 40 TABLET, DELAYED RELEASE ORAL at 05:36

## 2019-08-04 RX ADMIN — MUPIROCIN 1 APPLICATION: 20 OINTMENT TOPICAL at 11:00

## 2019-08-04 RX ADMIN — FUROSEMIDE 40 MG: 10 INJECTION, SOLUTION INTRAMUSCULAR; INTRAVENOUS at 12:05

## 2019-08-04 RX ADMIN — INSULIN LISPRO 3 UNITS: 100 INJECTION, SOLUTION INTRAVENOUS; SUBCUTANEOUS at 17:06

## 2019-08-04 RX ADMIN — HYDROCODONE BITARTRATE AND ACETAMINOPHEN 2 TABLET: 5; 325 TABLET ORAL at 05:37

## 2019-08-04 RX ADMIN — AMIODARONE HYDROCHLORIDE 400 MG: 200 TABLET ORAL at 10:59

## 2019-08-04 RX ADMIN — ENOXAPARIN SODIUM 40 MG: 40 INJECTION SUBCUTANEOUS at 17:06

## 2019-08-04 NOTE — PROGRESS NOTES
"CC: CAD CABG follow up    Interval History:    Pt off the floor. Wife states he looks and feels much better, is able to breathe.     Vital Signs  Temp:  [98.4 °F (36.9 °C)-98.6 °F (37 °C)] 98.6 °F (37 °C)  Heart Rate:  [] 96  Resp:  [16-18] 17  BP: (104-121)/(67-84) 109/67    Intake/Output Summary (Last 24 hours) at 8/4/2019 1046  Last data filed at 8/4/2019 0920  Gross per 24 hour   Intake 760 ml   Output 920 ml   Net -160 ml     Flowsheet Rows      First Filed Value   Admission Height  182.9 cm (72\") Documented at 08/01/2019 1215   Admission Weight  126 kg (278 lb) Documented at 07/30/2019 1600            Results Review:    Results from last 7 days   Lab Units 08/04/19  0351   SODIUM mmol/L 140   POTASSIUM mmol/L 3.5   CHLORIDE mmol/L 103   CO2 mmol/L 22.2   BUN mg/dL 33*   CREATININE mg/dL 1.05   GLUCOSE mg/dL 153*   CALCIUM mg/dL 8.4*     Results from last 7 days   Lab Units 07/31/19  0609 07/30/19  2215 07/30/19  1856   TROPONIN T ng/mL 1.830* 1.690* 1.140*     Results from last 7 days   Lab Units 08/04/19  0351   WBC 10*3/mm3 15.43*   HEMOGLOBIN g/dL 10.2*   HEMATOCRIT % 33.4*   PLATELETS 10*3/mm3 245     Results from last 7 days   Lab Units 08/02/19  0303 08/01/19  1212 08/01/19  0436 07/31/19  2110  07/30/19  1646   INR  1.27* 1.37*  --   --   --  1.05   APTT seconds  --  29.0 64.0* 57.1*   < > 41.5*    < > = values in this interval not displayed.     Results from last 7 days   Lab Units 07/30/19  1646   CHOLESTEROL mg/dL 213*     Results from last 7 days   Lab Units 08/02/19  0303   MAGNESIUM mg/dL 2.5     Results from last 7 days   Lab Units 07/30/19  1646   CHOLESTEROL mg/dL 213*   TRIGLYCERIDES mg/dL 179*   HDL CHOL mg/dL 36*   LDL CHOL mg/dL 141*     I reviewed the patient's new clinical results.  I personally viewed and interpreted the patient's EKG/Telemetry data- NSR        Medication Review:   Meds reviewed      propofol 5-50 mcg/kg/min Last Rate: Stopped (08/02/19 0030) "       Assessment/Plan  1. Acute inferior STEMI, severe three vessel disease  EF 30% s/p CABG x 5 on 8/1/2019 with LIMA to distal LAD, saphenous vein to diagonal, saphenous vein to OM 2, saphenous vein sequential to PDA and left ventricular branch.   -TTE pending    2. nonsustained v tach -monitor, is on 400 twice daily oral amnio for a load  3. DM   4. SUZAN   5. Obese BM<I 37.08  6. CHAD-  7.HTN-low-dose metoprolol 12.5 twice daily  8.hyperlipidemia: Continue high intensity statin 9.BIlateral carotid stenosis on duplex this admit    Unable to assess patient today as he is off the floor. Reportedly, he is feeling better. VSS. No changes. Echo pending for systolic function.     Melani Vila MD  08/04/19  10:46 AM

## 2019-08-04 NOTE — THERAPY TREATMENT NOTE
Acute Care - Physical Therapy Treatment Note  [facility]     Patient Name: Marco Singletary  : 1965  MRN: [id number]  Today's Date: 2019  Onset of Illness/Injury or Date of Surgery: 19          Admit Date: 2019    Visit Dx:    ICD-10-CM ICD-9-CM   1. Coronary artery disease involving native coronary artery of native heart with unstable angina pectoris (CMS/HCC) I25.110 414.01     411.1   2. Impaired mobility Z74.09 799.89     Patient Active Problem List   Diagnosis   • CAD (coronary artery disease)   • Coronary artery disease involving native coronary artery of native heart with unstable angina pectoris (CMS/HCC)       Therapy Treatment    Rehabilitation Treatment Summary     Row Name 19 1121             Treatment Time/Intention    Discipline  physical therapist  -CS      Document Type  therapy note (daily note)  -CS      Subjective Information  complains of;pain  -CS      Mode of Treatment  physical therapy  -CS      Patient/Family Observations  pt in BR.   -CS      Care Plan Review  care plan/treatment goals reviewed  -CS      Therapy Frequency (PT Clinical Impression)  daily  -CS      Patient Effort  good  -CS      Existing Precautions/Restrictions  cardiac;fall;sternal  -CS      Recorded by [CS] Meliton Singh, PT 19 1123      Row Name 19 1121             Vital Signs    O2 Delivery Pre Treatment  supplemental O2  -CS      O2 Delivery Intra Treatment  supplemental O2  -CS      O2 Delivery Post Treatment  supplemental O2  -CS      Recorded by [CS] Meliton Singh, PT 19 1123      Row Name 19 1121             Cognitive Assessment/Intervention- PT/OT    Orientation Status (Cognition)  oriented x 4  -CS      Follows Commands (Cognition)  WNL  -CS      Recorded by [CS] Meliton Singh, PT 19 1123      Row Name 19 1121             Transfer Assessment/Treatment    Transfer Assessment/Treatment  sit-stand transfer;stand-sit transfer  -CS       Recorded by [CS] Meliton Singh, PT 08/04/19 1123      Row Name 08/04/19 1121             Sit-Stand Transfer    Sit-Stand [place] (Transfers)  minimum assist (75% patient effort);contact guard  -CS      Recorded by [CS] Meliton Singh, PT 08/04/19 1123      Row Name 08/04/19 1121             Stand-Sit Transfer    Stand-Sit [place] (Transfers)  contact guard  -CS      Recorded by [CS] Meliton Singh, PT 08/04/19 1123      Row Name 08/04/19 1121             Gait/Stairs Assessment/Training    [place] Level (Gait)  2 person assist;contact guard;minimum assist (75% patient effort)  -CS      Distance in Feet (Gait)  120  -CS      Deviations/Abnormal Patterns (Gait)  stride length decreased;gait speed decreased  -CS      Bilateral Gait Deviations  forward flexed posture  -CS      Recorded by [CS] Meliton Singh, PT 08/04/19 1123      Row Name 08/04/19 1121             Positioning and Restraints    Pre-Treatment Position  bathroom  -CS      Post Treatment Position  bed  -CS      In Bed  supine;call light within reach;encouraged to call for assist;with family/caregiver  -CS      Recorded by [CS] Meliton Singh, PT 08/04/19 1123      Row Name 08/04/19 1121             Pain Scale: Numbers Pre/Post-Treatment    Pain Scale: Numbers, Pretreatment  3/10  -CS      Pain Location  chest  -CS      Pain Intervention(s)  Repositioned;Ambulation/increased activity  -CS      Recorded by [CS] Meliton Singh, PT 08/04/19 1123      Row Name                Wound 08/01/19 1102 chest incision    Wound - Properties Group Date first assessed: 08/01/19 [SR] Time first assessed: 1102 [SR] Location: chest [SR] Type: incision [SR] Recorded by:  [SR] Mallory Biggs RN 08/01/19 1102    Row Name                Wound 08/01/19 1102 Left leg incision    Wound - Properties Group Date first assessed: 08/01/19 [SR] Time first assessed: 1102 [SR] Side: Left [SR] Location: leg [SR] Type: incision [SR] Recorded by:  [SR] Mallory Biggs  RN 08/01/19 1102    Row Name 08/04/19 1121             Coping    Observed Emotional State  accepting;calm;cooperative  -CS      Verbalized Emotional State  acceptance  -CS      Recorded by [CS] Meliton Singh, PT 08/04/19 1123      Row Name 08/04/19 1121             Plan of Care Review    Plan of Care Reviewed With  patient  -CS      Recorded by [CS] Meliton Singh, PT 08/04/19 1123      Row Name 08/04/19 1121             Outcome Summary/Treatment Plan (PT)    Anticipated Discharge Disposition (PT)  home with assist;home with home health  -CS      Recorded by [CS] Meliton Singh, PT 08/04/19 1123        User Key  (r) = Recorded By, (t) = Taken By, (c) = Cosigned By    Initials Name Effective Dates Discipline    SR Mallory Biggs RN 06/16/16 -  Nurse    Meliton Worthy, PT 05/14/18 -  PT          Wound 08/01/19 1102 chest incision (Active)   Dressing Appearance dry;intact 8/4/2019  8:09 AM   Closure GERA 8/4/2019  8:09 AM   Base dressing in place, unable to visualize 8/4/2019  8:09 AM   Drainage Amount none 8/4/2019  8:09 AM       Wound 08/01/19 1102 Left leg incision (Active)   Dressing Appearance intact;dry 8/4/2019  8:09 AM   Closure Liquid skin adhesive 8/4/2019  8:09 AM   Drainage Amount none 8/4/2019  8:09 AM           Physical Therapy Education     Title: PT OT SLP Therapies (Done)     Topic: Physical Therapy (Done)     Point: Mobility training (Done)     Learning Progress Summary           Patient Acceptance, E,TB, VU,NR by CS at 8/4/2019 11:23 AM    Acceptance, E,TB, VU,NR by CS at 8/3/2019 11:38 AM    Acceptance, E, NR by EM at 8/2/2019  9:29 AM                   Point: Home exercise program (Done)     Learning Progress Summary           Patient Acceptance, E,TB, VU,NR by CS at 8/4/2019 11:23 AM    Acceptance, E,TB, VU,NR by CS at 8/3/2019 11:38 AM    Acceptance, E, NR by EM at 8/2/2019  9:29 AM                   Point: Body mechanics (Done)     Learning Progress Summary           Patient  Acceptance, E,TB, VU,NR by  at 8/4/2019 11:23 AM    Acceptance, E,TB, VU,NR by CS at 8/3/2019 11:38 AM                   Point: Precautions (Done)     Learning Progress Summary           Patient Acceptance, E,TB, VU,NR by  at 8/4/2019 11:23 AM    Acceptance, E,TB, VU,NR by  at 8/3/2019 11:38 AM                               User Key     Initials Effective Dates Name Provider Type Discipline    EM 04/03/18 -  Richa Francisco, PT Physical Therapist PT     05/14/18 -  Meliton Singh, PT Physical Therapist PT                PT Recommendation and Plan  Anticipated Discharge Disposition (PT): home with assist, home with home health  Therapy Frequency (PT Clinical Impression): daily  Outcome Summary/Treatment Plan (PT)  Anticipated Discharge Disposition (PT): home with assist, home with home health  Plan of Care Reviewed With: patient  Outcome Summary: Pt mobility better today, walked 120' and tolerated with mild complaints of pain. Will continue to progress as able.   Outcome Measures     Row Name 08/04/19 1100 08/03/19 1100 08/02/19 0900       How much help from another person do you currently need...    Turning from your back to your side while in flat bed without using bedrails?  3  -CS  2  -CS  2  -EM    Moving from lying on back to sitting on the side of a flat bed without bedrails?  3  -CS  2  -CS  2  -EM    Moving to and from a bed to a chair (including a wheelchair)?  3  -CS  2  -CS  2  -EM    Standing up from a chair using your arms (e.g., wheelchair, bedside chair)?  3  -CS  2  -CS  2  -EM    Climbing 3-5 steps with a railing?  2  -CS  1  -CS  1  -EM    To walk in hospital room?  2  -CS  2  -CS  2  -EM    AM-PAC 6 Clicks Score (PT)  16  -CS  11  -CS  11  -EM       Functional Assessment    Outcome Measure Options  AM-PAC 6 Clicks Basic Mobility (PT)  -CS  AM-PAC 6 Clicks Basic Mobility (PT)  -CS  AM-PAC 6 Clicks Basic Mobility (PT)  -EM      User Key  (r) = Recorded By, (t) = Taken By, (c) =  Cosigned By    Initials Name Provider Type    Richa Rodríguez, PT Physical Therapist    CS Meliton Singh PT Physical Therapist         Time Calculation:   PT Charges     Row Name 08/04/19 1124             Time Calculation    Start Time  1032  -CS      Stop Time  1049  -CS      Time Calculation (min)  17 min  -CS      PT Received On  08/04/19  -CS      PT - Next Appointment  08/05/19  -CS        User Key  (r) = Recorded By, (t) = Taken By, (c) = Cosigned By    Initials Name Provider Type    KARL Singh, Meliton TOUSSAINT, PT Physical Therapist        Therapy Charges for Today     Code Description Service Date Service Provider Modifiers Qty    08555861922 HC PT THER PROC EA 15 MIN 8/3/2019 Meliton Singh, PT GP 1    70885420114 HC PT THER SUPP EA 15 MIN 8/3/2019 Meliton Singh, PT GP 1    72027443099 HC PT THER PROC EA 15 MIN 8/4/2019 Meliton Singh, PT GP 1    23194224378 HC PT THER SUPP EA 15 MIN 8/4/2019 Meliton Singh, PT GP 1          PT G-Codes  Outcome Measure Options: AM-PAC 6 Clicks Basic Mobility (PT)  AM-PAC 6 Clicks Score (PT): 16    Meliton Singh PT  8/4/2019

## 2019-08-04 NOTE — PLAN OF CARE
Problem: Patient Care Overview  Goal: Plan of Care Review  Outcome: Ongoing (interventions implemented as appropriate)   08/04/19 6266   Coping/Psychosocial   Plan of Care Reviewed With patient   Plan of Care Review   Progress improving   OTHER   Outcome Summary AV wires removed today. VSS throughout shift. IV antibiotics continued. minimal c/o pain. ambulating in the mandel with staff. IJ cordis removed. weaned to 3L of oxygen. will continue to monitor.        Problem: Pain, Chronic (Adult)  Goal: Acceptable Pain/Comfort Level and Functional Ability  Outcome: Ongoing (interventions implemented as appropriate)      Problem: Fall Risk (Adult)  Goal: Absence of Fall  Outcome: Ongoing (interventions implemented as appropriate)      Problem: Pain, Acute (Adult)  Goal: Acceptable Pain Control/Comfort Level  Outcome: Ongoing (interventions implemented as appropriate)      Problem: Cardiac Surgery (Adult)  Goal: Signs and Symptoms of Listed Potential Problems Will be Absent, Minimized or Managed (Cardiac Surgery)  Outcome: Ongoing (interventions implemented as appropriate)

## 2019-08-04 NOTE — PROGRESS NOTES
· Severe 3 vessel CAD, STEMI---s/p urgernt CABGx5 POD#3(Dr. Zapien)  · HTN-- stable  · HLD-- statin    · Obesity   · Likely undiagnosed sleep apnea   · Leukocytosis--- WBC 21.8, down from 34 immediate post-op, broad spectrum abx started with concern for aspiration after vomiting while on vent         Mobilize and aggressive pulm toilet.   Pulm following.   IV diurese.  Discontinue AV wires  On 4L NC, wean as able.  Increase activity. May be ready for discharge in the next few days once O2 requirements decrease.       Khadra Velasco, KATHY  08/04/19  7:56 AM

## 2019-08-04 NOTE — PLAN OF CARE
Problem: Patient Care Overview  Goal: Plan of Care Review  Outcome: Ongoing (interventions implemented as appropriate)   08/04/19 1123   Coping/Psychosocial   Plan of Care Reviewed With patient   OTHER   Outcome Summary Pt mobility better today, walked 120' and tolerated with mild complaints of pain. Will continue to progress as able.

## 2019-08-05 VITALS
WEIGHT: 273.37 LBS | BODY MASS INDEX: 37.03 KG/M2 | SYSTOLIC BLOOD PRESSURE: 153 MMHG | TEMPERATURE: 98.4 F | HEIGHT: 72 IN | OXYGEN SATURATION: 94 % | HEART RATE: 93 BPM | DIASTOLIC BLOOD PRESSURE: 100 MMHG | RESPIRATION RATE: 16 BRPM

## 2019-08-05 LAB
ANION GAP SERPL CALCULATED.3IONS-SCNC: 13.8 MMOL/L (ref 5–15)
BUN BLD-MCNC: 29 MG/DL (ref 6–20)
BUN/CREAT SERPL: 31.2 (ref 7–25)
CALCIUM SPEC-SCNC: 8.5 MG/DL (ref 8.6–10.5)
CHLORIDE SERPL-SCNC: 99 MMOL/L (ref 98–107)
CO2 SERPL-SCNC: 22.2 MMOL/L (ref 22–29)
CREAT BLD-MCNC: 0.93 MG/DL (ref 0.76–1.27)
DEPRECATED RDW RBC AUTO: 47.8 FL (ref 37–54)
ERYTHROCYTE [DISTWIDTH] IN BLOOD BY AUTOMATED COUNT: 14.6 % (ref 12.3–15.4)
GFR SERPL CREATININE-BSD FRML MDRD: 85 ML/MIN/1.73
GLUCOSE BLD-MCNC: 134 MG/DL (ref 65–99)
GLUCOSE BLDC GLUCOMTR-MCNC: 131 MG/DL (ref 70–130)
GLUCOSE BLDC GLUCOMTR-MCNC: 167 MG/DL (ref 70–130)
GLUCOSE BLDC GLUCOMTR-MCNC: 179 MG/DL (ref 70–130)
HCT VFR BLD AUTO: 33.9 % (ref 37.5–51)
HGB BLD-MCNC: 10.6 G/DL (ref 13–17.7)
MCH RBC QN AUTO: 28.3 PG (ref 26.6–33)
MCHC RBC AUTO-ENTMCNC: 31.3 G/DL (ref 31.5–35.7)
MCV RBC AUTO: 90.4 FL (ref 79–97)
PLATELET # BLD AUTO: 307 10*3/MM3 (ref 140–450)
PMV BLD AUTO: 10.1 FL (ref 6–12)
POTASSIUM BLD-SCNC: 3.5 MMOL/L (ref 3.5–5.2)
RBC # BLD AUTO: 3.75 10*6/MM3 (ref 4.14–5.8)
SODIUM BLD-SCNC: 135 MMOL/L (ref 136–145)
WBC NRBC COR # BLD: 14.89 10*3/MM3 (ref 3.4–10.8)

## 2019-08-05 PROCEDURE — 80048 BASIC METABOLIC PNL TOTAL CA: CPT | Performed by: THORACIC SURGERY (CARDIOTHORACIC VASCULAR SURGERY)

## 2019-08-05 PROCEDURE — 82962 GLUCOSE BLOOD TEST: CPT

## 2019-08-05 PROCEDURE — 85027 COMPLETE CBC AUTOMATED: CPT | Performed by: THORACIC SURGERY (CARDIOTHORACIC VASCULAR SURGERY)

## 2019-08-05 PROCEDURE — 99233 SBSQ HOSP IP/OBS HIGH 50: CPT | Performed by: INTERNAL MEDICINE

## 2019-08-05 PROCEDURE — 99024 POSTOP FOLLOW-UP VISIT: CPT | Performed by: THORACIC SURGERY (CARDIOTHORACIC VASCULAR SURGERY)

## 2019-08-05 PROCEDURE — 25010000002 PIPERACILLIN SOD-TAZOBACTAM PER 1 G: Performed by: THORACIC SURGERY (CARDIOTHORACIC VASCULAR SURGERY)

## 2019-08-05 PROCEDURE — 97110 THERAPEUTIC EXERCISES: CPT

## 2019-08-05 PROCEDURE — 25010000002 MAGNESIUM SULFATE IN D5W 1G/100ML (PREMIX) 1-5 GM/100ML-% SOLUTION: Performed by: THORACIC SURGERY (CARDIOTHORACIC VASCULAR SURGERY)

## 2019-08-05 RX ORDER — HYDROCODONE BITARTRATE AND ACETAMINOPHEN 5; 325 MG/1; MG/1
1 TABLET ORAL EVERY 4 HOURS PRN
Qty: 50 TABLET | Refills: 0 | Status: SHIPPED | OUTPATIENT
Start: 2019-08-05 | End: 2019-12-12

## 2019-08-05 RX ORDER — MAGNESIUM SULFATE 1 G/100ML
1 INJECTION INTRAVENOUS
Status: COMPLETED | OUTPATIENT
Start: 2019-08-05 | End: 2019-08-05

## 2019-08-05 RX ORDER — FUROSEMIDE 40 MG/1
40 TABLET ORAL DAILY
Status: DISCONTINUED | OUTPATIENT
Start: 2019-08-05 | End: 2019-08-05 | Stop reason: HOSPADM

## 2019-08-05 RX ORDER — ATORVASTATIN CALCIUM 40 MG/1
40 TABLET, FILM COATED ORAL NIGHTLY
Qty: 30 TABLET | Refills: 2 | Status: SHIPPED | OUTPATIENT
Start: 2019-08-05 | End: 2019-11-04 | Stop reason: SDUPTHER

## 2019-08-05 RX ORDER — GUAIFENESIN 600 MG/1
1200 TABLET, EXTENDED RELEASE ORAL EVERY 12 HOURS SCHEDULED
Qty: 28 TABLET | Refills: 0 | Status: SHIPPED | OUTPATIENT
Start: 2019-08-05 | End: 2019-08-12

## 2019-08-05 RX ORDER — AMOXICILLIN AND CLAVULANATE POTASSIUM 875; 125 MG/1; MG/1
1 TABLET, FILM COATED ORAL EVERY 12 HOURS SCHEDULED
Status: DISCONTINUED | OUTPATIENT
Start: 2019-08-05 | End: 2019-08-05 | Stop reason: HOSPADM

## 2019-08-05 RX ORDER — AMIODARONE HYDROCHLORIDE 200 MG/1
TABLET ORAL
Qty: 84 TABLET | Refills: 1 | Status: SHIPPED | OUTPATIENT
Start: 2019-08-05 | End: 2019-09-12

## 2019-08-05 RX ORDER — AMOXICILLIN AND CLAVULANATE POTASSIUM 875; 125 MG/1; MG/1
1 TABLET, FILM COATED ORAL EVERY 12 HOURS SCHEDULED
Qty: 13 TABLET | Refills: 0 | Status: SHIPPED | OUTPATIENT
Start: 2019-08-05 | End: 2019-08-12

## 2019-08-05 RX ORDER — FUROSEMIDE 40 MG/1
40 TABLET ORAL DAILY
Qty: 4 TABLET | Refills: 0 | Status: SHIPPED | OUTPATIENT
Start: 2019-08-06 | End: 2019-08-10

## 2019-08-05 RX ORDER — POTASSIUM CHLORIDE 750 MG/1
20 CAPSULE, EXTENDED RELEASE ORAL DAILY
Status: DISCONTINUED | OUTPATIENT
Start: 2019-08-05 | End: 2019-08-05 | Stop reason: HOSPADM

## 2019-08-05 RX ORDER — POTASSIUM CHLORIDE 750 MG/1
20 CAPSULE, EXTENDED RELEASE ORAL DAILY
Qty: 8 CAPSULE | Refills: 0 | Status: SHIPPED | OUTPATIENT
Start: 2019-08-06 | End: 2019-08-10

## 2019-08-05 RX ADMIN — MUPIROCIN 1 APPLICATION: 20 OINTMENT TOPICAL at 10:06

## 2019-08-05 RX ADMIN — PANTOPRAZOLE SODIUM 40 MG: 40 TABLET, DELAYED RELEASE ORAL at 07:03

## 2019-08-05 RX ADMIN — MAGNESIUM SULFATE 1 G: 1 INJECTION INTRAVENOUS at 10:02

## 2019-08-05 RX ADMIN — HYDROCODONE BITARTRATE AND ACETAMINOPHEN 2 TABLET: 5; 325 TABLET ORAL at 07:03

## 2019-08-05 RX ADMIN — AMIODARONE HYDROCHLORIDE 400 MG: 200 TABLET ORAL at 10:03

## 2019-08-05 RX ADMIN — METOPROLOL TARTRATE 12.5 MG: 25 TABLET ORAL at 10:03

## 2019-08-05 RX ADMIN — POTASSIUM CHLORIDE 20 MEQ: 750 CAPSULE, EXTENDED RELEASE ORAL at 10:03

## 2019-08-05 RX ADMIN — METOPROLOL TARTRATE 12.5 MG: 25 TABLET ORAL at 18:40

## 2019-08-05 RX ADMIN — HYDROCODONE BITARTRATE AND ACETAMINOPHEN 2 TABLET: 5; 325 TABLET ORAL at 20:37

## 2019-08-05 RX ADMIN — AMOXICILLIN AND CLAVULANATE POTASSIUM 1 TABLET: 875; 125 TABLET, FILM COATED ORAL at 10:55

## 2019-08-05 RX ADMIN — GUAIFENESIN 1200 MG: 600 TABLET, EXTENDED RELEASE ORAL at 10:03

## 2019-08-05 RX ADMIN — TAZOBACTAM SODIUM AND PIPERACILLIN SODIUM 4.5 G: 500; 4 INJECTION, SOLUTION INTRAVENOUS at 03:01

## 2019-08-05 RX ADMIN — MAGNESIUM SULFATE 1 G: 1 INJECTION INTRAVENOUS at 10:06

## 2019-08-05 RX ADMIN — FUROSEMIDE 40 MG: 40 TABLET ORAL at 10:03

## 2019-08-05 RX ADMIN — ALPRAZOLAM 0.25 MG: 0.25 TABLET ORAL at 02:11

## 2019-08-05 RX ADMIN — ASPIRIN 325 MG: 325 TABLET, COATED ORAL at 10:03

## 2019-08-05 RX ADMIN — POTASSIUM CHLORIDE 40 MEQ: 750 CAPSULE, EXTENDED RELEASE ORAL at 07:03

## 2019-08-05 NOTE — THERAPY TREATMENT NOTE
Acute Care - Physical Therapy Treatment Note  [facility]     Patient Name: Marco Singletary  : 1965  MRN: [id number]  Today's Date: 2019  Onset of Illness/Injury or Date of Surgery: 19          Admit Date: 2019    Visit Dx:    ICD-10-CM ICD-9-CM   1. Coronary artery disease involving native coronary artery of native heart with unstable angina pectoris (CMS/HCC) I25.110 414.01     411.1   2. Impaired mobility Z74.09 799.89     Patient Active Problem List   Diagnosis   • CAD (coronary artery disease)   • Coronary artery disease involving native coronary artery of native heart with unstable angina pectoris (CMS/HCC)       Therapy Treatment    Rehabilitation Treatment Summary     Row Name 19 0854             Treatment Time/Intention    Discipline  physical therapist  -PC      Document Type  therapy note (daily note)  -PC      Subjective Information  no complaints  -PC      Mode of Treatment  physical therapy  -PC      Patient/Family Observations  pt in chair, no acute distress  -PC      Therapy Frequency (PT Clinical Impression)  daily  -PC      Patient Effort  good  -PC      Existing Precautions/Restrictions  cardiac;fall;sternal  -PC      Recorded by [PC] Naa eNal, PT 19      Row Name 19 0854             Cognitive Assessment/Intervention- PT/OT    Orientation Status (Cognition)  oriented x 4  -PC      Follows Commands (Cognition)  WNL  -PC      Recorded by [PC] Naa Neal, PT 19 0857      Row Name 19 0854             Transfer Assessment/Treatment    Transfer Assessment/Treatment  sit-stand transfer;stand-sit transfer  -PC      Recorded by [PC] Naa Neal, PT 19 08      Row Name 19 0854             Sit-Stand Transfer    Sit-Stand [place] (Transfers)  independent  -PC      Recorded by [PC] Naa Neal, PT 19 0857      Row Name 19 0854             Stand-Sit Transfer    Stand-Sit [place] (Transfers)   contact guard  -PC      Recorded by [PC] Naa Neal, PT 08/05/19 0857      Row Name 08/05/19 0854             Gait/Stairs Assessment/Training    [place] Level (Gait)  supervision  -PC      Distance in Feet (Gait)  360 ft  -PC      Negotiation (Stairs)  stairs independence  -PC      [place] Level (Stairs)  supervision  -PC      Handrail Location (Stairs)  right side (ascending)  -PC      Number of Steps (Stairs)  9  -PC      Recorded by [PC] Naa Neal, PT 08/05/19 0857      Row Name 08/05/19 0854             Therapeutic Exercise    Comment (Therapeutic Exercise)  level V, reviewed home walking program and signs and symptoms of overexertion, energy conservation  -PC      Recorded by [PC] Naa Neal, PT 08/05/19 0857      Row Name 08/05/19 0854             Positioning and Restraints    Pre-Treatment Position  sitting in chair/recliner  -PC      Post Treatment Position  chair  -PC      In Chair  sitting;call light within reach;encouraged to call for assist  -PC      Recorded by [PC] Naa Neal, PT 08/05/19 0857      Row Name 08/05/19 0854             Pain Scale: Numbers Pre/Post-Treatment    Pain Scale: Numbers, Pretreatment  --  -PC      Pain Location  --  -PC      Recorded by [PC] Naa Neal, PT 08/05/19 0857      Row Name                Wound 08/01/19 1102 chest incision    Wound - Properties Group Date first assessed: 08/01/19 [SR] Time first assessed: 1102 [SR] Location: chest [SR] Type: incision [SR] Recorded by:  [SR] Mallory Biggs RN 08/01/19 1102    Row Name                Wound 08/01/19 1102 Left leg incision    Wound - Properties Group Date first assessed: 08/01/19 [SR] Time first assessed: 1102 [SR] Side: Left [SR] Location: leg [SR] Type: incision [SR] Recorded by:  [SR] Mallory Biggs RN 08/01/19 1102    Row Name 08/05/19 0854             Coping    Observed Emotional State  accepting;calm;cooperative  -PC      Verbalized Emotional State  acceptance  -PC      Recorded by  [PC] Naa Neal, PT 08/05/19 0857      Row Name 08/05/19 0854             Outcome Summary/Treatment Plan (PT)    Anticipated Discharge Disposition (PT)  home with assist  -PC      Recorded by [PC] Naa Neal, PT 08/05/19 0857        User Key  (r) = Recorded By, (t) = Taken By, (c) = Cosigned By    Initials Name Effective Dates Discipline    PC Naa Neal, PT 04/03/18 -  PT    SR Mallory Biggs RN 06/16/16 -  Nurse          Wound 08/01/19 1102 chest incision (Active)   Dressing Appearance open to air 8/5/2019  3:12 AM   Closure Approximated;Open to air 8/5/2019  3:12 AM   Base clean;dry;pink;scab 8/5/2019  3:12 AM   Periwound intact;dry 8/5/2019  3:12 AM   Periwound Temperature warm 8/5/2019  3:12 AM   Drainage Amount none 8/5/2019  3:12 AM   Care, Wound cleansed with;antimicrobial agent applied 8/4/2019  4:35 PM   Dressing Care, Wound open to air 8/5/2019  3:12 AM       Wound 08/01/19 1102 Left leg incision (Active)   Dressing Appearance open to air 8/5/2019  3:12 AM   Closure Liquid skin adhesive;Open to air 8/5/2019  3:12 AM   Base clean;dry;pink 8/5/2019  3:12 AM   Periwound intact;dry;ecchymotic 8/5/2019  3:12 AM   Periwound Temperature warm 8/5/2019  3:12 AM   Drainage Amount none 8/5/2019  3:12 AM   Dressing Care, Wound open to air 8/5/2019  3:12 AM           Physical Therapy Education     Title: PT OT SLP Therapies (In Progress)     Topic: Physical Therapy (In Progress)     Point: Mobility training (In Progress)     Learning Progress Summary           Patient Acceptance, E,D, NR by PC at 8/5/2019  8:57 AM    Acceptance, E,TB, VU,NR by CS at 8/4/2019 11:23 AM    Acceptance, E,TB, VU,NR by CS at 8/3/2019 11:38 AM    Acceptance, E, NR by EM at 8/2/2019  9:29 AM                   Point: Home exercise program (In Progress)     Learning Progress Summary           Patient Acceptance, E,D, NR by PC at 8/5/2019  8:57 AM    Acceptance, E,TB, CHAD,NR by CS at 8/4/2019 11:23 AM    Acceptance, E,TB, VU,NR by  CS at 8/3/2019 11:38 AM    Acceptance, E, NR by EM at 8/2/2019  9:29 AM                   Point: Body mechanics (In Progress)     Learning Progress Summary           Patient Acceptance, E,D, NR by  at 8/5/2019  8:57 AM    Acceptance, E,TB, VU,NR by CS at 8/4/2019 11:23 AM    Acceptance, E,TB, VU,NR by CS at 8/3/2019 11:38 AM                   Point: Precautions (In Progress)     Learning Progress Summary           Patient Acceptance, E,D, NR by PC at 8/5/2019  8:57 AM    Acceptance, E,TB, VU,NR by CS at 8/4/2019 11:23 AM    Acceptance, E,TB, VU,NR by CS at 8/3/2019 11:38 AM                               User Key     Initials Effective Dates Name Provider Type Discipline     04/03/18 -  Naa Neal, PT Physical Therapist PT    EM 04/03/18 -  Richa Francisco, PT Physical Therapist PT     05/14/18 -  Meliton Singh, PT Physical Therapist PT                PT Recommendation and Plan  Anticipated Discharge Disposition (PT): home with assist  Therapy Frequency (PT Clinical Impression): daily  Outcome Summary/Treatment Plan (PT)  Anticipated Discharge Disposition (PT): home with assist  Plan of Care Reviewed With: patient  Progress: improving  Outcome Summary: pt is doing well with mobility, walking in mandel and able to go up and down stairs without difficulty. Ready for D/C   Outcome Measures     Row Name 08/05/19 0800 08/04/19 1100 08/03/19 1100       How much help from another person do you currently need...    Turning from your back to your side while in flat bed without using bedrails?  4  -PC  3  -CS  2  -CS    Moving from lying on back to sitting on the side of a flat bed without bedrails?  4  -PC  3  -CS  2  -CS    Moving to and from a bed to a chair (including a wheelchair)?  4  -PC  3  -CS  2  -CS    Standing up from a chair using your arms (e.g., wheelchair, bedside chair)?  4  -PC  3  -CS  2  -CS    Climbing 3-5 steps with a railing?  3  -PC  2  -CS  1  -CS    To walk in hospital room?  4  -PC   2  -CS  2  -CS    AM-PAC 6 Clicks Score (PT)  23  -PC  16  -CS  11  -CS       Functional Assessment    Outcome Measure Options  --  AM-PAC 6 Clicks Basic Mobility (PT)  -CS  AM-PAC 6 Clicks Basic Mobility (PT)  -CS      User Key  (r) = Recorded By, (t) = Taken By, (c) = Cosigned By    Initials Name Provider Type    PC Naa Neal, PT Physical Therapist    Meliton Worthy, PT Physical Therapist         Time Calculation:   PT Charges     Row Name 08/05/19 0902             Time Calculation    Start Time  0840  -PC      Stop Time  0855  -PC      Time Calculation (min)  15 min  -PC      PT Received On  08/05/19  -PC      PT - Next Appointment  08/06/19  -PC        User Key  (r) = Recorded By, (t) = Taken By, (c) = Cosigned By    Initials Name Provider Type    PC Naa Neal, PT Physical Therapist        Therapy Charges for Today     Code Description Service Date Service Provider Modifiers Qty    99795385724 HC PT THER PROC EA 15 MIN 8/5/2019 Naa Neal, PT GP 1          PT G-Codes  Outcome Measure Options: AM-PAC 6 Clicks Basic Mobility (PT)  AM-PAC 6 Clicks Score (PT): 23    Naa Neal PT  8/5/2019

## 2019-08-05 NOTE — DISCHARGE INSTRUCTIONS
Continue to use your incentive spirometer for an additional 2 weeks.  Continue to wear your CARMEN hose for an additional 2 weeks. You may remove them at night.  Walk 10 minutes at a time at least 3 times a day.  Do not drive for 2 weeks and no longer taking narcotics. Ride in the backseat for 2 weeks.  Do not lift, push or pull greater than 10 pounds for 6 weeks.  You may shower, but do not submerge your incisions until your surgeon approves (i.e., no baths, pools, hot tubs, etc.).  Clean your incision daily in the shower with Dial or Ivory soap. Do not put any additional lotions, creams, or any other substance on your incision without your surgeon's approval.  Call your surgeon’s office at [phone] for any drainage, increased redness, or fever over 100.5.  Weigh yourself daily and report a weight gain of 2-3+ pounds in 24 hours or 5 lbs in 1 week to your cardiologist. Record your daily weights.  Take your temperature & blood pressure daily. Keep a record of your readings along with your daily weights to report to your cardiologist at your next follow-up appointment. Please take all of your medication bottlers to each appointment.

## 2019-08-05 NOTE — PLAN OF CARE
Problem: Patient Care Overview  Goal: Plan of Care Review  Outcome: Ongoing (interventions implemented as appropriate)   08/05/19 0751   Coping/Psychosocial   Plan of Care Reviewed With patient   Plan of Care Review   Progress improving   OTHER   Outcome Summary pt is doing well with mobility, walking in mandel and able to go up and down stairs without difficulty. Ready for D/C

## 2019-08-05 NOTE — DISCHARGE SUMMARY
Date of Admission:  7/30/2019  Date of Discharge:  8/5/2019    Discharge Diagnosis:   · Severe 3 vessel CAD now s/p CABG  · STEMI  · HTN  · HLD  · Obesity   · Likely CHAD  · Leukocytosis post-op  · Possible aspiration pneumonia     Presenting Problem/History of Present Illness:  · Severe 3 vessel CAD   · STEMI  · HTN  · HLD  · Obesity      Hospital Course:  Patient is a 54 y.o. male who was transferred to [facility] from [facility] on 7/30/19 after being diagnosed with a STEMI and severe 3 vessel CAD. He was seen and evaluated by Dr. Zapien and it was recommended that he undergo CABG. Pre-operatively he was evaluated by Pulmonology for possible sleep apnea. On 8/1/19 he was taken to the Operating Room and under general anesthesia and via median sternotomy underwent CABG x 5 by Dr. Zapien. Patient tolerated the procedure well and was transported to the Open Heart Recovery Unit in stable condition. Later that day, patient vomited while still intubated. His ET tube was suctioned and no vomitus was noted, but he was subsequently started on broad spectrum antibiotics for possible aspiration. Later that evening, patient was successfully weaned from the ventilator and extubated. On post-operative day #1 he was transferred out of the Open Heart Recovery Unit to the Cardiac step-down unit. The rest of his hospital course was uneventful and one of gradual improvement. On post-operative day #4, on 8/5/19, he was deemed stable for discharge home with [facility]. He was discharged home on Aspirin, stain, beta blocker, a 5 day course of Lasix/Potassium, and Amoxicillin to complete a full 10 day course of antibiotics. He will have a CBC rechecked in 1 week and should have an outpatient evaluation for CHAD.         Procedures Performed:  Procedure(s) 8/1/19 by Dr. Zapein:  Urgent coronary artery bypass x5 with LIMA to distal LAD, saphenous vein to diagonal, saphenous vein to OM 2, saphenous vein  sequential to PDA and left ventricular branch.  Left lower extremity endoscopic vein harvest.  PRP application to LIMA bed and sternum.  Intraoperative transesophageal echocardiogram.    Consults:   Consults     Date and Time Order Name Status Description    7/31/2019 1136 Inpatient Cardiology Consult Completed     7/30/2019 1642 Inpatient Pulmonology Consult Completed           Pertinent Test Results:    Lab Results   Component Value Date    WBC 14.89 (H) 08/05/2019    HGB 10.6 (L) 08/05/2019    HCT 33.9 (L) 08/05/2019    MCV 90.4 08/05/2019     08/05/2019      Lab Results   Component Value Date    GLUCOSE 134 (H) 08/05/2019    CALCIUM 8.5 (L) 08/05/2019     (L) 08/05/2019    K 3.5 08/05/2019    CO2 22.2 08/05/2019    CL 99 08/05/2019    BUN 29 (H) 08/05/2019    CREATININE 0.93 08/05/2019    EGFRIFNONA 85 08/05/2019    BCR 31.2 (H) 08/05/2019    ANIONGAP 13.8 08/05/2019     Lab Results   Component Value Date    INR 1.27 (H) 08/02/2019    PROTIME 15.6 (H) 08/02/2019       Condition on Discharge: Stable     Vital Signs  Temp:  [98.4 °F (36.9 °C)-99.5 °F (37.5 °C)] 98.4 °F (36.9 °C)  Heart Rate:  [] 92  Resp:  [16-18] 16  BP: (122-142)/(78-95) 135/87      Discharge Disposition  Home-Health Care Svc    Discharge Medications     Discharge Medications      New Medications      Instructions Start Date   amiodarone 400 MG tablet  Commonly known as:  PACERONE   200mg BID x 7 days then 200mg daily      amoxicillin-clavulanate 875-125 MG per tablet  Commonly known as:  AUGMENTIN   1 tablet, Oral, Every 12 Hours Scheduled      aspirin 325 MG EC tablet   325 mg, Oral, Daily   Start Date:  8/6/2019     atorvastatin 40 MG tablet  Commonly known as:  LIPITOR   40 mg, Oral, Nightly      furosemide 40 MG tablet  Commonly known as:  LASIX   40 mg, Oral, Daily   Start Date:  8/6/2019     guaiFENesin 600 MG 12 hr tablet  Commonly known as:  MUCINEX   1,200 mg, Oral, Every 12 Hours Scheduled       HYDROcodone-acetaminophen 5-325 MG per tablet  Commonly known as:  NORCO   1 tablet, Oral, Every 4 Hours PRN      metoprolol tartrate 25 MG tablet  Commonly known as:  LOPRESSOR   12.5 mg, Oral, Every 12 Hours Scheduled      potassium chloride 10 MEQ CR capsule  Commonly known as:  MICRO-K   20 mEq, Oral, Daily   Start Date:  8/6/2019            Discharge Diet: Healthy heart    Activity at Discharge:    · No driving x 2 weeks or while taking narcotic pain medication  · No lifting > 10 pounds until cleared by surgeon  · Ambulate 10 minutes at least 3 times a day  · Clean incisions daily with antibacterial soap and water    Follow-up Appointments  No future appointments.  Additional Instructions for the Follow-ups that You Need to Schedule     Call MD With Problems / Concerns   As directed      Instructions:  Call office at [phone] for any drainage, increased redness, or fever over 100.5    Order Comments:  Instructions:  Call office at [phone] for any drainage, increased redness, or fever over 100.5          Discharge Follow-up with PCP   As directed       Currently Documented PCP:    Provider, No Known    PCP Phone Number:    [phone]     Follow Up Details:  in 1 week         Discharge Follow-up with Specialty: Cardiologist APRN/PA; 1 Week   As directed      Specialty:  Cardiologist APRN/PA    Follow Up:  1 Week    Follow Up Details:  bring all prescription bottles to appointment, call for appointment         Discharge Follow-up with Specified Provider: Cardiologist; 1 Month   As directed      To:  Cardiologist    Follow Up:  1 Month    Follow Up Details:  call for appointment, bring all medication bottles to appointment         Discharge Follow-up with Specified Provider: Dr. Zapien   As directed      To:  Dr. Zapien    Follow Up Details:  4-6 weeks, bring all current medications to appointment         Referral to Home Health   As directed      Check CBC in 1 week with results to Dr. Zapien    Order  Comments:  Check CBC in 1 week with results to Dr. Zapien     Face to Face Visit Date:  8/5/2019    Follow-up Provider for Plan of Care?:  I will be treating the patient on an ongoing basis.  Please send me the Plan of Care for signature.    Follow-up Provider:  JANICE ZAPIEN [[id number]]    Reason/Clinical Findings:  post-op CABG    Describe mobility limitations that make leaving home difficult:  weakness    Nursing/Therapeutic Services Requested:  Skilled Nursing    Skilled nursing orders:  Post CABG care    Frequency:  1 Week 1         CBC & Differential    Aug 12, 2019 (Approximate)      Home health to check CBC with results to Dr. Zapien    Order Comments:  Home health to check CBC with results to Dr. Zapien     Manual Differential:  No             **This Discharge Summary is being written for completion of medical records. I was not personally involved in this patient's discharge home from the hospital.        AUGUSTA Gar  08/05/19  5:09 PM

## 2019-08-05 NOTE — PAYOR COMM NOTE
"Marco Chase (54 y.o. Male)     PLEASE SEE ATTACHED CLINICAL REVIEW.     REF#[id number]    PLEASE CALL [phone] OR  [phone] WITH CONTINUED STAY AUTH AND DAYS APPROVED    THANK YOU    LYNNETTE ANDRADE LPN CCP        Date of Birth Social Security Number Address Home Phone MRN    1965  [address] [facility] [phone] [id number]    Yazdanism Marital Status          None        Admission Date Admission Type Admitting Provider Attending Provider Department, Room/Bed    7/30/19 Urgent Christiano Zapien MD Khan, Ahmad Aftab, MD [facility] CARDIOVASC UNIT, 2229/1    Discharge Date Discharge Disposition Discharge Destination                       Attending Provider:  Christiano Zapien MD    Allergies:  Bee Venom    Isolation:  None   Infection:  None   Code Status:  CPR    Ht:  182 cm (71.65\")   Wt:  124 kg (273 lb 5.9 oz)    Admission Cmt:  None   Principal Problem:  Coronary artery disease involving native coronary artery of native heart with unstable angina pectoris (CMS/HCC) [I25.110] More...                 Active Insurance as of 7/30/2019     Primary Coverage     Payor Plan Insurance Group Employer/Plan Group    [facility] BLUE CROSS [facility] BLUE CROSS BLUE [facility] [id number]     Payor Plan Address Payor Plan Phone Number Payor Plan Fax Number Effective Dates    [address] [phone]  1/1/2017 - None Entered    [place] [id number]       Subscriber Name Subscriber Birth Date Member ID       MARCO CHASE 1965 [id number]                 Emergency Contacts      (Rel.) Home Phone Work Phone Mobile Phone    Priscilla Chase (Spouse) -- -- [phone]               Intake and Output (last 48 hours)      Intake/Output     Row Name 08/05/19 1006 08/05/19 1002 08/05/19 0916 08/05/19 0900 08/05/19 0738       Intake    P.O.  --  --  --  --  480 mL    Intake (%)  --  --  --  --  Breakfast;100%       Magnesium Sulfate    Dose (g) " Magnesium  1 g  1 g  --  --  --    Rate Magnesium   --  --  --  --  --    Concentration Magnesium   0.01 g/mL  0.01 g/mL  --  --  --       Urine Output    Urine  --  --  --  200 mL  --       Stool Output    Stool Unmeasured Occurrence  --  --  1  --  --    Bowel Incontinence  --  --  No  --  --    Stool Amount  --  --  moderate  --  --       Wound 08/01/19 1102 chest incision    Wound - Properties Group Date first assessed: 08/01/19 Time first assessed: 1102 Location: chest Type: incision       Wound 08/01/19 1102 Left leg incision    Wound - Properties Group Date first assessed: 08/01/19 Time first assessed: 1102 Side: Left Location: leg Type: incision    Row Name 08/05/19 0703 08/05/19 0312 08/05/19 0301 08/05/19 0204 08/04/19 2206       Intake    P.O.  100 mL  --  --  50 mL  100 mL    I.V.  142 mL  --  --  --  --       piperacillin-tazobactam (ZOSYN) 4.5 g in iso-osmotic dextrose 100 mL IVPB (premix) - Peripheral IV 08/04/19 1141 Anterior;Left;Proximal Forearm     Start: 08/02/19 0400    Dose  --  --  *4.5 g  --  --    Volume (mL)  --  --  100  --  --       Urine Output    Urine  --  --  --  200 mL  --       Wound 08/01/19 1102 chest incision    Wound - Properties Group Date first assessed: 08/01/19 Time first assessed: 1102 Location: chest Type: incision    Dressing Appearance  --  open to air  --  --  --    Closure  --  Approximated;Open to air  --  --  --    Base  --  clean;dry;pink;scab  --  --  --    Periwound  --  intact;dry  --  --  --    Periwound Temperature  --  warm  --  --  --    Drainage Amount  --  none  --  --  --    Dressing Care, Wound  --  open to air  --  --  --       Wound 08/01/19 1102 Left leg incision    Wound - Properties Group Date first assessed: 08/01/19 Time first assessed: 1102 Side: Left Location: leg Type: incision    Dressing Appearance  --  open to air  --  --  --    Closure  --  Liquid skin adhesive;Open to air  --  --  --    Base  --  clean;dry;pink  --  --  --    Periwound  --   intact;dry;ecchymotic  --  --  --    Periwound Temperature  --  warm  --  --  --    Drainage Amount  --  none  --  --  --    Dressing Care, Wound  --  open to air  --  --  --    Row Name 08/04/19 2057 08/04/19 2053 08/04/19 1853 08/04/19 1635 08/04/19 1558       Intake    P.O.  --  150 mL  --  --  --       piperacillin-tazobactam (ZOSYN) 4.5 g in iso-osmotic dextrose 100 mL IVPB (premix) - Peripheral IV 08/04/19 1141 Anterior;Left;Proximal Forearm     Start: 08/02/19 0400    Dose  *4.5 g  --  --  --  --    Volume (mL)  100  --  --  --  --       Stool Output    Stool Unmeasured Occurrence  --  --  1  --  1    Bowel Incontinence  --  --  No  --  No    Stool Amount  --  --  small  --  small       Wound 08/01/19 1102 chest incision    Wound - Properties Group Date first assessed: 08/01/19 Time first assessed: 1102 Location: chest Type: incision    Dressing Appearance  --  open to air  --  open to air  --    Closure  --  Approximated;Open to air  --  Approximated  --    Base  --  clean;dry;pink;scab  --  dry;clean  --    Periwound  --  intact;dry  --  --  --    Periwound Temperature  --  warm  --  --  --    Drainage Amount  --  none  --  none  --    Care, Wound  --  --  --  cleansed with;antimicrobial agent applied  --    Dressing Care, Wound  --  open to air  --  --  --       Wound 08/01/19 1102 Left leg incision    Wound - Properties Group Date first assessed: 08/01/19 Time first assessed: 1102 Side: Left Location: leg Type: incision    Dressing Appearance  --  open to air  --  intact;dry  --    Closure  --  Liquid skin adhesive;Open to air  --  Liquid skin adhesive  --    Base  --  clean;dry;pink  --  --  --    Periwound  --  intact;dry;ecchymotic  --  --  --    Periwound Temperature  --  warm  --  --  --    Drainage Amount  --  none  --  none  --    Dressing Care, Wound  --  open to air  --  --  --    Row Name 08/04/19 1500 08/04/19 1330 08/04/19 1300 08/04/19 1215 08/04/19 1205       piperacillin-tazobactam (ZOSYN)  4.5 g in iso-osmotic dextrose 100 mL IVPB (premix) - Peripheral IV 08/04/19 1141 Anterior;Left;Proximal Forearm     Start: 08/02/19 0400    Dose  --  --  --  --  *4.5 g    Volume (mL)  --  --  --  --  100       Urine Output    Urine  300 mL  --  450 mL  --  --       Wound 08/01/19 1102 chest incision    Wound - Properties Group Date first assessed: 08/01/19 Time first assessed: 1102 Location: chest Type: incision    Dressing Appearance  --  open to air  --  dry;intact  --    Closure  --  Approximated  --  Unable to assess  --    Base  --  dry;clean  --  dressing in place, unable to visualize  --    Drainage Amount  --  --  --  none  --    Dressing Care, Wound  --  dressing removed  --  --  --       Wound 08/01/19 1102 Left leg incision    Wound - Properties Group Date first assessed: 08/01/19 Time first assessed: 1102 Side: Left Location: leg Type: incision    Dressing Appearance  --  --  --  intact;dry  --    Closure  --  --  --  Liquid skin adhesive  --    Drainage Amount  --  --  --  none  --    Row Name 08/04/19 1028 08/04/19 0920 08/04/19 0809 08/04/19 0700 08/04/19 0600       Weights    Weight  124 kg (273 lb 5.9 oz)  --  --  124 kg (272 lb 8 oz)  --    Weight Method  --  --  --  Standing scale  --    Ideal Body Weight (IBW) (kg)  81.1  --  --  --  --    BSA (Calculated - sq m)  2.42 sq meters  --  --  --  --    BMI (Calculated)  37.4  --  --  --  --       Intake    P.O.  --  540 mL  --  --  --    Intake (%)  --  Breakfast;100%  --  --  --       Urine Output    Urine  --  --  --  --  160 mL       Stool Output    Stool Unmeasured Occurrence  1  --  --  --  --    Bowel Incontinence  No  --  --  --  --    Stool Amount  moderate  --  --  --  --       Wound 08/01/19 1102 chest incision    Wound - Properties Group Date first assessed: 08/01/19 Time first assessed: 1102 Location: chest Type: incision    Dressing Appearance  --  --  dry;intact  --  --    Closure  --  --  Unable to assess  --  --    Base  --  --   dressing in place, unable to visualize  --  --    Drainage Amount  --  --  none  --  --       Wound 08/01/19 1102 Left leg incision    Wound - Properties Group Date first assessed: 08/01/19 Time first assessed: 1102 Side: Left Location: leg Type: incision    Dressing Appearance  --  --  intact;dry  --  --    Closure  --  --  Liquid skin adhesive  --  --    Drainage Amount  --  --  none  --  --    Row Name 08/04/19 0537 08/04/19 0120 08/04/19 0000 08/03/19 2059 08/03/19 2050       piperacillin-tazobactam (ZOSYN) 4.5 g in iso-osmotic dextrose 100 mL IVPB (premix) - Peripheral IV 08/04/19 1141 Anterior;Left;Proximal Forearm     Start: 08/02/19 0400    Dose  *4.5 g  --  --  *4.5 g  --       Urine Output    Urine  --  360 mL  --  --  --       Wound 08/01/19 1102 chest incision    Wound - Properties Group Date first assessed: 08/01/19 Time first assessed: 1102 Location: chest Type: incision    Dressing Appearance  --  --  dry;intact  --  dry;intact    Closure  --  --  Unable to assess  --  Unable to assess    Base  --  --  dressing in place, unable to visualize  --  dressing in place, unable to visualize    Drainage Amount  --  --  none  --  none       Wound 08/01/19 1102 Left leg incision    Wound - Properties Group Date first assessed: 08/01/19 Time first assessed: 1102 Side: Left Location: leg Type: incision    Dressing Appearance  --  --  intact;dry  --  intact;dry    Closure  --  --  Liquid skin adhesive  --  Liquid skin adhesive    Drainage Amount  --  --  none  --  none    Row Name 08/03/19 1817 08/03/19 1700 08/03/19 1615 08/03/19 1300 08/03/19 1234       Intake    P.O.  --  --  --  --  120 mL    Intake (%)  --  --  --  --  Lunch;25%       Urine Output    Urine  250 mL  --  --  --  --    Urine Unmeasured Occurrence  --  1  --  1  --    Urinary Incontinence  --  Yes  --  No  --    Urine Amount  --  Large  --  Large around catheter  --       [REMOVED] Urethral Catheter Temperature probe 16 Fr.    Urethral Catheter  Properties Placement Date: 08/01/19 Placement Time: 0715 , F/C INSERTED WITHOUT DIFFICULTY. CLEAR YELLOW URINE NOTED. FORESKIN REPLACED BACK INTO FORWARD POSITION WITHOUT DIFFICULTY.  Indications: Selected surgeries ( tract, abdomen) Inserted by: SANDI HAWKINS RN Number Of Insertion Attempts: 1 Hand Hygiene Completed: Yes Catheter Type: Temperature probe Tube Size (Fr.): 16 Fr. Catheter Balloon Size: 10 mL Urine Returned: Yes Removal Date: 08/03/19 Removal Time: 1330 Removal Reason : Per order       [REMOVED] Y Chest Tube 1 and 2 1 Left Pleural 28 Fr. 2 Mediastinal 28 Fr.    Y Chest Tube Properties 1 and 2 Placement Date: 08/01/19 Placement Time: 1100 Inserted by: DR. RUBIN Hand Hygiene Completed: Yes Tube Number 1: 1 Chest Tube Orientation 1: Left Chest Tube Location 1: Pleural Size (Fr.) 1: 28 Fr. , STRAIGHT  Tube Number 2: 2 Chest Tube Location 2: Mediastinal Size (Fr.) 2: 28 Fr. , STRAIGHT  Removal Date: 08/03/19 Removal Time: 1325 Removal Reason : Per order       Wound 08/01/19 1102 chest incision    Wound - Properties Group Date first assessed: 08/01/19 Time first assessed: 1102 Location: chest Type: incision    Dressing Appearance  --  --  dry;intact  --  --    Closure  --  --  Unable to assess  --  --    Base  --  --  dressing in place, unable to visualize  --  --    Drainage Amount  --  --  none  --  --       Wound 08/01/19 1102 Left leg incision    Wound - Properties Group Date first assessed: 08/01/19 Time first assessed: 1102 Side: Left Location: leg Type: incision    Dressing Appearance  --  --  intact;dry  --  --    Closure  --  --  Liquid skin adhesive  --  --    Drainage Amount  --  --  none  --  --    Row Name 08/03/19 1223                   piperacillin-tazobactam (ZOSYN) 4.5 g in iso-osmotic dextrose 100 mL IVPB (premix) - Peripheral IV 08/04/19 1141 Anterior;Left;Proximal Forearm     Start: 08/02/19 0400    Dose  *4.5 g        Volume (mL)  100           Urine Output    Urine Unmeasured Occurrence  1         Urinary Incontinence  No        Urine Amount  Large leakage around pickett           [REMOVED] Urethral Catheter Temperature probe 16 Fr.    Urethral Catheter Properties Placement Date: 08/01/19 Placement Time: 0715 , F/C INSERTED WITHOUT DIFFICULTY. CLEAR YELLOW URINE NOTED. FORESKIN REPLACED BACK INTO FORWARD POSITION WITHOUT DIFFICULTY.  Indications: Selected surgeries ( tract, abdomen) Inserted by: SANDI HAWKINS RN Number Of Insertion Attempts: 1 Hand Hygiene Completed: Yes Catheter Type: Temperature probe Tube Size (Fr.): 16 Fr. Catheter Balloon Size: 10 mL Urine Returned: Yes Removal Date: 08/03/19 Removal Time: 1330 Removal Reason : Per order    Daily Indications  Monitoring of strict I &O        Collection Container  Standard drainage bag        Securement Method  Securing device        Output (mL)  50 mL           [REMOVED] Y Chest Tube 1 and 2 1 Left Pleural 28 Fr. 2 Mediastinal 28 Fr.    Y Chest Tube Properties 1 and 2 Placement Date: 08/01/19 Placement Time: 1100 Inserted by: DR. RUBIN Hand Hygiene Completed: Yes Tube Number 1: 1 Chest Tube Orientation 1: Left Chest Tube Location 1: Pleural Size (Fr.) 1: 28 Fr. , STRAIGHT  Tube Number 2: 2 Chest Tube Location 2: Mediastinal Size (Fr.) 2: 28 Fr. , STRAIGHT  Removal Date: 08/03/19 Removal Time: 1325 Removal Reason : Per order    Function  -20 cm H2O        Left Subcutaneous Emphysema  none present        Right Subcutaneous Emphysema  none present        Drainage Description 1  Serosanguineous        Air Leak/Fluctuation 1  air leak not present;dependent drainage cleared        Dressing Type 1  Gauze        Dressing Status 1  Clean;Dry;Intact        Site Assessment 1  Not assessed        Surrounding Skin 1  Dry;Intact        Drainage Description 2  Serosanguineous        Air Leak/Fluctuation 2  air leak not present;dependent drainage cleared        Dressing Type 2  Gauze        Dressing Status 2  Clean;Dry;Intact        Site Assessment 2  Not assessed         Surrounding Skin 2  Dry;Intact        Output (mL)  100 mL           Wound 08/01/19 1102 chest incision    Wound - Properties Group Date first assessed: 08/01/19 Time first assessed: 1102 Location: chest Type: incision    Dressing Appearance  dry;intact        Closure  Unable to assess        Base  dressing in place, unable to visualize        Drainage Amount  none           Wound 08/01/19 1102 Left leg incision    Wound - Properties Group Date first assessed: 08/01/19 Time first assessed: 1102 Side: Left Location: leg Type: incision    Dressing Appearance  intact;dry        Closure  Liquid skin adhesive        Drainage Amount  none            Lines, Drains & Airways    Active LDAs     Name:   Placement date:   Placement time:   Site:   Days:    Peripheral IV 08/04/19 1141 Anterior;Left;Proximal Forearm   08/04/19    1141    Forearm   less than 1                Blood Administration Record (From admission, onward)    None        Operative/Procedure Notes (last 48 hours) (Notes from 8/3/2019 10:12 AM through 8/5/2019 10:12 AM)     No notes of this type exist for this encounter.           Physician Progress Notes (last 48 hours) (Notes from 8/3/2019 10:12 AM through 8/5/2019 10:12 AM)      Christiano Zapien MD at 8/5/2019  8:33 AM           LOS: 6 days   Patient Care Team:  Provider, No Known as PCP - General    Chief Complaint: No complaints.    Subjective     History of Present Illness    Subjective:  Symptoms:  Resolved.  No shortness of breath, malaise, cough, chest pain, weakness, headache, chest pressure, anorexia, diarrhea or anxiety.    Diet:  Adequate intake.  No nausea or vomiting.    Activity level: Normal.    Pain:  He complains of pain that is mild.  Pain is well controlled and requiring pain medication.        Objective     Vital Signs  Temp:  [97.8 °F (36.6 °C)-99.5 °F (37.5 °C)] 98.7 °F (37.1 °C)  Heart Rate:  [] 98  Resp:  [18] 18  BP: (109-142)/(67-95) 134/78  Body mass index is 37.43  "kg/m².    Intake/Output Summary (Last 24 hours) at 8/5/2019 0833  Last data filed at 8/5/2019 0738  Gross per 24 hour   Intake 1862 ml   Output 950 ml   Net 912 ml     I/O this shift:  In: 722 [P.O.:580; I.V.:142]  Out: -     Objective:  General Appearance:  Comfortable.    Vital signs: (most recent): Blood pressure 134/78, pulse 98, temperature 98.7 °F (37.1 °C), temperature source Oral, resp. rate 18, height 182 cm (71.65\"), weight 124 kg (273 lb 5.9 oz), SpO2 94 %.  Vital signs are normal.  No fever.    Output: Producing urine and producing stool.    HEENT: Normal HEENT exam.    Lungs:  He is not in respiratory distress.  No stridor.  There are rales.  No decreased breath sounds, wheezes or rhonchi.    Heart: Normal rate.  Regular rhythm.  No murmur, gallop or friction rub.   Chest: Symmetric chest wall expansion. Chest wall tenderness present.  (Sternum stable to palpation.  Incision clean, dry, and intact.)  Abdomen: Abdomen is soft.  Bowel sounds are normal.   There is no abdominal tenderness.     Extremities: Normal range of motion.    Pulses: Distal pulses are intact.    Neurological: Patient is alert and oriented to person, place and time.  GCS score is 15.    Pupils:  Pupils are equal, round, and reactive to light.    Skin:  Warm and dry.  No rash, ecchymosis or cyanosis.             Results Review:         WBC WBC   Date Value Ref Range Status   08/05/2019 14.89 (H) 3.40 - 10.80 10*3/mm3 Final   08/04/2019 15.43 (H) 3.40 - 10.80 10*3/mm3 Final   08/03/2019 17.78 (H) 3.40 - 10.80 10*3/mm3 Final      HGB Hemoglobin   Date Value Ref Range Status   08/05/2019 10.6 (L) 13.0 - 17.7 g/dL Final   08/04/2019 10.2 (L) 13.0 - 17.7 g/dL Final   08/03/2019 10.4 (L) 13.0 - 17.7 g/dL Final      HCT Hematocrit   Date Value Ref Range Status   08/05/2019 33.9 (L) 37.5 - 51.0 % Final   08/04/2019 33.4 (L) 37.5 - 51.0 % Final   08/03/2019 33.1 (L) 37.5 - 51.0 % Final      Platlets No results found for: LABPLAT     PT/INR:  " No results found for: PROTIME/No results found for: INR    Sodium Sodium   Date Value Ref Range Status   08/05/2019 135 (L) 136 - 145 mmol/L Final   08/04/2019 140 136 - 145 mmol/L Final   08/03/2019 137 136 - 145 mmol/L Final      Potassium Potassium   Date Value Ref Range Status   08/05/2019 3.5 3.5 - 5.2 mmol/L Final   08/04/2019 3.6 3.5 - 5.2 mmol/L Final   08/04/2019 3.5 3.5 - 5.2 mmol/L Final   08/03/2019 4.1 3.5 - 5.2 mmol/L Final   08/03/2019 3.6 3.5 - 5.2 mmol/L Final      Chloride Chloride   Date Value Ref Range Status   08/05/2019 99 98 - 107 mmol/L Final   08/04/2019 103 98 - 107 mmol/L Final   08/03/2019 101 98 - 107 mmol/L Final      Bicarbonate No results found for: PLASMABICARB   BUN BUN   Date Value Ref Range Status   08/05/2019 29 (H) 6 - 20 mg/dL Final   08/04/2019 33 (H) 6 - 20 mg/dL Final   08/03/2019 25 (H) 6 - 20 mg/dL Final      Creatinine Creatinine   Date Value Ref Range Status   08/05/2019 0.93 0.76 - 1.27 mg/dL Final   08/04/2019 1.05 0.76 - 1.27 mg/dL Final   08/03/2019 1.19 0.76 - 1.27 mg/dL Final      Calcium Calcium   Date Value Ref Range Status   08/05/2019 8.5 (L) 8.6 - 10.5 mg/dL Final   08/04/2019 8.4 (L) 8.6 - 10.5 mg/dL Final   08/03/2019 8.8 8.6 - 10.5 mg/dL Final      Magnesium No results found for: MG     pH No results found for: PHART   pO2 No results found for: PO2ART   pCO2 No results found for: DCF1RMJ   HCO3 No results found for: NNR7SPD       amiodarone 400 mg Oral Q12H   aspirin 325 mg Oral Daily   atorvastatin 40 mg Oral Nightly   enoxaparin 40 mg Subcutaneous Q24H   furosemide 40 mg Oral Daily   guaiFENesin 1,200 mg Oral Q12H   insulin lispro 0-7 Units Subcutaneous 4x Daily With Meals & Nightly   magnesium sulfate 2 g Intravenous Once   metoprolol tartrate 12.5 mg Oral Q12H   mupirocin  Each Nare BID   pantoprazole 40 mg Oral Q AM   piperacillin-tazobactam 4.5 g Intravenous Q8H   potassium chloride 20 mEq Oral Daily   sennosides-docusate sodium 2 tablet Oral Nightly  "         Medication Review:     Assessment/Plan     Patient Active Problem List   Diagnosis Code   • CAD (coronary artery disease) I25.10   • Coronary artery disease involving native coronary artery of native heart with unstable angina pectoris (CMS/[facility]) I25.110       Assessment & Plan    54-year-old status post urgent coronary bypass x5, postop day #4.    CV stable.  Pulmonary toileting.  Mobilizing well.  Oral diet as tolerated.  Oral diuretic initiated with potassium supplementation.  Replace magnesium this morning.  Afebrile.  Borderline leukocytosis but improving.  Questionable aspiration episode immediately postop; I would like to continue him on broad-spectrum antibiotics to complete 10 days of therapy.    Okay for discharge today.    Christiano Zapien MD  08/05/19  8:33 AM          Electronically signed by Christiano Zapien MD at 8/5/2019  8:37 AM     Tee Tong MD at 8/5/2019  7:31 AM          CC: MI    Interval History:   Feels good no real complaints.    Vital Signs  Temp:  [97.8 °F (36.6 °C)-99.5 °F (37.5 °C)] 99.5 °F (37.5 °C)  Heart Rate:  [] 97  Resp:  [17-18] 18  BP: (109-142)/(67-95) 122/83    Intake/Output Summary (Last 24 hours) at 8/5/2019 0731  Last data filed at 8/5/2019 0703  Gross per 24 hour   Intake 1382 ml   Output 950 ml   Net 432 ml     Flowsheet Rows      First Filed Value   Admission Height  182.9 cm (72\") Documented at 08/01/2019 1215   Admission Weight  126 kg (278 lb) Documented at 07/30/2019 1600          PHYSICAL EXAM:  General: No acute distress  Resp:NL Rate, unlabored, mentioned basis.  CV:NL rate and rhythm, NL PMI, Nl S1 and S2, no Murmur, no gallop, no rub, No JVD. Normal pedal pulses  ABD:Nl sounds, no masses or tenderness, nondistended, no guarding or rebound  Neuro: alert,cooperative and oriented  Extr: No edema or cyanosis, moves all extremities      Results Review:    Results from last 7 days   Lab Units 08/05/19  0305   SODIUM mmol/L 135*   POTASSIUM " mmol/L 3.5   CHLORIDE mmol/L 99   CO2 mmol/L 22.2   BUN mg/dL 29*   CREATININE mg/dL 0.93   GLUCOSE mg/dL 134*   CALCIUM mg/dL 8.5*       Results from last 7 days   Lab Units 08/05/19  0305   WBC 10*3/mm3 14.89*   HEMOGLOBIN g/dL 10.6*   HEMATOCRIT % 33.9*   PLATELETS 10*3/mm3 307         I reviewed the patient's new clinical results.  I personally viewed and interpreted the patient's EKG/Telemetry data        Medication Review:   Meds reviewed      propofol 5-50 mcg/kg/min Last Rate: Stopped (08/02/19 0030)       Assessment/Plan  1. Acute inferior STEMI, severe three vessel disease   initial EF 30% s/p CABG x 5 on 8/1/2019 with LIMA to distal LAD, saphenous vein to diagonal, saphenous vein to OM 2, saphenous vein sequential to PDA and left ventricular branch.   Echocardiogram this admission left ventricular ejection fraction approximately 50% but poor quality we just repeat this as an outpatient in few months.     2. nonsustained v tach -monitor, is on 400 twice daily oral amnio for a load.  Decrease the dose of discharge.  3. DM.  Continue to follow blood sugars.  4. SUZAN back to near normal.  5. Obese BM<I 37.08  6. CHAD-per pulmonary.  7.HTN-low-dose metoprolol 12.5 twice daily  8.hyperlipidemia: Continue high intensity statin 9.BIlateral mild carotid stenosis on duplex this admit            Tee Tong MD  08/05/19  7:31 AM          Electronically signed by Tee Tong MD at 8/5/2019  7:39 AM     Yelena Vega MD at 8/4/2019 10:35 PM                                      Yelena Vega MD                          [phone]      Patient ID:    Name:  Marco Singletary    MRN:  [id number]    1965   54 y.o.  male            Patient Care Team:  Provider, No Known as PCP - General    CC/ Reason for visit: F/u respiratory failure    Subjective:   On Oxygen 3L min. Has productive cough but cannot tell the color of the phlegm. No fever    ROS:   Cardiovascular:Sternal chest pain.  Worse with cough and  deep breathing.  Legs edema.  No palpitation.    Objective     Vital Signs past 24hrs    BP range: BP: (109-142)/(67-95) 142/95  Pulse range: Heart Rate:  [] 97  Resp rate range: Resp:  [16-18] 18  Temp range: Temp (24hrs), Av.5 °F (36.9 °C), Min:97.8 °F (36.6 °C), Max:99.5 °F (37.5 °C)      Ventilator/Non-Invasive Ventilation Settings (From admission, onward)    None          Device (Oxygen Therapy): room air FiO2 (%): 39 %     124 kg (273 lb 5.9 oz); Body mass index is 37.43 kg/m².      Intake/Output Summary (Last 24 hours) at 2019  Last data filed at 2019  Gross per 24 hour   Intake 740 ml   Output 1270 ml   Net -530 ml       PHYSICAL EXAM   Constitutional: Middle aged morbidly obese, in no acute distress  Head: - NCAT  Eyes: No pallor, Anicteric conjunctiva, EOMI.  ENMT:   Gorman and Mallampati score 4.  Moist mucous membrane.  NECK: Trachea midline.  Large neck.  No thyromegaly, no palpable cervical lymphadenopathy  Heart: RRR, no murmur. 2+ pedal edema, sternotomy with bandage, he has no chest tube   Lungs: JAIMEE +, decreased breath sounds at the bases, no wheezes.  scattered crackles  Abdomen: Soft. No tenderness, guarding or rigidity. No palpable masses  Extremities: Extremities warm and well perfused. No cyanosis/ clubbing.    Neuro: Awake and interactive, alert, oriented x3.  Strength 5/5 in arms.  Psych: Mood and affect unable to obtain    Scheduled meds:      amiodarone 400 mg Oral Q12H   aspirin 325 mg Oral Daily   atorvastatin 40 mg Oral Nightly   enoxaparin 40 mg Subcutaneous Q24H   guaiFENesin 1,200 mg Oral Q12H   insulin lispro 0-7 Units Subcutaneous 4x Daily With Meals & Nightly   metoprolol tartrate 12.5 mg Oral Q12H   mupirocin  Each Nare BID   pantoprazole 40 mg Oral Q AM   piperacillin-tazobactam 4.5 g Intravenous Q8H   sennosides-docusate sodium 2 tablet Oral Nightly       IV meds:                          propofol 5-50 mcg/kg/min Last Rate: Stopped (19 0030)  "                   Diagnostic imaging:  I personally and Independently reviewed and interpreted the following images:  CXR 8/20/2019 compared to 8/2/2019:  Obesity.  Bilateral pulmonary vascular congestion.  Cardiomegaly.      CXR 8/4/19: cardiomegaly. Mild left pleural effusion    Assessment    1. Acute ST elevation MI, CAD 3vd s/p CABG 8/1   2. Postop hypoxia: combination of pulmonary edema and atelectasis  3. Pulmonary vascular congestion  4. Mild left pleural effusion  5. Acute diastolic CHF  6. Postop hypotension, resolved  7. Leukocytosis, improving  8. Undiagnosed obstructive sleep apnea: Snoring, apnea and excessive daytime sleepiness  9. Morbid obesity  10. Pulmonary HTN, RVPS 41, 2ary to diastolic CHF and probable CHAD/ possible OHS    PLAN:  · IS- encouraged to use  · Diuresis per primary/cardiology  · Outpatient evaluation for CHAD. Offered CPAP at night while inpatient but he declined.  · Oxygen by NC- titrate  · On AB for possible aspiration pneumonia per primary          Yelena Vega MD  8/4/2019    Electronically signed by Yelena Vega MD at 8/4/2019 10:42 PM     Melani Vila MD at 8/4/2019 10:46 AM          CC: CAD CABG follow up    Interval History:    Pt off the floor. Wife states he looks and feels much better, is able to breathe.     Vital Signs  Temp:  [98.4 °F (36.9 °C)-98.6 °F (37 °C)] 98.6 °F (37 °C)  Heart Rate:  [] 96  Resp:  [16-18] 17  BP: (104-121)/(67-84) 109/67    Intake/Output Summary (Last 24 hours) at 8/4/2019 1046  Last data filed at 8/4/2019 0920  Gross per 24 hour   Intake 760 ml   Output 920 ml   Net -160 ml     Flowsheet Rows      First Filed Value   Admission Height  182.9 cm (72\") Documented at 08/01/2019 1215   Admission Weight  126 kg (278 lb) Documented at 07/30/2019 1600            Results Review:    Results from last 7 days   Lab Units 08/04/19  0351   SODIUM mmol/L 140   POTASSIUM mmol/L 3.5   CHLORIDE mmol/L 103   CO2 mmol/L 22.2   BUN mg/dL 33*   CREATININE " mg/dL 1.05   GLUCOSE mg/dL 153*   CALCIUM mg/dL 8.4*     Results from last 7 days   Lab Units 07/31/19  0609 07/30/19  2215 07/30/19  1856   TROPONIN T ng/mL 1.830* 1.690* 1.140*     Results from last 7 days   Lab Units 08/04/19  0351   WBC 10*3/mm3 15.43*   HEMOGLOBIN g/dL 10.2*   HEMATOCRIT % 33.4*   PLATELETS 10*3/mm3 245     Results from last 7 days   Lab Units 08/02/19  0303 08/01/19  1212 08/01/19  0436 07/31/19  2110  07/30/19  1646   INR  1.27* 1.37*  --   --   --  1.05   APTT seconds  --  29.0 64.0* 57.1*   < > 41.5*    < > = values in this interval not displayed.     Results from last 7 days   Lab Units 07/30/19  1646   CHOLESTEROL mg/dL 213*     Results from last 7 days   Lab Units 08/02/19  0303   MAGNESIUM mg/dL 2.5     Results from last 7 days   Lab Units 07/30/19  1646   CHOLESTEROL mg/dL 213*   TRIGLYCERIDES mg/dL 179*   HDL CHOL mg/dL 36*   LDL CHOL mg/dL 141*     I reviewed the patient's new clinical results.  I personally viewed and interpreted the patient's EKG/Telemetry data- NSR        Medication Review:   Meds reviewed      propofol 5-50 mcg/kg/min Last Rate: Stopped (08/02/19 0030)       Assessment/Plan  1. Acute inferior STEMI, severe three vessel disease  EF 30% s/p CABG x 5 on 8/1/2019 with LIMA to distal LAD, saphenous vein to diagonal, saphenous vein to OM 2, saphenous vein sequential to PDA and left ventricular branch.   -TTE pending    2. nonsustained v tach -monitor, is on 400 twice daily oral amnio for a load  3. DM   4. SUZAN   5. Obese BM<I 37.08  6. CHAD-  7.HTN-low-dose metoprolol 12.5 twice daily  8.hyperlipidemia: Continue high intensity statin 9.BIlateral carotid stenosis on duplex this admit    Unable to assess patient today as he is off the floor. Reportedly, he is feeling better. VSS. No changes. Echo pending for systolic function.     Melani Vila MD  08/04/19  10:46 AM          Electronically signed by Melani Vila MD at 8/4/2019 10:48 AM     Khadra Tobias APRN at  2019  7:54 AM     Attestation signed by Ramana Jackson MD at 2019  9:20 AM    I have reviewed the documentation above and agree.                  · Severe 3 vessel CAD, STEMI---s/p urgernt CABGx5 POD#3(Dr. Zapien)  · HTN-- stable  · HLD-- statin    · Obesity   · Likely undiagnosed sleep apnea   · Leukocytosis--- WBC 21.8, down from 34 immediate post-op, broad spectrum abx started with concern for aspiration after vomiting while on vent         Mobilize and aggressive pulm toilet.   Pulm following.   IV diurese.  Discontinue AV wires  On 4L NC, wean as able.  Increase activity. May be ready for discharge in the next few days once O2 requirements decrease.       KATHY Grace  19  7:56 AM           Electronically signed by Ramana Jackson MD at 2019  9:20 AM     Yelena Vega MD at 8/3/2019  6:48 PM                                      Yelena Vega MD                          [phone]      Patient ID:    Name:  Marco Singletary    MRN:  [id number]    1965   54 y.o.  male            Patient Care Team:  Provider, No Known as PCP - General    CC/ Reason for visit: F/u respiratory failure    Subjective:   I reviewed the admission note, progress notes, PMH, PSH, Family hx, social history, imagings and prior records on this admission, summarized the finding in my note and formulated a transition of care plan.     On oxygen 3 L/min.  He does not use oxygen at home.  He has chest pain during deep inspiration and cough.  No sputum production. Low grade temp 100.2 last night.     ROS:   Cardiovascular:Sternal chest pain.  Worse with cough and deep breathing.  Legs edema.  No palpitation.    Objective     Vital Signs past 24hrs    BP range: BP: (104-119)/(70-82) 115/75  Pulse range: Heart Rate:  [] 101  Resp rate range: Resp:  [16-18] 16  Temp range: Temp (24hrs), Av.7 °F (37.1 °C), Min:98.1 °F (36.7 °C), Max:99.3 °F (37.4 °C)      Ventilator/Non-Invasive Ventilation Settings  (From admission, onward)    None          Device (Oxygen Therapy): nasal cannula FiO2 (%): 39 %     124 kg (273 lb 8 oz); Body mass index is 37.09 kg/m².      Intake/Output Summary (Last 24 hours) at 8/3/2019 1848  Last data filed at 8/3/2019 1817  Gross per 24 hour   Intake 680 ml   Output 1005 ml   Net -325 ml       PHYSICAL EXAM   Constitutional: Middle aged morbidly obese, in no acute distress  Head: - NCAT  Eyes: No pallor, Anicteric conjunctiva, EOMI.  ENMT:   Gorman and Mallampati score 4.  Moist mucous membrane.  NECK: Trachea midline.  Large neck.  No thyromegaly, no palpable cervical lymphadenopathy  Heart: RRR, no murmur. 2+ pedal edema, sternotomy with bandage, he has no chest tube   Lungs: JAIMEE +, decreased breath sounds at the bases, no wheezes.  Bilateral lower lobe crackles  Abdomen: Soft. No tenderness, guarding or rigidity. No palpable masses  Extremities: Extremities warm and well perfused. No cyanosis/ clubbing.    Neuro: Awake and interactive, alert, oriented x3.  Strength 5/5 in arms.  Psych: Mood and affect unable to obtain    Scheduled meds:      amiodarone 400 mg Oral Q12H   aspirin 325 mg Oral Daily   atorvastatin 40 mg Oral Nightly   chlorhexidine 15 mL Mouth/Throat Q12H   enoxaparin 40 mg Subcutaneous Q24H   guaiFENesin 1,200 mg Oral Q12H   insulin lispro 0-7 Units Subcutaneous 4x Daily With Meals & Nightly   metoprolol tartrate 12.5 mg Oral Q12H   mupirocin  Each Nare BID   pantoprazole 40 mg Oral Q AM   piperacillin-tazobactam 4.5 g Intravenous Q8H   sennosides-docusate sodium 2 tablet Oral Nightly       IV meds:                          propofol 5-50 mcg/kg/min Last Rate: Stopped (08/02/19 0030)   Diagnostic imaging:  I personally and Independently reviewed and interpreted the following images:  CXR 8/20/2019 compared to 8/2/2019:  Obesity.  Bilateral pulmonary vascular congestion.  Cardiomegaly.      Assessment    11. Acute ST elevation MI, CAD 3vd s/p CABG 8/1   12. Postop hypoxia:  "combination of pulmonary edema and atelectasis  13. Pulmonary vascular congestion  14. Postop hypotension, resolved  15. Leukocytosis with no signs of infection  16. Undiagnosed obstructive sleep apnea: Snoring, apnea and excessive daytime sleepiness  17. Morbid obesity    PLAN:  · IS- encouraged to use  · PRN diuresis. Echo  · Outpatient evaluation for CHAD  · Oxygen by SAM Vega MD  8/3/2019    Electronically signed by Yelena Vega MD at 8/3/2019 11:44 PM     Melani Vila MD at 8/3/2019 10:42 AM          CC: CAD CABG follow up    Interval History:    Lots of pain this morning, pretty unable to tolerate small movements.  Cannot lean forward without assistance.  Taking shallow breathing of breath and is tachypneic.    Vital Signs  Temp:  [98.1 °F (36.7 °C)-99.3 °F (37.4 °C)] 98.5 °F (36.9 °C)  Heart Rate:  [88-99] 99  Resp:  [16-18] 18  BP: (104-117)/(70-82) 116/82    Intake/Output Summary (Last 24 hours) at 8/3/2019 1042  Last data filed at 8/3/2019 0826  Gross per 24 hour   Intake 920 ml   Output 1125 ml   Net -205 ml     Flowsheet Rows      First Filed Value   Admission Height  182.9 cm (72\") Documented at 08/01/2019 1215   Admission Weight  126 kg (278 lb) Documented at 07/30/2019 1600          PHYSICAL EXAM:  General: No acute distress  Resp: Tachypnea, shallow breathing  CV:NL  mildly tachycardic, regular rhythm, NL PMI, Nl S1 and S2, no Murmur, no gallop, no rub, No JVD. Normal pedal pulses  ABD:Nl sounds, no masses or tenderness, nondistended, no guarding or rebound  Neuro: alert,cooperative and oriented  Extr: No edema or cyanosis, moves all extremities      Results Review:    Results from last 7 days   Lab Units 08/03/19  0349   SODIUM mmol/L 137   POTASSIUM mmol/L 3.6   CHLORIDE mmol/L 101   CO2 mmol/L 23.4   BUN mg/dL 25*   CREATININE mg/dL 1.19   GLUCOSE mg/dL 172*   CALCIUM mg/dL 8.8     Results from last 7 days   Lab Units 07/31/19  0609 07/30/19  2215 07/30/19  1856   TROPONIN T " ng/mL 1.830* 1.690* 1.140*     Results from last 7 days   Lab Units 08/03/19  0349   WBC 10*3/mm3 17.78*   HEMOGLOBIN g/dL 10.4*   HEMATOCRIT % 33.1*   PLATELETS 10*3/mm3 190     Results from last 7 days   Lab Units 08/02/19  0303 08/01/19  1212 08/01/19  0436 07/31/19  2110  07/30/19  1646   INR  1.27* 1.37*  --   --   --  1.05   APTT seconds  --  29.0 64.0* 57.1*   < > 41.5*    < > = values in this interval not displayed.     Results from last 7 days   Lab Units 07/30/19  1646   CHOLESTEROL mg/dL 213*     Results from last 7 days   Lab Units 08/02/19  0303   MAGNESIUM mg/dL 2.5     Results from last 7 days   Lab Units 07/30/19  1646   CHOLESTEROL mg/dL 213*   TRIGLYCERIDES mg/dL 179*   HDL CHOL mg/dL 36*   LDL CHOL mg/dL 141*     I reviewed the patient's new clinical results.  I personally viewed and interpreted the patient's EKG/Telemetry data- NSR        Medication Review:   Meds reviewed      propofol 5-50 mcg/kg/min Last Rate: Stopped (08/02/19 0030)       Assessment/Plan  1. Acute inferior STEMI, severe three vessel disease  EF 30% s/p CABG x 5 on 8/1/2019 with LIMA to distal LAD, saphenous vein to diagonal, saphenous vein to OM 2, saphenous vein sequential to PDA and left ventricular branch.   -Intraoperative ERICA showed moderately reduced systolic function, EF 40%.  Would repeat echocardiogram to assess true function.  -Hemodynamically stable.  Lots of incisional pain which is impeding his respirations.  2. nonsustained v tach -monitor, is on 400 twice daily oral amnio for a load  3. DM   4. SUZAN   5. Obese BM<I 37.08  6. CHAD-  7.HTN-low-dose metoprolol 12.5 twice daily  8.hyperlipidemia: Continue high intensity statin 9.BIlateral carotid stenosis on duplex this admit    Repeat echo, plan GDMT based on TTE LV function assessment    Melani Vila MD  08/03/19  10:42 AM          Electronically signed by Melani Vila MD at 8/3/2019 10:45 AM

## 2019-08-05 NOTE — DISCHARGE PLACEMENT REQUEST
"Marco Chase (54 y.o. Male)     Date of Birth Social Security Number Address Home Phone MRN    1965  [address] [id number] [phone] [id number]    Mandaeism Marital Status          None        Admission Date Admission Type Admitting Provider Attending Provider Department, Room/Bed    7/30/19 Urgent Christiano Zapien MD Khan, Ahmad Aftab, MD [facility] CARDIOVASC UNIT, 2229/1    Discharge Date Discharge Disposition Discharge Destination                       Attending Provider:  Christiano Zapien MD    Allergies:  Bee Venom    Isolation:  None   Infection:  None   Code Status:  CPR    Ht:  182 cm (71.65\")   Wt:  124 kg (273 lb 5.9 oz)    Admission Cmt:  None   Principal Problem:  Coronary artery disease involving native coronary artery of native heart with unstable angina pectoris (CMS/[facility]) [I25.110] More...                 Active Insurance as of 7/30/2019     Primary Coverage     Payor Plan Insurance Group Employer/Plan Group    [facility] [employer] [facility] [employer] [facility] [id number]     Payor Plan Address Payor Plan Phone Number Payor Plan Fax Number Effective Dates    [address] [phone]  1/1/2017 - None Entered    [facility] [address]       Subscriber Name Subscriber Birth Date Member ID       MARCO CHASE 1965 [id number]                 Emergency Contacts      (Rel.) Home Phone Work Phone Mobile Phone    Priscilla Chase (Spouse) -- -- [phone]              "

## 2019-08-05 NOTE — PROGRESS NOTES
Continued Stay Note  [facility]     Patient Name: Marco Singletary  MRN: [id number]  Today's Date: 8/5/2019    Admit Date: 7/30/2019    Discharge Plan     Row Name 08/05/19 1619       Plan    Plan  Home with [facility]    Plan Comments  Pt chose [facility] to follow him at discharge.  CCP called referral to Estrellita/Kurt  and she came to visit Pt at bedside and approved him for home health services.  Pt plans to d/c home with assistance of his spouse.  JESSICA ATKINSON/CCP        Discharge Codes    No documentation.             April Fishman RN

## 2019-08-05 NOTE — PROGRESS NOTES
"CC: MI    Interval History:   Feels good no real complaints.    Vital Signs  Temp:  [97.8 °F (36.6 °C)-99.5 °F (37.5 °C)] 99.5 °F (37.5 °C)  Heart Rate:  [] 97  Resp:  [17-18] 18  BP: (109-142)/(67-95) 122/83    Intake/Output Summary (Last 24 hours) at 8/5/2019 0731  Last data filed at 8/5/2019 0703  Gross per 24 hour   Intake 1382 ml   Output 950 ml   Net 432 ml     Flowsheet Rows      First Filed Value   Admission Height  182.9 cm (72\") Documented at 08/01/2019 1215   Admission Weight  126 kg (278 lb) Documented at 07/30/2019 1600          PHYSICAL EXAM:  General: No acute distress  Resp:NL Rate, unlabored, mentioned basis.  CV:NL rate and rhythm, NL PMI, Nl S1 and S2, no Murmur, no gallop, no rub, No JVD. Normal pedal pulses  ABD:Nl sounds, no masses or tenderness, nondistended, no guarding or rebound  Neuro: alert,cooperative and oriented  Extr: No edema or cyanosis, moves all extremities      Results Review:    Results from last 7 days   Lab Units 08/05/19  0305   SODIUM mmol/L 135*   POTASSIUM mmol/L 3.5   CHLORIDE mmol/L 99   CO2 mmol/L 22.2   BUN mg/dL 29*   CREATININE mg/dL 0.93   GLUCOSE mg/dL 134*   CALCIUM mg/dL 8.5*     Results from last 7 days   Lab Units 07/31/19  0609 07/30/19  2215 07/30/19  1856   TROPONIN T ng/mL 1.830* 1.690* 1.140*     Results from last 7 days   Lab Units 08/05/19  0305   WBC 10*3/mm3 14.89*   HEMOGLOBIN g/dL 10.6*   HEMATOCRIT % 33.9*   PLATELETS 10*3/mm3 307     Results from last 7 days   Lab Units 08/02/19  0303 08/01/19  1212 08/01/19  0436 07/31/19  2110  07/30/19  1646   INR  1.27* 1.37*  --   --   --  1.05   APTT seconds  --  29.0 64.0* 57.1*   < > 41.5*    < > = values in this interval not displayed.     Results from last 7 days   Lab Units 07/30/19  1646   CHOLESTEROL mg/dL 213*     Results from last 7 days   Lab Units 08/02/19  0303   MAGNESIUM mg/dL 2.5     Results from last 7 days   Lab Units 07/30/19  1646   CHOLESTEROL mg/dL 213*   TRIGLYCERIDES mg/dL 179* "   HDL CHOL mg/dL 36*   LDL CHOL mg/dL 141*     I reviewed the patient's new clinical results.  I personally viewed and interpreted the patient's EKG/Telemetry data        Medication Review:   Meds reviewed      propofol 5-50 mcg/kg/min Last Rate: Stopped (08/02/19 0030)       Assessment/Plan  1. Acute inferior STEMI, severe three vessel disease  initial EF 30% s/p CABG x 5 on 8/1/2019 with LIMA to distal LAD, saphenous vein to diagonal, saphenous vein to OM 2, saphenous vein sequential to PDA and left ventricular branch.   Echocardiogram this admission left ventricular ejection fraction approximately 50% but poor quality we just repeat this as an outpatient in few months.     2. nonsustained v tach -monitor, is on 400 twice daily oral amnio for a load.  Decrease the dose of discharge.  3. DM.  Continue to follow blood sugars.  4. SUZAN back to near normal.  5. Obese BM<I 37.08  6. CHAD-per pulmonary.  7.HTN-low-dose metoprolol 12.5 twice daily  8.hyperlipidemia: Continue high intensity statin 9.BIlateral mild carotid stenosis on duplex this admit            Tee Tong MD  08/05/19  7:31 AM

## 2019-08-05 NOTE — PLAN OF CARE
Problem: Skin Injury Risk (Adult)  Goal: Skin Health and Integrity  Outcome: Ongoing (interventions implemented as appropriate)   08/05/19 0535   Skin Injury Risk (Adult)   Skin Health and Integrity making progress toward outcome       Problem: Patient Care Overview  Goal: Plan of Care Review  Outcome: Ongoing (interventions implemented as appropriate)   08/05/19 0535   Coping/Psychosocial   Plan of Care Reviewed With patient;spouse   Plan of Care Review   Progress improving   OTHER   Outcome Summary Vitals stable. No falls. Pt c/o pain in midsternal incision, medicated per MAR. Placed on 2L with sleep. IV antibiotics continued. Pt resting comfortably. Monitoring closely.        Problem: Fall Risk (Adult)  Goal: Absence of Fall  Outcome: Ongoing (interventions implemented as appropriate)   08/05/19 0535   Fall Risk (Adult)   Absence of Fall making progress toward outcome       Problem: Pain, Acute (Adult)  Goal: Acceptable Pain Control/Comfort Level  Outcome: Ongoing (interventions implemented as appropriate)   08/05/19 0535   Pain, Acute (Adult)   Acceptable Pain Control/Comfort Level making progress toward outcome       Problem: Cardiac Surgery (Adult)  Goal: Signs and Symptoms of Listed Potential Problems Will be Absent, Minimized or Managed (Cardiac Surgery)  Outcome: Outcome(s) achieved Date Met: 08/05/19 08/05/19 0535   Goal/Outcome Evaluation   Problems Assessed (Cardiac Surgery) all   Problems Present (Cardiac Surgery) pain;respiratory compromise;situational response

## 2019-08-05 NOTE — PROGRESS NOTES
" LOS: 6 days   Patient Care Team:  Provider, No Known as PCP - General    Chief Complaint: No complaints.    Subjective     History of Present Illness    Subjective:  Symptoms:  Resolved.  No shortness of breath, malaise, cough, chest pain, weakness, headache, chest pressure, anorexia, diarrhea or anxiety.    Diet:  Adequate intake.  No nausea or vomiting.    Activity level: Normal.    Pain:  He complains of pain that is mild.  Pain is well controlled and requiring pain medication.        Objective     Vital Signs  Temp:  [97.8 °F (36.6 °C)-99.5 °F (37.5 °C)] 98.7 °F (37.1 °C)  Heart Rate:  [] 98  Resp:  [18] 18  BP: (109-142)/(67-95) 134/78  Body mass index is 37.43 kg/m².    Intake/Output Summary (Last 24 hours) at 8/5/2019 0833  Last data filed at 8/5/2019 0738  Gross per 24 hour   Intake 1862 ml   Output 950 ml   Net 912 ml     I/O this shift:  In: 722 [P.O.:580; I.V.:142]  Out: -     Objective:  General Appearance:  Comfortable.    Vital signs: (most recent): Blood pressure 134/78, pulse 98, temperature 98.7 °F (37.1 °C), temperature source Oral, resp. rate 18, height 182 cm (71.65\"), weight 124 kg (273 lb 5.9 oz), SpO2 94 %.  Vital signs are normal.  No fever.    Output: Producing urine and producing stool.    HEENT: Normal HEENT exam.    Lungs:  He is not in respiratory distress.  No stridor.  There are rales.  No decreased breath sounds, wheezes or rhonchi.    Heart: Normal rate.  Regular rhythm.  No murmur, gallop or friction rub.   Chest: Symmetric chest wall expansion. Chest wall tenderness present.  (Sternum stable to palpation.  Incision clean, dry, and intact.)  Abdomen: Abdomen is soft.  Bowel sounds are normal.   There is no abdominal tenderness.     Extremities: Normal range of motion.    Pulses: Distal pulses are intact.    Neurological: Patient is alert and oriented to person, place and time.  GCS score is 15.    Pupils:  Pupils are equal, round, and reactive to light.    Skin:  Warm and " dry.  No rash, ecchymosis or cyanosis.             Results Review:         WBC WBC   Date Value Ref Range Status   08/05/2019 14.89 (H) 3.40 - 10.80 10*3/mm3 Final   08/04/2019 15.43 (H) 3.40 - 10.80 10*3/mm3 Final   08/03/2019 17.78 (H) 3.40 - 10.80 10*3/mm3 Final      HGB Hemoglobin   Date Value Ref Range Status   08/05/2019 10.6 (L) 13.0 - 17.7 g/dL Final   08/04/2019 10.2 (L) 13.0 - 17.7 g/dL Final   08/03/2019 10.4 (L) 13.0 - 17.7 g/dL Final      HCT Hematocrit   Date Value Ref Range Status   08/05/2019 33.9 (L) 37.5 - 51.0 % Final   08/04/2019 33.4 (L) 37.5 - 51.0 % Final   08/03/2019 33.1 (L) 37.5 - 51.0 % Final      Platlets No results found for: LABPLAT     PT/INR:  No results found for: PROTIME/No results found for: INR    Sodium Sodium   Date Value Ref Range Status   08/05/2019 135 (L) 136 - 145 mmol/L Final   08/04/2019 140 136 - 145 mmol/L Final   08/03/2019 137 136 - 145 mmol/L Final      Potassium Potassium   Date Value Ref Range Status   08/05/2019 3.5 3.5 - 5.2 mmol/L Final   08/04/2019 3.6 3.5 - 5.2 mmol/L Final   08/04/2019 3.5 3.5 - 5.2 mmol/L Final   08/03/2019 4.1 3.5 - 5.2 mmol/L Final   08/03/2019 3.6 3.5 - 5.2 mmol/L Final      Chloride Chloride   Date Value Ref Range Status   08/05/2019 99 98 - 107 mmol/L Final   08/04/2019 103 98 - 107 mmol/L Final   08/03/2019 101 98 - 107 mmol/L Final      Bicarbonate No results found for: PLASMABICARB   BUN BUN   Date Value Ref Range Status   08/05/2019 29 (H) 6 - 20 mg/dL Final   08/04/2019 33 (H) 6 - 20 mg/dL Final   08/03/2019 25 (H) 6 - 20 mg/dL Final      Creatinine Creatinine   Date Value Ref Range Status   08/05/2019 0.93 0.76 - 1.27 mg/dL Final   08/04/2019 1.05 0.76 - 1.27 mg/dL Final   08/03/2019 1.19 0.76 - 1.27 mg/dL Final      Calcium Calcium   Date Value Ref Range Status   08/05/2019 8.5 (L) 8.6 - 10.5 mg/dL Final   08/04/2019 8.4 (L) 8.6 - 10.5 mg/dL Final   08/03/2019 8.8 8.6 - 10.5 mg/dL Final      Magnesium No results found for: MG      pH No results found for: PHART   pO2 No results found for: PO2ART   pCO2 No results found for: MFG1HTV   HCO3 No results found for: VTE6JSQ       amiodarone 400 mg Oral Q12H   aspirin 325 mg Oral Daily   atorvastatin 40 mg Oral Nightly   enoxaparin 40 mg Subcutaneous Q24H   furosemide 40 mg Oral Daily   guaiFENesin 1,200 mg Oral Q12H   insulin lispro 0-7 Units Subcutaneous 4x Daily With Meals & Nightly   magnesium sulfate 2 g Intravenous Once   metoprolol tartrate 12.5 mg Oral Q12H   mupirocin  Each Nare BID   pantoprazole 40 mg Oral Q AM   piperacillin-tazobactam 4.5 g Intravenous Q8H   potassium chloride 20 mEq Oral Daily   sennosides-docusate sodium 2 tablet Oral Nightly          Medication Review:     Assessment/Plan     Patient Active Problem List   Diagnosis Code   • CAD (coronary artery disease) I25.10   • Coronary artery disease involving native coronary artery of native heart with unstable angina pectoris (CMS/[facility]) I25.110       Assessment & Plan    54-year-old status post urgent coronary bypass x5, postop day #4.    CV stable.  Pulmonary toileting.  Mobilizing well.  Oral diet as tolerated.  Oral diuretic initiated with potassium supplementation.  Replace magnesium this morning.  Afebrile.  Borderline leukocytosis but improving.  Questionable aspiration episode immediately postop; I would like to continue him on broad-spectrum antibiotics to complete 10 days of therapy.    Okay for discharge today.    Christiano Zapien MD  08/05/19  8:33 AM

## 2019-08-05 NOTE — PROGRESS NOTES
Exercise Oximetry    Patient Name:Marco Singletary   MRN: [id number]   Date: 08/05/19             ROOM AIR BASELINE   SpO2% 95   Heart Rate  92   Blood Pressure      EXERCISE ON ROOM AIR SpO2% EXERCISE ON O2 @  LPM SpO2%   1 MINUTE 92 1 MINUTE    2 MINUTES 91 2 MINUTES    3 MINUTES 90 3 MINUTES    4 MINUTES 91 4 MINUTES    5 MINUTES 91 5 MINUTES    6 MINUTES 92 6 MINUTES               Distance Walked   Distance Walked   Dyspnea (Alanna Scale)   Dyspnea (Alanna Scale)   Fatigue (Alanna Scale)   Fatigue (Alanna Scale)   SpO2% Post Exercise  95 SpO2% Post Exercise   HR Post Exercise  107 HR Post Exercise   Time to Recovery   Time to Recovery     Comments:  Patient had no complaints of SOA.  Patient instructed to utilize pursed lip breathing when SpO2 decreased to 90%.

## 2019-08-05 NOTE — DISCHARGE INSTR - APPOINTMENTS
Call to make follow up appointment with Dr Zapien for 4-6 weeks from today.  [phone]    Call to make follow up appointment with Cardiologist/Nurse Practioner for 1 week from today    Call to make follow up appointment with Cardiologist for 1 month from today.    Call to make follow up appointment with your Primary Care Provider for 1 week from today.  [phone]

## 2019-08-05 NOTE — PLAN OF CARE
Problem: Patient Care Overview  Goal: Plan of Care Review  Outcome: Ongoing (interventions implemented as appropriate)   08/05/19 4587   Coping/Psychosocial   Plan of Care Reviewed With patient   Plan of Care Review   Progress improving   OTHER   Outcome Summary VSS. SR. RA. Up with standby assistance. No tubes or wires. Sternal precaution reviewed. No sutures present at time of D/C. Not agreeable to completion of overnight oximetry this hospitalization. Passed walking oximetry. Per pt, he's had 4 BMs since surgery. Denies pain or SOA. Home today, per CT surgery. CTM.

## 2019-08-05 NOTE — PROGRESS NOTES
Yelena Vega MD                          [phone]      Patient ID:    Name:  Marco Singletary    MRN:  [id number]    1965   54 y.o.  male            Patient Care Team:  Provider, No Known as PCP - General    CC/ Reason for visit: F/u respiratory failure    Subjective:   On Oxygen 3L min. Has productive cough but cannot tell the color of the phlegm. No fever    ROS:   Cardiovascular:Sternal chest pain.  Worse with cough and deep breathing.  Legs edema.  No palpitation.    Objective     Vital Signs past 24hrs    BP range: BP: (109-142)/(67-95) 142/95  Pulse range: Heart Rate:  [] 97  Resp rate range: Resp:  [16-18] 18  Temp range: Temp (24hrs), Av.5 °F (36.9 °C), Min:97.8 °F (36.6 °C), Max:99.5 °F (37.5 °C)      Ventilator/Non-Invasive Ventilation Settings (From admission, onward)    None          Device (Oxygen Therapy): room air FiO2 (%): 39 %     124 kg (273 lb 5.9 oz); Body mass index is 37.43 kg/m².      Intake/Output Summary (Last 24 hours) at 2019  Last data filed at 2019  Gross per 24 hour   Intake 740 ml   Output 1270 ml   Net -530 ml       PHYSICAL EXAM   Constitutional: Middle aged morbidly obese, in no acute distress  Head: - NCAT  Eyes: No pallor, Anicteric conjunctiva, EOMI.  ENMT:   Gorman and Mallampati score 4.  Moist mucous membrane.  NECK: Trachea midline.  Large neck.  No thyromegaly, no palpable cervical lymphadenopathy  Heart: RRR, no murmur. 2+ pedal edema, sternotomy with bandage, he has no chest tube   Lungs: JAIMEE +, decreased breath sounds at the bases, no wheezes.  scattered crackles  Abdomen: Soft. No tenderness, guarding or rigidity. No palpable masses  Extremities: Extremities warm and well perfused. No cyanosis/ clubbing.    Neuro: Awake and interactive, alert, oriented x3.  Strength 5/5 in arms.  Psych: Mood and affect unable to obtain    Scheduled meds:      amiodarone 400 mg Oral Q12H   aspirin 325 mg Oral Daily    atorvastatin 40 mg Oral Nightly   enoxaparin 40 mg Subcutaneous Q24H   guaiFENesin 1,200 mg Oral Q12H   insulin lispro 0-7 Units Subcutaneous 4x Daily With Meals & Nightly   metoprolol tartrate 12.5 mg Oral Q12H   mupirocin  Each Nare BID   pantoprazole 40 mg Oral Q AM   piperacillin-tazobactam 4.5 g Intravenous Q8H   sennosides-docusate sodium 2 tablet Oral Nightly       IV meds:                          propofol 5-50 mcg/kg/min Last Rate: Stopped (08/02/19 0030)       Data Review:      Results from last 7 days   Lab Units 08/04/19  1501 08/04/19  0351 08/03/19  1457 08/03/19  0349 08/02/19  0303  08/01/19  1212  07/30/19  1646   SODIUM mmol/L  --  140  --  137 142   < > 133*   < > 137   POTASSIUM mmol/L 3.6 3.5 4.1 3.6 4.0   < > 4.9   < > 4.4   CHLORIDE mmol/L  --  103  --  101 106   < > 99   < > 105   CO2 mmol/L  --  22.2  --  23.4 22.6   < > 21.6*   < > 21.2*   BUN mg/dL  --  33*  --  25* 14   < > 11   < > 9   CREATININE mg/dL  --  1.05  --  1.19 1.29*   < > 1.44*   < > 0.86   CALCIUM mg/dL  --  8.4*  --  8.8 8.3*   < > 9.1   < > 8.7   BILIRUBIN mg/dL  --   --   --   --   --   --   --   --  0.5   ALK PHOS U/L  --   --   --   --   --   --   --   --  63   ALT (SGPT) U/L  --   --   --   --   --   --   --   --  41   AST (SGOT) U/L  --   --   --   --   --   --   --   --  80*   GLUCOSE mg/dL  --  153*  --  172* 132*   < > 165*   < > 111*   WBC 10*3/mm3  --  15.43*  --  17.78* 21.83*   < > 34.32*   < > 12.15*   HEMOGLOBIN g/dL  --  10.2*  --  10.4* 10.9*   < > 11.2*   < > 13.2   HEMOGLOBIN, POC   --   --   --   --   --   --   --    < >  --    PLATELETS 10*3/mm3  --  245  --  190 198   < > 225   < > 260   INR   --   --   --   --  1.27*  --  1.37*  --  1.05   PROBNP pg/mL  --   --   --   --   --   --   --   --  152.5    < > = values in this interval not displayed.       Lab Results   Component Value Date    CALCIUM 8.4 (L) 08/04/2019    PHOS 3.7 08/02/2019             Results from last 7 days   Lab Units 08/02/19  3051  08/02/19  0020 08/01/19  1822 08/01/19  1509 08/01/19  1328 08/01/19  1133 08/01/19  1102  07/30/19  1645   PH, ARTERIAL pH units 7.442 7.436 7.405 7.367 7.284* 7.24* 7.27*   < > 7.404   PO2 ART mm Hg 86.5 98.6 86.8 87.3 123.8*  --   --   --  77.6*   PCO2, ARTERIAL mm Hg 31.8* 31.5* 35.9 39.0 43.1  --   --   --  35.9   HCO3 ART mmol/L 21.7* 21.2* 22.5 22.4 20.4*  --   --   --  22.4    < > = values in this interval not displayed.        Diagnostic imaging:  I personally and Independently reviewed and interpreted the following images:  CXR 8/20/2019 compared to 8/2/2019:  Obesity.  Bilateral pulmonary vascular congestion.  Cardiomegaly.      CXR 8/4/19: cardiomegaly. Mild left pleural effusion    Assessment    1. Acute ST elevation MI, CAD 3vd s/p CABG 8/1   2. Postop hypoxia: combination of pulmonary edema and atelectasis  3. Pulmonary vascular congestion  4. Mild left pleural effusion  5. Acute diastolic CHF  6. Postop hypotension, resolved  7. Leukocytosis, improving  8. Undiagnosed obstructive sleep apnea: Snoring, apnea and excessive daytime sleepiness  9. Morbid obesity  10. Pulmonary HTN, RVPS 41, 2ary to diastolic CHF and probable CHAD/ possible OHS    PLAN:  · IS- encouraged to use  · Diuresis per primary/cardiology  · Outpatient evaluation for CHAD. Offered CPAP at night while inpatient but he declined.  · Oxygen by NC- titrate  · On AB for possible aspiration pneumonia per primary          Yelena Vega MD  8/4/2019

## 2019-08-06 ENCOUNTER — READMISSION MANAGEMENT (OUTPATIENT)
Dept: CALL CENTER | Facility: HOSPITAL | Age: 54
End: 2019-08-06

## 2019-08-06 NOTE — OUTREACH NOTE
Prep Survey      Responses   Facility patient discharged from?  [facility]   Is patient eligible?  Yes   Discharge diagnosis  CABG this visit   Does the patient have one of the following disease processes/diagnoses(primary or secondary)?  Cardiothoracic surgery   Does the patient have Home health ordered?  Yes   What is the Home health agency?   [facility]   Is there a DME ordered?  No   Prep survey completed?  Yes          Sarahi Trejo RN

## 2019-08-06 NOTE — PROGRESS NOTES
Case Management Discharge Note    Final Note: Pt d/c home 8/5/19 with [facility]  scheduled to follow.    Destination      No service has been selected for the patient.      Durable Medical Equipment      No service has been selected for the patient.      Dialysis/Infusion      No service has been selected for the patient.      Home Medical Care - Selection Complete      Service Provider Request Status Selected Services Address Phone Number Fax Number    [facility] Selected Home Health Services [address] [place] [address] [place], [place] [id number] [phone]      Therapy      No service has been selected for the patient.      Community Resources      No service has been selected for the patient.             Final Discharge Disposition Code: 06 - home with home health care

## 2019-08-07 ENCOUNTER — READMISSION MANAGEMENT (OUTPATIENT)
Dept: CALL CENTER | Facility: HOSPITAL | Age: 54
End: 2019-08-07

## 2019-08-07 NOTE — OUTREACH NOTE
CT Surgery Week 1 Survey      Responses   Facility patient discharged from?  [facility]   Does the patient have one of the following disease processes/diagnoses(primary or secondary)?  Cardiothoracic surgery   Is there a successful TCM telephone encounter documented?  No   Week 1 attempt successful?  Yes   Call start time  0925   Call end time  0935   Discharge diagnosis  CABG this visit   Meds reviewed with patient/caregiver?  Yes   Is the patient having any side effects they believe may be caused by any medication additions or changes?  No   Does the patient have all medications related to this admission filled (includes all antibiotics, pain medications, cardiac medications, etc.)  Yes   Is the patient taking all medications as directed (includes completed medication regime)?  Yes   Does the patient have a primary care provider?   Yes   Does the patient have an appointment scheduled with their C/T surgeon?  Yes   Comments regarding PCP  NEW PCP visit on 8/12   Has the patient kept scheduled appointments due by today?  N/A   What is the Home health agency?   [facility]    Has home health visited the patient within 72 hours of discharge?  Yes   Psychosocial issues?  No   Did the patient receive a copy of their discharge instructions?  Yes   Nursing interventions  Reviewed instructions with patient   What is the patient's perception of their health status since discharge?  Improving   Nursing interventions  Nurse provided patient education   Is the patient/caregiver able to teach back normal signs of recovery?  Nausea and lack of appetite, Pain or discomfort at incisional site, Constipation   Nursing interventions  Reassured on normal signs of recovery   Is the patient /caregiver able to teach back basic post-op care?  Practice 'cough and deep breath', Take showers only when approved by MD-sponge bathe until then, No tub bath, swimming, or hot tub until instructed by MD, Keep incision areas clean, dry and protected,  Do not remove steri-strips, Lifting as instructed by MD in discharge instructions   Is the patient/caregiver able to teach back signs and symptoms of incisional infection?  Increased redness, swelling or pain at the incisonal site, Incisional warmth, Increased drainage or bleeding, Pus or odor from incision   Is the patient/caregiver able to teach back steps to recovery at home?  Set small, achievable goals for return to baseline health, Eat a well-balance diet, Rest and rebuild strength, gradually increase activity, Make a list of questions for surgeon's appointment   Is the patient/caregiver able to teach back the hierarchy of who to call/visit for symptoms/problems? PCP, Specialist, Home health nurse, Urgent Care, ED, 911  Yes   Week 1 call completed?  Yes            Miriam Sorto RN

## 2019-08-08 NOTE — PAYOR COMM NOTE
"Marco Chase (54 y.o. Male)     PLEASE SEE ATTACHED DC SUMMARY    REF#[id number]    THANK YOU    LYNNETTE ANDRADE LPN CCP          Date of Birth Social Security Number Address Home Phone MRN    1965  [address] [facility] [address] [phone] [id number]    Christianity Marital Status          None        Admission Date Admission Type Admitting Provider Attending Provider Department, Room/Bed    7/30/19 Urgent Christiano Zapien MD  [facility] CARDIOVASC UNIT, 2229/1    Discharge Date Discharge Disposition Discharge Destination        8/5/2019 Home-Health Care Svc              Attending Provider:  (none)   Allergies:  Bee Venom    Isolation:  None   Infection:  None   Code Status:  Prior    Ht:  182 cm (71.65\")   Wt:  124 kg (273 lb 5.9 oz)    Admission Cmt:  None   Principal Problem:  Coronary artery disease involving native coronary artery of native heart with unstable angina pectoris (CMS/Formerly [facility]) [I25.110] More...                 Active Insurance as of 7/30/2019     Primary Coverage     Payor Plan Insurance Group Employer/Plan Group    FamilyID PPO [id number]     Payor Plan Address Payor Plan Phone Number Payor Plan Fax Number Effective Dates    [id number] [phone]  1/1/2017 - None Entered    [place] [address]       Subscriber Name Subscriber Birth Date Member ID       MARCO CHASE 1965 [id number]                 Emergency Contacts      (Rel.) Home Phone Work Phone Mobile Phone    Priscilla Chase (Spouse) [phone] -- [phone]            Treatment Team     Provider Relationship Specialty Contact    Toyin Talavera, RN Registered Nurse --     Nasir Montelongo MD Consulting Physician Cardiology [phone]    Tee Tong MD Consulting Physician Cardiology [phone]    Christiano Zapien MD Surgeon, Physician of Record Cardiothoracic Surgery [phone]    Maren Escalante " Respiratory Therapist --     Palomo Tao MD Consulting Physician Pulmonary Disease [phone]    Zelda Dunne, RN Case Manager -- [phone]    April Fishman, RN Case Manager --     [facility] [place] --              Discharge Summary      Christiano Zapien MD at 8/5/2019  5:08 PM          Date of Admission:  7/30/2019  Date of Discharge:  8/5/2019    Discharge Diagnosis:   · Severe 3 vessel CAD now s/p CABG  · STEMI  · HTN  · HLD  · Obesity   · Likely CAHD  · Leukocytosis post-op  · Possible aspiration pneumonia     Presenting Problem/History of Present Illness:  · Severe 3 vessel CAD   · STEMI  · HTN  · HLD  · Obesity      Hospital Course:  Patient is a 54 y.o. male who was transferred to [facility] from [facility] on 7/30/19 after being diagnosed with a STEMI and severe 3 vessel CAD. He was seen and evaluated by Dr. Zapien and it was recommended that he undergo CABG. Pre-operatively he was evaluated by Pulmonology for possible sleep apnea. On 8/1/19 he was taken to the Operating Room and under general anesthesia and via median sternotomy underwent CABG x 5 by Dr. Zapien. Patient tolerated the procedure well and was transported to the Open Heart Recovery Unit in stable condition. Later that day, patient vomited while still intubated. His ET tube was suctioned and no vomitus was noted, but he was subsequently started on broad spectrum antibiotics for possible aspiration. Later that evening, patient was successfully weaned from the ventilator and extubated. On post-operative day #1 he was transferred out of the Open Heart Recovery Unit to the Cardiac step-down unit. The rest of his hospital course was uneventful and one of gradual improvement. On post-operative day #4, on 8/5/19, he was deemed stable for discharge home with [facility]. He was discharged home on Aspirin, stain, beta blocker, a 5 day course of Lasix/Potassium, and  Amoxicillin to complete a full 10 day course of antibiotics. He will have a CBC rechecked in 1 week and should have an outpatient evaluation for CHAD.         Procedures Performed:  Procedure(s) 8/1/19 by Dr. Zapien:  Urgent coronary artery bypass x5 with LIMA to distal LAD, saphenous vein to diagonal, saphenous vein to OM 2, saphenous vein sequential to PDA and left ventricular branch.  Left lower extremity endoscopic vein harvest.  PRP application to LIMA bed and sternum.  Intraoperative transesophageal echocardiogram.    Consults:   Consults     Date and Time Order Name Status Description    7/31/2019 1136 Inpatient Cardiology Consult Completed     7/30/2019 1642 Inpatient Pulmonology Consult Completed           Pertinent Test Results:    Lab Results   Component Value Date    WBC 14.89 (H) 08/05/2019    HGB 10.6 (L) 08/05/2019    HCT 33.9 (L) 08/05/2019    MCV 90.4 08/05/2019     08/05/2019      Lab Results   Component Value Date    GLUCOSE 134 (H) 08/05/2019    CALCIUM 8.5 (L) 08/05/2019     (L) 08/05/2019    K 3.5 08/05/2019    CO2 22.2 08/05/2019    CL 99 08/05/2019    BUN 29 (H) 08/05/2019    CREATININE 0.93 08/05/2019    EGFRIFNONA 85 08/05/2019    BCR 31.2 (H) 08/05/2019    ANIONGAP 13.8 08/05/2019     Lab Results   Component Value Date    INR 1.27 (H) 08/02/2019    PROTIME 15.6 (H) 08/02/2019       Condition on Discharge: Stable     Vital Signs  Temp:  [98.4 °F (36.9 °C)-99.5 °F (37.5 °C)] 98.4 °F (36.9 °C)  Heart Rate:  [] 92  Resp:  [16-18] 16  BP: (122-142)/(78-95) 135/87      Discharge Disposition  Home-Health Care Svc    Discharge Medications     Discharge Medications      New Medications      Instructions Start Date   amiodarone 400 MG tablet  Commonly known as:  PACERONE   200mg BID x 7 days then 200mg daily      amoxicillin-clavulanate 875-125 MG per tablet  Commonly known as:  AUGMENTIN   1 tablet, Oral, Every 12 Hours Scheduled      aspirin 325 MG EC tablet   325 mg, Oral, Daily    Start Date:  8/6/2019     atorvastatin 40 MG tablet  Commonly known as:  LIPITOR   40 mg, Oral, Nightly      furosemide 40 MG tablet  Commonly known as:  LASIX   40 mg, Oral, Daily   Start Date:  8/6/2019     guaiFENesin 600 MG 12 hr tablet  Commonly known as:  MUCINEX   1,200 mg, Oral, Every 12 Hours Scheduled      HYDROcodone-acetaminophen 5-325 MG per tablet  Commonly known as:  NORCO   1 tablet, Oral, Every 4 Hours PRN      metoprolol tartrate 25 MG tablet  Commonly known as:  LOPRESSOR   12.5 mg, Oral, Every 12 Hours Scheduled      potassium chloride 10 MEQ CR capsule  Commonly known as:  MICRO-K   20 mEq, Oral, Daily   Start Date:  8/6/2019            Discharge Diet: Healthy heart    Activity at Discharge:    · No driving x 2 weeks or while taking narcotic pain medication  · No lifting > 10 pounds until cleared by surgeon  · Ambulate 10 minutes at least 3 times a day  · Clean incisions daily with antibacterial soap and water    Follow-up Appointments  No future appointments.  Additional Instructions for the Follow-ups that You Need to Schedule     Call MD With Problems / Concerns   As directed      Instructions:  Call office at [phone] for any drainage, increased redness, or fever over 100.5    Order Comments:  Instructions:  Call office at [phone] for any drainage, increased redness, or fever over 100.5          Discharge Follow-up with PCP   As directed       Currently Documented PCP:    Brianda, No Known    PCP Phone Number:    [phone]     Follow Up Details:  in 1 week         Discharge Follow-up with Specialty: Cardiologist KATHY/PA; 1 Week   As directed      Specialty:  Cardiologist APRN/PA    Follow Up:  1 Week    Follow Up Details:  bring all prescription bottles to appointment, call for appointment         Discharge Follow-up with Specified Provider: Cardiologist; 1 Month   As directed      To:  Cardiologist    Follow Up:  1 Month    Follow Up Details:  call for appointment, bring all  medication bottles to appointment         Discharge Follow-up with Specified Provider: Dr. Zapien   As directed      To:  Dr. Zapien    Follow Up Details:  4-6 weeks, bring all current medications to appointment         Referral to Home Health   As directed      Check CBC in 1 week with results to Dr. Zapien    Order Comments:  Check CBC in 1 week with results to Dr. Zapien     Face to Face Visit Date:  8/5/2019    Follow-up Provider for Plan of Care?:  I will be treating the patient on an ongoing basis.  Please send me the Plan of Care for signature.    Follow-up Provider:  CHRISTIANO ZAPIEN [[id number]]    Reason/Clinical Findings:  post-op CABG    Describe mobility limitations that make leaving home difficult:  weakness    Nursing/Therapeutic Services Requested:  Skilled Nursing    Skilled nursing orders:  Post CABG care    Frequency:  1 Week 1         CBC & Differential    Aug 12, 2019 (Approximate)      Home health to check CBC with results to Dr. Zapien    Order Comments:  Home health to check CBC with results to Dr. Zapien     Manual Differential:  No             **This Discharge Summary is being written for completion of medical records. I was not personally involved in this patient's discharge home from the hospital.        AUGUSTA Gar  08/05/19  5:09 PM              Electronically signed by Christiano Zapien MD at 8/5/2019  8:22 PM

## 2019-08-14 NOTE — PROGRESS NOTES
Date of Office Visit: 08/15/2019  Encounter Provider: KATHY Ojeda  Place of Service: [facility] CARDIOLOGY  Patient Name: Marco Singletary  :1965    Chief Complaint   Patient presents with   • Coronary Artery Disease   :     HPI: Marco Singletary is a 54 y.o. male  with history of coronary artery disease, myocardial infarction status post coronary artery bypass grafting x5, nonsustained ventricular tachycardia edema, diabetes mellitus, sleep apnea and obesity.      He presented with acute inferior STEMI was found to have severe three-vessel disease with an EF of 30%.  He had CABG x5 on 2019 with LIMA to distal LAD, saphenous vein to diagonal, saphenous vein to OM 2, saphenous vein sequential to PDA and left ventricular branch. Intra op ERICA EF 40 %.  He had some nonsustained ventricular tachycardia so he was treated with amiodarone and transition to p.o. amiodarone.  Before being extubated in recovery he vomited he was treated with broad-spectrum antibiotic for possible aspiration.  Later that evening he was able to be successfully extubated. he was found to have diabetes mellitus.  He was weaned off all vasopressors and was able to be started on beta-blockade.  His LDL was 179 and he was started on atorvastatin 40mg.  His carotid duplex showed mild bilateral stenosis. He had an uneventful course and was discharged on postoperative day 4 with [facility].He was discharged home on Aspirin, stain, beta blocker, a 5 day course of Lasix/Potassium, and Amoxicillin to complete a full 10 day course of antibiotics. He will have a CBC rechecked in 1 week by [facility] and should have an outpatient evaluation for CHAD.      His postoperative echocardiogram showed his ejection fraction up to 51-55% with mild concentric hypertrophy and no significant valvular issues.      He presents today for hospital discharge follow-up.  His CBC was checked on 2019 with hemoglobin  "value 12.6 (improved from 10.6 )and hematocrit 39.1.  He has completed antibiotic therapy.  He is walking 30 minutes - 1 hour daily.  He is taking his pain medication at night but none throughout the day.  He has some positional discomfort in the chest but otherwise no palpitations, no shortness of breath no edema no lightheadedness no fatigue.  Is tolerating all medication.  His blood pressure at home is been 120-130/70-.  His heart rate has been 80-90.  He is open to sleep study.  He is also interested in cardiac rehabilitation.  He is down to taking amiodarone once daily.      Allergies   Allergen Reactions   • Bee Venom Swelling       Past Medical History:   Diagnosis Date   • 3-vessel CAD     severe, now  s/p CABG   • GERD (gastroesophageal reflux disease)    • HLD (hyperlipidemia)    • Hypertension    • Leukocytosis     post-op   • Obesity    • STEMI (ST elevation myocardial infarction) (CMS/Formerly [facility])        Past Surgical History:   Procedure Laterality Date   • CORONARY ARTERY BYPASS GRAFT N/A 8/1/2019    Procedure: ERICA STERNOTOMY CORONARY ARTERY BYPASS GRAFT TIMES 5 USING LEFT INTERNAL MAMMARY ARTERY AND LEFT GREATER SAPHENOUS VEIN GRAFT PER ENDOSCOPIC VEIN HARVESTING AND PRP;  Surgeon: Christiano Zapien MD;  Location: [facility];  Service: Cardiothoracic   • HERNIA REPAIR           Family and social history reviewed.     ROS  All other systems were reviewed and are negative          Objective:     Vitals:    08/15/19 0853   BP: 132/100   BP Location: Left arm   Patient Position: Sitting   Pulse: 83   Weight: 119 kg (263 lb)   Height: 175.3 cm (69\")     Body mass index is 38.84 kg/m².    PHYSICAL EXAM:  Physical Exam   Constitutional: He is oriented to person, place, and time. He appears well-developed and well-nourished. No distress.   HENT:   Head: Normocephalic.   Eyes: Conjunctivae are normal.   Neck: Normal range of motion. No JVD present.   Cardiovascular: Normal rate, regular rhythm, normal " heart sounds and intact distal pulses.   No murmur heard.  Pulses:       Carotid pulses are 2+ on the right side, and 2+ on the left side.       Radial pulses are 2+ on the right side, and 2+ on the left side.        Posterior tibial pulses are 2+ on the right side, and 2+ on the left side.   Pulmonary/Chest: Effort normal and breath sounds normal. No respiratory distress. He has no wheezes. He has no rhonchi. He has no rales. He exhibits no tenderness.   Abdominal: Soft. Bowel sounds are normal. He exhibits no distension.   Musculoskeletal: Normal range of motion. He exhibits no edema.   Neurological: He is alert and oriented to person, place, and time.   Skin: Skin is warm, dry and intact. No rash noted. He is not diaphoretic. No cyanosis.   Midsternal incision dry clean and intact scab nondraining  Epigastric puncture site right side draining serous pink   Psychiatric: He has a normal mood and affect. His behavior is normal. Judgment and thought content normal.         ECG 12 Lead  Date/Time: 8/15/2019 9:02 AM  Performed by: Annamaria Young APRN  Authorized by: Annamaria Young APRN   Comparison: compared with previous ECG from 8/3/2019  Rhythm: sinus rhythm  Rate: normal  Q waves: II, III and aVF    T flattening: II, III, aVF, I and aVL  QRS axis: normal and right              Current Outpatient Medications   Medication Sig Dispense Refill   • amiodarone (PACERONE) 200 MG tablet TAKE 1 TABLET BY MOUTH TWICE DAILY FOR 7 DAYS THEN 1 TABLET ONCE DAILY (Patient taking differently: Take one tablet by mouth daily) 84 tablet 1   • aspirin 325 MG EC tablet Take 1 tablet by mouth Daily. 30 tablet 2   • atorvastatin (LIPITOR) 40 MG tablet Take 1 tablet by mouth Every Night. 30 tablet 2   • HYDROcodone-acetaminophen (NORCO) 5-325 MG per tablet Take 1 tablet by mouth Every 4 (Four) Hours As Needed for Moderate Pain  for up to 50 doses. 50 tablet 0   • metoprolol tartrate (LOPRESSOR) 25 MG tablet Take 1 tablet by mouth Every 12  (Twelve) Hours. 60 tablet 11     No current facility-administered medications for this visit.      Assessment:       Diagnosis Plan   1. S/P CABG x 5  ECG 12 Lead    Ambulatory Referral to Cardiac Rehab   2. Hospital discharge follow-up  ECG 12 Lead    Ambulatory Referral to Cardiac Rehab   3. History of ST elevation myocardial infarction (STEMI)     4. Essential hypertension     5. Hyperlipidemia LDL goal <70     6. Type 2 diabetes mellitus with complication, without long-term current use of insulin (CMS/Formerly [facility])     7. Nonsustained ventricular tachycardia (CMS/[facility])     8. Suspected sleep apnea  Ambulatory Referral to Sleep Medicine        Orders Placed This Encounter   Procedures   • Ambulatory Referral to Cardiac Rehab     Referral Priority:   Routine     Referral Type:   Rehabilitation - Outpatient     Requested Specialty:   Cardiac Rehabilitation     Number of Visits Requested:   1   • Ambulatory Referral to Sleep Medicine     Referral Priority:   Routine     Referral Type:   Consultation     Referral Reason:   Specialty Services Required     Referred to Provider:   Yelena Vega MD     Requested Specialty:   Sleep Medicine     Number of Visits Requested:   1   • ECG 12 Lead     This order was created via procedure documentation         Plan:       1.  This is a 54-year-old gentleman with history of severe three-vessel coronary artery disease, ejection fraction of 30% status post coronary artery bypass grafting x5 on 8/1/2019 with LIMA to distal LAD, saphenous vein to diagonal, saphenous vein to OM 2, saphenous vein sequential to PDA and left ventricular branch.  Postop echo ejection fraction up to 50-55%  Coronary Artery Disease  Assessment  • The patient has no angina    Subjective - Objective  • There is a history of past MI on or around 7/30/2019  • There is a history of previous coronary artery bypass graft on or around 8/1/2019  • Current antiplatelet therapy includes aspirin 81 mg      -He is doing well.   He has home health services.  He will transition to cardiac rehabilitation at [facility].  Order placed and will be faxed.  Encouraged continued physical activity with a goal of 40 minutes 4 days a week.  I have advised that he clean the oozing puncture site with peroxide twice daily   2.  Hypertension diastolic blood pressure is elevated we will increase his metoprolol they are to follow this at home and if he remains above 130/80 and would initiate ACE or ARB  3.  Hyperlipidemia now on atorvastatin LDL was 141 he will need a repeat study in 3 to 6 months  4.  Probable obstructive sleep apnea to have an outpatient study we will refer him to sleep medicine.  There is a pending home sleep study  5.  Diabetes mellitus this is a new diagnoses for him hemoglobin A1c 6.9 07/30/2019 to be followed up by his PCP  6.  Obesity his BMI is 38.8 he would benefit from diet modification, routine exercise and weight loss  7.  Postoperative anemia improving per home health labs which were reviewed  8.  Nonsustained ventricular tachycardia he is weaning off of amiodarone and will stop that next week    Follow-up in 4 weeks call with questions or concerns.            It has been a pleasure to participate in this patient's care.      Thank you,  KATHY Ojeda      **Nadege Disclaimer:**  Much of this encounter note is an electronic transcription/translation of spoken language to printed text. The electronic translation of spoken language may permit erroneous, or at times, nonsensical words or phrases to be inadvertently transcribed. Although I have reviewed the note for such errors, some may still exist.

## 2019-08-15 ENCOUNTER — OFFICE VISIT (OUTPATIENT)
Dept: CARDIOLOGY | Facility: CLINIC | Age: 54
End: 2019-08-15

## 2019-08-15 ENCOUNTER — READMISSION MANAGEMENT (OUTPATIENT)
Dept: CALL CENTER | Facility: HOSPITAL | Age: 54
End: 2019-08-15

## 2019-08-15 VITALS
DIASTOLIC BLOOD PRESSURE: 100 MMHG | HEIGHT: 69 IN | BODY MASS INDEX: 38.95 KG/M2 | WEIGHT: 263 LBS | SYSTOLIC BLOOD PRESSURE: 132 MMHG | HEART RATE: 83 BPM

## 2019-08-15 DIAGNOSIS — Z09 HOSPITAL DISCHARGE FOLLOW-UP: ICD-10-CM

## 2019-08-15 DIAGNOSIS — E78.5 HYPERLIPIDEMIA LDL GOAL <70: ICD-10-CM

## 2019-08-15 DIAGNOSIS — E11.8 TYPE 2 DIABETES MELLITUS WITH COMPLICATION, WITHOUT LONG-TERM CURRENT USE OF INSULIN (HCC): ICD-10-CM

## 2019-08-15 DIAGNOSIS — R29.818 SUSPECTED SLEEP APNEA: ICD-10-CM

## 2019-08-15 DIAGNOSIS — I47.29 NONSUSTAINED VENTRICULAR TACHYCARDIA (HCC): ICD-10-CM

## 2019-08-15 DIAGNOSIS — Z95.1 S/P CABG X 5: Primary | ICD-10-CM

## 2019-08-15 DIAGNOSIS — I10 ESSENTIAL HYPERTENSION: ICD-10-CM

## 2019-08-15 DIAGNOSIS — I25.2 HISTORY OF ST ELEVATION MYOCARDIAL INFARCTION (STEMI): ICD-10-CM

## 2019-08-15 PROCEDURE — 93000 ELECTROCARDIOGRAM COMPLETE: CPT | Performed by: NURSE PRACTITIONER

## 2019-08-15 PROCEDURE — 99214 OFFICE O/P EST MOD 30 MIN: CPT | Performed by: NURSE PRACTITIONER

## 2019-08-15 NOTE — OUTREACH NOTE
CT Surgery Week 2 Survey      Responses   Facility patient discharged from?  [facility]   Does the patient have one of the following disease processes/diagnoses(primary or secondary)?  Cardiothoracic surgery   Week 2 attempt successful?  No   Unsuccessful attempts  Attempt 1          Martha Caldwell RN

## 2019-08-16 ENCOUNTER — READMISSION MANAGEMENT (OUTPATIENT)
Dept: CALL CENTER | Facility: HOSPITAL | Age: 54
End: 2019-08-16

## 2019-08-16 NOTE — OUTREACH NOTE
CT Surgery Week 2 Survey      Responses   Facility patient discharged from?  [facility]   Does the patient have one of the following disease processes/diagnoses(primary or secondary)?  Cardiothoracic surgery   Week 2 attempt successful?  Yes   Call start time  1214   Call end time  1217   Discharge diagnosis  CABG this visit   Is patient permission given to speak with other caregiver?  Yes   Person spoke with today (if not patient) and relationship  Priscilla Spouse [phone]    Meds reviewed with patient/caregiver?  Yes   Is the patient having any side effects they believe may be caused by any medication additions or changes?  No   Does the patient have all medications related to this admission filled (includes all antibiotics, pain medications, cardiac medications, etc.)  Yes   Is the patient taking all medications as directed (includes completed medication regime)?  Yes   Does the patient have a primary care provider?   Yes   Does the patient have an appointment scheduled with their C/T surgeon?  Yes   Has the patient kept scheduled appointments due by today?  Yes   What is the Home health agency?   [facility]   Has home health visited the patient within 72 hours of discharge?  Yes   Psychosocial issues?  No   Did the patient receive a copy of their discharge instructions?  Yes   Nursing interventions  Reviewed instructions with patient   What is the patient's perception of their health status since discharge?  Improving   Nursing interventions  Nurse provided patient education   Is the patient/caregiver able to teach back normal signs of recovery?  Nausea and lack of appetite, Pain or discomfort at incisional site, Constipation   Nursing interventions  Reassured on normal signs of recovery   Is the patient /caregiver able to teach back basic post-op care?  Practice 'cough and deep breath', Take showers only when approved by MD-sponge bathe until then, No tub bath, swimming, or hot tub until instructed by MD,  Keep incision areas clean, dry and protected, Do not remove steri-strips, Lifting as instructed by MD in discharge instructions   Is the patient/caregiver able to teach back signs and symptoms of incisional infection?  Increased redness, swelling or pain at the incisonal site, Incisional warmth, Increased drainage or bleeding, Pus or odor from incision   Is the patient/caregiver able to teach back steps to recovery at home?  Set small, achievable goals for return to baseline health, Eat a well-balance diet, Rest and rebuild strength, gradually increase activity, Make a list of questions for surgeon's appointment   Is the patient/caregiver able to teach back the hierarchy of who to call/visit for symptoms/problems? PCP, Specialist, Home health nurse, Urgent Care, ED, 911  Yes   Week 2 call completed?  Yes          Radames Cash RN

## 2019-08-26 ENCOUNTER — READMISSION MANAGEMENT (OUTPATIENT)
Dept: CALL CENTER | Facility: HOSPITAL | Age: 54
End: 2019-08-26

## 2019-08-26 NOTE — OUTREACH NOTE
CT Surgery Week 3 Survey      Responses   Facility patient discharged from?  [facility]   Does the patient have one of the following disease processes/diagnoses(primary or secondary)?  Cardiothoracic surgery   Week 3 attempt successful?  No   Unsuccessful attempts  Attempt 1          Radames Cash RN

## 2019-08-27 ENCOUNTER — TELEPHONE (OUTPATIENT)
Dept: CARDIOLOGY | Facility: CLINIC | Age: 54
End: 2019-08-27

## 2019-08-27 ENCOUNTER — READMISSION MANAGEMENT (OUTPATIENT)
Dept: CALL CENTER | Facility: HOSPITAL | Age: 54
End: 2019-08-27

## 2019-08-27 ENCOUNTER — TELEPHONE (OUTPATIENT)
Dept: CARDIAC SURGERY | Facility: CLINIC | Age: 54
End: 2019-08-27

## 2019-08-27 DIAGNOSIS — Z51.81 THERAPEUTIC DRUG MONITORING: Primary | ICD-10-CM

## 2019-08-27 RX ORDER — LISINOPRIL 5 MG/1
5 TABLET ORAL DAILY
Qty: 30 TABLET | Refills: 11 | Status: SHIPPED | OUTPATIENT
Start: 2019-08-27 | End: 2019-09-12 | Stop reason: DRUGHIGH

## 2019-08-27 NOTE — TELEPHONE ENCOUNTER
Thank you. I am happy he has followed up.     We now need to start a second agent for his recent STEMI. I will send lisinopril 5 mg to his  local pharmacy to take in addittion to current dose metoprolol. Have him bring a list to his apt with Dr. Tong in 2 weeks. He will also need to present to [facility] lab 1hour prior for follow up blood work. Let him know lisinopril works through the kidneys and can make potassium high so we need to check labs for that reason. BMP ordered     AL

## 2019-08-27 NOTE — TELEPHONE ENCOUNTER
Mr. Dickey called stating that one of his chest tube sites has some yellow drainage. He states that it does not seem to be infected. He denies any tenderness and swelling. Pt wants to know if he should come in and be seen for this or should he wait until post op with Dr. Zapien on 9-4-19. He also states that he has been putting peroxide on the wound bid.     Message forwarded to Khadra CHAVEZ and she advised him to stop putting peroxide on the wound and paint with betadine bid and that chest tube site drainage was normal and he should follow up with Dr. Zapien on 9-4-19. Pt was agreeable to this.

## 2019-08-27 NOTE — OUTREACH NOTE
CT Surgery Week 3 Survey      Responses   Facility patient discharged from?  [facility]   Does the patient have one of the following disease processes/diagnoses(primary or secondary)?  Cardiothoracic surgery   Week 3 attempt successful?  Yes   Call start time  1430   Call end time  1443   Discharge diagnosis  CABG    Is patient permission given to speak with other caregiver?  Yes   List who call center can speak with  debo Wells   Meds reviewed with patient/caregiver?  Yes   Is the patient having any side effects they believe may be caused by any medication additions or changes?  No   Does the patient have all medications related to this admission filled (includes all antibiotics, pain medications, cardiac medications, etc.)  Yes   Is the patient taking all medications as directed (includes completed medication regime)?  Yes   Does the patient have a primary care provider?   Yes   Does the patient have an appointment scheduled with their C/T surgeon?  Yes [Wednesday Sep 4, 2019 12:30 PM]   Comments regarding PCP  Patient reports has seen PCP since discharge.    Has the patient kept scheduled appointments due by today?  Yes   Comments  Tee Tong MD Thursday Sep 12, 2019 10:00 AM    Home health comments  Discharged by HH.    Psychosocial issues?  No   Comments  Patient monitoring home blood pressure and reports diastolic pressure is usually above 90. Advised to call cardiology NP and report recent blood pressure readings.    Did the patient receive a copy of their discharge instructions?  Yes   Nursing interventions  Reviewed instructions with patient   What is the patient's perception of their health status since discharge?  Improving   Nursing interventions  Nurse provided patient education   Is the patient /caregiver able to teach back basic post-op care?  No tub bath, swimming, or hot tub until instructed by MD, Keep incision areas clean, dry and protected, Lifting as instructed by MD in discharge  instructions, Drive as instructed by MD in discharge instructions, Practice 'cough and deep breath'   Is the patient/caregiver able to teach back signs and symptoms of incisional infection?  Increased redness, swelling or pain at the incisonal site, Increased drainage or bleeding, Incisional warmth, Pus or odor from incision, Fever [Patient having yellow drainage from tube site. Advised patient to contact surgeons office today and report. ]   Is the patient/caregiver able to teach back steps to recovery at home?  Set small, achievable goals for return to baseline health, Rest and rebuild strength, gradually increase activity   Is the patient /caregiver able to teach back the importance of cardiac rehab?  Yes   Nursing interventions  Provided education on importance of cardiac rehab [Patient states that he will start cardiac rehab on 9/5/19 if OK'd with post op appt on 9/4/19. ]   Is the patient/caregiver able to teach back the hierarchy of who to call/visit for symptoms/problems? PCP, Specialist, Home health nurse, Urgent Care, ED, 911  Yes   Week 3 call completed?  Yes          Miriam Yanes RN

## 2019-08-27 NOTE — TELEPHONE ENCOUNTER
08/27/19  2:45 PM  Marco Singletary  1965  Home Phone [phone]       Marco Singletary is a patient of Dr Tong who called office today to inform Annamaria CHAVEZ that BP is still running 130's/90's after metoprolol increased. HR running in 70's. Denies headaches/chest pain/SOA/edema/lightheadedness.    Please advise on how to proceed        Thank you  Karolina Vázquez RN  [facility] Triage Nurse

## 2019-08-27 NOTE — TELEPHONE ENCOUNTER
Called patient with medication instruction and told him about coming to get lab work 1 hour prior to appt with Dr Tong. Verified BMP has been ordered , explained reason to obtain labs.Patient verbalized understanding.    Karolina Vázquez RN  [facility] Triage Nurse

## 2019-08-30 ENCOUNTER — APPOINTMENT (OUTPATIENT)
Dept: SLEEP MEDICINE | Facility: HOSPITAL | Age: 54
End: 2019-08-30

## 2019-09-04 ENCOUNTER — READMISSION MANAGEMENT (OUTPATIENT)
Dept: CALL CENTER | Facility: HOSPITAL | Age: 54
End: 2019-09-04

## 2019-09-04 ENCOUNTER — OFFICE VISIT (OUTPATIENT)
Dept: CARDIAC SURGERY | Facility: CLINIC | Age: 54
End: 2019-09-04

## 2019-09-04 VITALS
HEART RATE: 86 BPM | SYSTOLIC BLOOD PRESSURE: 161 MMHG | HEIGHT: 69 IN | WEIGHT: 258 LBS | TEMPERATURE: 98.5 F | BODY MASS INDEX: 38.21 KG/M2 | OXYGEN SATURATION: 98 % | DIASTOLIC BLOOD PRESSURE: 101 MMHG

## 2019-09-04 DIAGNOSIS — Z95.1 S/P CABG (CORONARY ARTERY BYPASS GRAFT): ICD-10-CM

## 2019-09-04 PROCEDURE — 99024 POSTOP FOLLOW-UP VISIT: CPT | Performed by: NURSE PRACTITIONER

## 2019-09-04 NOTE — PROGRESS NOTES
"CARDIOVASCULAR SURGERY FOLLOW-UP PROGRESS NOTE  Chief Complaint: post op        HPI:   Dear Dr. Serrato, KATHY Morales and colleagues:    It was nice to see Marco Singletary in follow up 4 weeks  after surgery.  As you know, he is a 54 y.o. male with severe CAD who underwent urgent CABGx5. He did well postoperatively and continues to do well. He comes in today complaining of nothing.  His activity level has been good.       Physical Exam:         BP (!) 161/101   Pulse 86   Temp 98.5 °F (36.9 °C)   Ht 175.3 cm (69\")   Wt 117 kg (258 lb)   SpO2 98%   BMI 38.10 kg/m²   Heart:  regular rate and rhythm  Lungs:  clear to auscultation bilaterally  Extremities:  no edema  Incision(s):  sternum stable    Assessment/Plan:     S/P CABG. Overall, he is doing well.    No significant post-op complications    OK to drive if not taking narcotic pain medicine  OK to begin cardiac rehab  Follow-up as scheduled with cardiology  Follow-up as scheduled with PCP    Thank you for allowing me to participate in the care of your   patient.  Regards,  KATHY Grace      "

## 2019-09-04 NOTE — OUTREACH NOTE
CT Surgery Week 4 Survey      Responses   Facility patient discharged from?  [facility]   Does the patient have one of the following disease processes/diagnoses(primary or secondary)?  Cardiothoracic surgery   Week 4 attempt successful?  Yes   Call start time  1547   Call end time  1551   Discharge diagnosis  CABG    Meds reviewed with patient/caregiver?  Yes   Is the patient having any side effects they believe may be caused by any medication additions or changes?  No   Is the patient taking all medications as directed (includes completed medication regime)?  Yes   Has the patient kept scheduled appointments due by today?  Yes   Comments  Pt saw surgeon today and will starte cardiac rehab.   Is the patient still receiving Home Health Services?  Yes   Psychosocial issues?  No   Comments  BP slightly high but PCP is aware.     What is the patient's perception of their health status since discharge?  Improving   Is the patient/caregiver able to teach back steps to recovery at home?  Set small, achievable goals for return to baseline health, Rest and rebuild strength, gradually increase activity, Weigh daily, Practice good oral hygiene, Eat a well-balance diet, Make a list of questions for surgeon's appointment   Is the patient /caregiver able to teach back the importance of cardiac rehab?  Yes   Is the patient/caregiver able to teach back the hierarchy of who to call/visit for symptoms/problems? PCP, Specialist, Home health nurse, Urgent Care, ED, 911  Yes   Week 4 Call Completed?  Yes   Would the patient like one additional call?  No   Graduated  Yes   Did the patient feel the follow up calls were helpful during their recovery period?  Yes   Was the number of calls appropriate?  Yes   Wrap up additional comments  Pt staates he is doing great and very appreciative of care provided.          Sarahi Trejo RN

## 2019-09-05 ENCOUNTER — HOSPITAL ENCOUNTER (OUTPATIENT)
Dept: CARDIAC REHAB | Facility: HOSPITAL | Age: 54
Setting detail: RECURRING SERIES
Discharge: HOME | End: 2019-10-23

## 2019-09-05 ENCOUNTER — HOSPITAL ENCOUNTER (OUTPATIENT)
Dept: SLEEP MEDICINE | Facility: HOSPITAL | Age: 54
Discharge: HOME | End: 2019-09-05
Attending: INTERNAL MEDICINE

## 2019-09-09 ENCOUNTER — TELEPHONE (OUTPATIENT)
Dept: CARDIOLOGY | Facility: CLINIC | Age: 54
End: 2019-09-09

## 2019-09-09 RX ORDER — METOPROLOL TARTRATE 50 MG/1
50 TABLET, FILM COATED ORAL 2 TIMES DAILY
Qty: 60 TABLET | Refills: 11 | Status: SHIPPED | OUTPATIENT
Start: 2019-09-09 | End: 2019-09-12 | Stop reason: SDUPTHER

## 2019-09-09 NOTE — TELEPHONE ENCOUNTER
09/09/19  11:40 AM  Marco Singletary  1965  Home Phone [phone]       Marco Singletary is a patient of Dr Yu who had a CABG 8/23/19 and is having cardiac rehab at [facility]. Rehab called office today to report patient /100  HR 86 Upon arrival to rehab and went up to 152/102. They do not feel comfortable doing rehab on him today and questioning whether his BP machine at home is accuurate.  His lisinopril was increased from 5mg to 10mg last week. Patient states that his SBP at home runs 127-140's.  Patient has appt with Dr Yu 9/12/19    Please advise on how to proceed    Thank you  Karolina Vázquez RN  [facility] Triage Nurse

## 2019-09-09 NOTE — TELEPHONE ENCOUNTER
Called patient and left VM to call office    Karolina Vázquez, RN  Triage Nurse  [facility]

## 2019-09-09 NOTE — TELEPHONE ENCOUNTER
Patient called back and notified of dosage change, new rx sent to pharmacy  Thanks  Marilyn Moseley RN  Triage nurse

## 2019-09-12 ENCOUNTER — OFFICE VISIT (OUTPATIENT)
Dept: CARDIOLOGY | Facility: CLINIC | Age: 54
End: 2019-09-12

## 2019-09-12 ENCOUNTER — LAB (OUTPATIENT)
Dept: LAB | Facility: HOSPITAL | Age: 54
End: 2019-09-12

## 2019-09-12 VITALS
SYSTOLIC BLOOD PRESSURE: 140 MMHG | BODY MASS INDEX: 39.01 KG/M2 | WEIGHT: 263.4 LBS | HEIGHT: 69 IN | DIASTOLIC BLOOD PRESSURE: 100 MMHG | HEART RATE: 77 BPM

## 2019-09-12 DIAGNOSIS — I25.810 CORONARY ARTERY DISEASE INVOLVING CORONARY BYPASS GRAFT OF NATIVE HEART WITHOUT ANGINA PECTORIS: Primary | ICD-10-CM

## 2019-09-12 DIAGNOSIS — Z51.81 THERAPEUTIC DRUG MONITORING: ICD-10-CM

## 2019-09-12 DIAGNOSIS — E78.2 MIXED HYPERLIPIDEMIA: ICD-10-CM

## 2019-09-12 DIAGNOSIS — E11.8 TYPE 2 DIABETES MELLITUS WITH COMPLICATION, WITHOUT LONG-TERM CURRENT USE OF INSULIN (HCC): ICD-10-CM

## 2019-09-12 DIAGNOSIS — I10 ESSENTIAL HYPERTENSION: ICD-10-CM

## 2019-09-12 LAB
ANION GAP SERPL CALCULATED.3IONS-SCNC: 13.3 MMOL/L (ref 5–15)
BUN BLD-MCNC: 14 MG/DL (ref 6–20)
BUN/CREAT SERPL: 12.5 (ref 7–25)
CALCIUM SPEC-SCNC: 9.5 MG/DL (ref 8.6–10.5)
CHLORIDE SERPL-SCNC: 107 MMOL/L (ref 98–107)
CO2 SERPL-SCNC: 23.7 MMOL/L (ref 22–29)
CREAT BLD-MCNC: 1.12 MG/DL (ref 0.76–1.27)
GFR SERPL CREATININE-BSD FRML MDRD: 68 ML/MIN/1.73
GLUCOSE BLD-MCNC: 116 MG/DL (ref 65–99)
POTASSIUM BLD-SCNC: 4.5 MMOL/L (ref 3.5–5.2)
SODIUM BLD-SCNC: 144 MMOL/L (ref 136–145)

## 2019-09-12 PROCEDURE — 93000 ELECTROCARDIOGRAM COMPLETE: CPT | Performed by: INTERNAL MEDICINE

## 2019-09-12 PROCEDURE — 99214 OFFICE O/P EST MOD 30 MIN: CPT | Performed by: INTERNAL MEDICINE

## 2019-09-12 PROCEDURE — 80048 BASIC METABOLIC PNL TOTAL CA: CPT

## 2019-09-12 PROCEDURE — 36415 COLL VENOUS BLD VENIPUNCTURE: CPT

## 2019-09-12 RX ORDER — METOPROLOL TARTRATE 50 MG/1
75 TABLET, FILM COATED ORAL 2 TIMES DAILY
Qty: 90 TABLET | Refills: 11 | Status: SHIPPED | OUTPATIENT
Start: 2019-09-12 | End: 2019-12-04 | Stop reason: SDUPTHER

## 2019-09-12 RX ORDER — LOSARTAN POTASSIUM 100 MG/1
100 TABLET ORAL DAILY
Qty: 30 TABLET | Refills: 11 | Status: SHIPPED | OUTPATIENT
Start: 2019-09-12 | End: 2019-12-04 | Stop reason: SDUPTHER

## 2019-09-12 NOTE — PROGRESS NOTES
Date of Office Visit: 19  Encounter Provider: Tee Tong MD  Place of Service: [facility] CARDIOLOGY  Patient Name: Marco Singletary  :1965  Referral Provider:No ref. provider found      Chief Complaint   Patient presents with   • Follow-up     History of Present Illness  Marco Singletary is a 54 y.o. male  with history of coronary artery disease, myocardial infarction status post coronary artery bypass grafting x5, nonsustained ventricular tachycardia edema, diabetes mellitus, sleep apnea and obesity.  He presented with acute inferior STEMI on 2019 was found to have severe three-vessel disease with an EF of 30%.  He had CABG x5 with LIMA to distal LAD, saphenous vein to diagonal, saphenous vein to OM 2, saphenous vein sequential to PDA and left ventricular branch. Intra op ERICA EF 40 %.  He had some nonsustained ventricular tachycardia so he was treated with amiodarone and transition to p.o. amiodarone.  Before being extubated in recovery he vomited he was treated with broad-spectrum antibiotic for possible aspiration.  Later that evening he was able to be successfully extubated. he was found to have diabetes mellitus.  He was weaned off all vasopressors and was able to be started on beta-blockade.  His LDL was 179 and he was started on atorvastatin 40mg.  His carotid duplex showed mild bilateral stenosis. He had an uneventful course and was discharged on postoperative day 4 with [facility].  He comes in for follow-up.  He is been doing very well.  No chest pain or pressure.  No shortness of breath orthopnea or PND.  No palpitations no near syncope or syncope.  Unfortunately his blood pressures been high and they have not been letting him exercise and rehab.  He also has a cough for about a week and a half its been driving a little bit crazy.          Past Medical History:   Diagnosis Date   • 3-vessel CAD     severe, now  s/p CABG   • GERD  (gastroesophageal reflux disease)    • HLD (hyperlipidemia)    • Hypertension    • Leukocytosis     post-op   • Obesity    • STEMI (ST elevation myocardial infarction) (CMS/[facility]ly [facility])          Past Surgical History:   Procedure Laterality Date   • CORONARY ARTERY BYPASS GRAFT N/A 8/1/2019    Procedure: ERICA STERNOTOMY CORONARY ARTERY BYPASS GRAFT TIMES 5 USING LEFT INTERNAL MAMMARY ARTERY AND LEFT GREATER SAPHENOUS VEIN GRAFT PER ENDOSCOPIC VEIN HARVESTING AND PRP;  Surgeon: Christiano Zapien MD;  Location: [facility];  Service: Cardiothoracic   • HERNIA REPAIR           Current Outpatient Medications on File Prior to Visit   Medication Sig Dispense Refill   • aspirin 325 MG EC tablet Take 1 tablet by mouth Daily. 30 tablet 2   • atorvastatin (LIPITOR) 40 MG tablet Take 1 tablet by mouth Every Night. 30 tablet 2   • Dextromethorphan-guaiFENesin (MUCINEX DM PO) Take  by mouth Daily.     • Fluticasone Propionate (FLONASE NA) into the nostril(s) as directed by provider Daily.     • HYDROcodone-acetaminophen (NORCO) 5-325 MG per tablet Take 1 tablet by mouth Every 4 (Four) Hours As Needed for Moderate Pain  for up to 50 doses. 50 tablet 0   • [DISCONTINUED] amiodarone (PACERONE) 200 MG tablet TAKE 1 TABLET BY MOUTH TWICE DAILY FOR 7 DAYS THEN 1 TABLET ONCE DAILY (Patient taking differently: Take one tablet by mouth daily) 84 tablet 1   • [DISCONTINUED] lisinopril (PRINIVIL,ZESTRIL) 10 MG tablet Take 10 mg by mouth Daily.     • [DISCONTINUED] metoprolol tartrate (LOPRESSOR) 50 MG tablet Take 1 tablet by mouth 2 (Two) Times a Day. 60 tablet 11   • [DISCONTINUED] lisinopril (PRINIVIL,ZESTRIL) 5 MG tablet Take 1 tablet by mouth Daily. 30 tablet 11     No current facility-administered medications on file prior to visit.          Social History     Socioeconomic History   • Marital status:      Spouse name: Not on file   • Number of children: Not on file   • Years of education: Not on file   • Highest education level:  Not on file   Tobacco Use   • Smoking status: Never Smoker   • Smokeless tobacco: Never Used   • Tobacco comment: caffeine use - 1 cup coffee daily   Substance and Sexual Activity   • Alcohol use: No   • Drug use: No   • Sexual activity: Defer   Lifestyle   • Physical activity:     Days per week: 7 days     Minutes per session: 30 min   • Stress: Not on file       Family History   Problem Relation Age of Onset   • Dysrhythmia Father    • Hypertension Father    • Hypertension Mother    • Hypertension Maternal Grandmother    • Hypertension Maternal Grandfather          Review of Systems   Constitution: Negative for decreased appetite, diaphoresis, fever, weakness, malaise/fatigue, weight gain and weight loss.   HENT: Negative for congestion, hearing loss, nosebleeds and tinnitus.    Eyes: Negative for blurred vision, double vision, vision loss in left eye, vision loss in right eye and visual disturbance.   Cardiovascular:        As noted in HPI   Respiratory:        As noted HPI   Endocrine: Negative for cold intolerance and heat intolerance.   Hematologic/Lymphatic: Negative for bleeding problem. Does not bruise/bleed easily.   Skin: Negative for color change, flushing, itching and rash.   Musculoskeletal: Negative for arthritis, back pain, joint pain, joint swelling, muscle weakness and myalgias.   Gastrointestinal: Negative for bloating, abdominal pain, constipation, diarrhea, dysphagia, heartburn, hematemesis, hematochezia, melena, nausea and vomiting.   Genitourinary: Negative for bladder incontinence, dysuria, frequency, nocturia and urgency.   Neurological: Negative for dizziness, focal weakness, headaches, light-headedness, loss of balance, numbness, paresthesias and vertigo.   Psychiatric/Behavioral: Negative for depression, memory loss and substance abuse.       Procedures      ECG 12 Lead  Date/Time: 9/12/2019 10:14 AM  Performed by: Tee Tong MD  Authorized by: Tee Tong MD   Comparison:  "compared with previous ECG   Similar to previous ECG  Rhythm: sinus rhythm  Rate: normal  Q wave noted on lead: Possible old inferior MI.    QRS axis: normal                  Objective:    /100 (BP Location: Right arm)   Pulse 77   Ht 175.3 cm (69\")   Wt 119 kg (263 lb 6.4 oz)   BMI 38.90 kg/m²        Physical Exam  Physical Exam   Constitutional: He is oriented to person, place, and time. He appears well-developed and well-nourished. No distress.   HENT:   Head: Normocephalic.   Eyes: Conjunctivae are normal. Pupils are equal, round, and reactive to light. No scleral icterus.   Neck: Normal carotid pulses, no hepatojugular reflux and no JVD present. Carotid bruit is not present. No tracheal deviation, no edema and no erythema present. No thyromegaly present.   Cardiovascular: Normal rate, regular rhythm, S1 normal, S2 normal, normal heart sounds and intact distal pulses.  No extrasystoles are present. PMI is not displaced. Exam reveals no gallop, no distant heart sounds and no friction rub.   No murmur heard.  Pulses:       Carotid pulses are 2+ on the right side, and 2+ on the left side.       Radial pulses are 2+ on the right side, and 2+ on the left side.        Femoral pulses are 2+ on the right side, and 2+ on the left side.       Dorsalis pedis pulses are 2+ on the right side, and 2+ on the left side.        Posterior tibial pulses are 2+ on the right side, and 2+ on the left side.   Pulmonary/Chest: Effort normal and breath sounds normal. No respiratory distress. He has no decreased breath sounds. He has no wheezes. He has no rhonchi. He has no rales. He exhibits no tenderness.   Abdominal: Soft. Bowel sounds are normal. He exhibits no distension and no mass. There is no hepatosplenomegaly. There is no tenderness. There is no rebound and no guarding.   Musculoskeletal: He exhibits no edema, tenderness or deformity.   Neurological: He is alert and oriented to person, place, and time.   Skin: Skin is " warm and dry. No rash noted. He is not diaphoretic. No cyanosis or erythema. No pallor. Nails show no clubbing.   Psychiatric: He has a normal mood and affect. His speech is normal and behavior is normal. Judgment and thought content normal.           Assessment:   1.  This is a 54-year-old gentleman with history of severe three-vessel coronary artery disease, ejection fraction of 30% status post coronary artery bypass grafting x5 on 8/1/2019 with LIMA to distal LAD, saphenous vein to diagonal, saphenous vein to OM 2, saphenous vein sequential to PDA and left ventricular branch.  Postop echo ejection fraction up to 50-55%.  Coronary Artery Disease  Assessment  • The patient has no angina  • There is a new diagnosis of stable angina in the past 12 months    Plan  • Lifestyle modifications discussed include adhering to a heart healthy diet, avoidance of tobacco products, maintenance of a healthy weight, medication compliance, regular exercise and regular monitoring of cholesterol and blood pressure    Subjective - Objective  • There is a history of past MI  • There is a history of previous coronary artery bypass graft  • Current antiplatelet therapy includes aspirin 325 mg  • The patient qualifies for cardiac rehabilitation, and has been referred to cardiac rehab      We are stopping his amiodarone.  He will see his skin follow-up in 3 months me 6 months after that and call if problems.  I also do not see why he cannot exercise and rehab he is doing it at home.    2.  Hypertension.  Blood pressure still little elevated with this cough we will stop the lisinopril start losartan 100 mg a day increase his metoprolol to 75 mg twice a day and he will continue to keep track of his blood pressure.  3.  Hyperlipidemia now on atorvastatin.  Will need follow-up lipid profile in 2 or 3 months if his LDL is not at 70 or less will need to increase that dose.  4.  Probable obstructive sleep apnea to have an outpatient study we will  refer him to sleep medicine.  There is a pending home sleep study  5.  Diabetes mellitus.  Followed by PCP.  6.  Nonsustained ventricular tachycardia.  7.  Obesity his BMI is 38.8 he would benefit from diet modification, routine exercise and weight loss         Plan:

## 2019-09-13 ENCOUNTER — TELEPHONE (OUTPATIENT)
Dept: CARDIOLOGY | Facility: CLINIC | Age: 54
End: 2019-09-13

## 2019-09-13 NOTE — TELEPHONE ENCOUNTER
Pt called requesting a work release.  He states he s/w you about this during his office visit yesterday.  He would like to return to work on 9/30/19 w/o restrictions.  Is this okay?  If so, are you able to dictate a letter?    Thanks-Lorenza

## 2019-09-26 ENCOUNTER — TELEPHONE (OUTPATIENT)
Dept: CARDIOLOGY | Facility: CLINIC | Age: 54
End: 2019-09-26

## 2019-09-26 NOTE — TELEPHONE ENCOUNTER
We received a fax regarding Mr. Singletary.  He is due for a colonoscopy.  They would like to hold his  mg for 7 days prior.  I placed the fax in your inbox./saundra

## 2019-09-27 ENCOUNTER — HOSPITAL ENCOUNTER (OUTPATIENT)
Dept: SLEEP MEDICINE | Facility: HOSPITAL | Age: 54
Discharge: HOME | End: 2019-09-27
Attending: INTERNAL MEDICINE

## 2019-10-02 ENCOUNTER — TELEPHONE (OUTPATIENT)
Dept: CARDIOLOGY | Facility: CLINIC | Age: 54
End: 2019-10-02

## 2019-10-02 RX ORDER — AMLODIPINE BESYLATE 2.5 MG/1
2.5 TABLET ORAL DAILY
Qty: 30 TABLET | Refills: 11 | Status: SHIPPED | OUTPATIENT
Start: 2019-10-02 | End: 2019-12-04 | Stop reason: SDUPTHER

## 2019-10-03 ENCOUNTER — CLINICAL SUPPORT (OUTPATIENT)
Dept: CARDIAC SURGERY | Facility: CLINIC | Age: 54
End: 2019-10-03

## 2019-10-03 DIAGNOSIS — Z48.812 ENCNTR FOR SURGICAL AFTCR FOLLOWING SURGERY ON THE CIRC SYS: Primary | ICD-10-CM

## 2019-10-03 DIAGNOSIS — I21.3 ST ELEVATION MYOCARDIAL INFARCTION (STEMI), UNSPECIFIED ARTERY (HCC): ICD-10-CM

## 2019-10-03 DIAGNOSIS — I25.10 ATHSCL HEART DISEASE OF NATIVE CORONARY ARTERY W/O ANG PCTRS: ICD-10-CM

## 2019-10-03 DIAGNOSIS — I10 ESSENTIAL (PRIMARY) HYPERTENSION: ICD-10-CM

## 2019-10-03 PROCEDURE — G0180 MD CERTIFICATION HHA PATIENT: HCPCS | Performed by: THORACIC SURGERY (CARDIOTHORACIC VASCULAR SURGERY)

## 2019-11-04 ENCOUNTER — TELEPHONE (OUTPATIENT)
Dept: CARDIOLOGY | Facility: CLINIC | Age: 54
End: 2019-11-04

## 2019-11-04 DIAGNOSIS — E78.2 MIXED HYPERLIPIDEMIA: Primary | ICD-10-CM

## 2019-11-04 RX ORDER — ATORVASTATIN CALCIUM 40 MG/1
40 TABLET, FILM COATED ORAL NIGHTLY
Qty: 30 TABLET | Refills: 0 | Status: SHIPPED | OUTPATIENT
Start: 2019-11-04 | End: 2019-12-04 | Stop reason: SDUPTHER

## 2019-11-04 NOTE — TELEPHONE ENCOUNTER
Patient wanted to know what the results to his lab work was.  He wanted to know if he needed to increase or stay on the same dose.  Please advise.    CB: [phone]    Thanks,  Fernando

## 2019-11-04 NOTE — TELEPHONE ENCOUNTER
That was my fault, I miss understood what the patient was asking.  He wanted to know if we could put in the order for his Lipid panel and fax it to his PCP-[facility].      Thanks,  Fernando

## 2019-11-04 NOTE — TELEPHONE ENCOUNTER
"What lab work. My last note 9/19 \"Will need follow-up lipid profile in 2 or 3 months if his LDL is not at 70 or less will need to increase that dose.\"MJI  "

## 2019-11-22 ENCOUNTER — HOSPITAL ENCOUNTER (OUTPATIENT)
Dept: SLEEP MEDICINE | Facility: HOSPITAL | Age: 54
Discharge: HOME | End: 2019-11-22
Attending: INTERNAL MEDICINE

## 2019-12-04 RX ORDER — METOPROLOL TARTRATE 50 MG/1
75 TABLET, FILM COATED ORAL 2 TIMES DAILY
Qty: 270 TABLET | Refills: 3 | Status: SHIPPED | OUTPATIENT
Start: 2019-12-04 | End: 2020-11-05

## 2019-12-04 RX ORDER — ATORVASTATIN CALCIUM 40 MG/1
40 TABLET, FILM COATED ORAL NIGHTLY
Qty: 90 TABLET | Refills: 3 | Status: SHIPPED | OUTPATIENT
Start: 2019-12-04 | End: 2020-11-05

## 2019-12-04 RX ORDER — AMLODIPINE BESYLATE 2.5 MG/1
2.5 TABLET ORAL DAILY
Qty: 90 TABLET | Refills: 3 | Status: SHIPPED | OUTPATIENT
Start: 2019-12-04 | End: 2020-11-05

## 2019-12-04 RX ORDER — LOSARTAN POTASSIUM 100 MG/1
100 TABLET ORAL DAILY
Qty: 90 TABLET | Refills: 3 | Status: SHIPPED | OUTPATIENT
Start: 2019-12-04 | End: 2020-11-05

## 2019-12-06 ENCOUNTER — HOSPITAL ENCOUNTER (OUTPATIENT)
Dept: GASTROENTEROLOGY | Facility: HOSPITAL | Age: 54
Setting detail: HOSPITAL OUTPATIENT SURGERY
Discharge: HOME | End: 2019-12-06
Attending: INTERNAL MEDICINE

## 2019-12-12 ENCOUNTER — OFFICE VISIT (OUTPATIENT)
Dept: CARDIOLOGY | Facility: CLINIC | Age: 54
End: 2019-12-12

## 2019-12-12 ENCOUNTER — TELEPHONE (OUTPATIENT)
Dept: CARDIOLOGY | Facility: CLINIC | Age: 54
End: 2019-12-12

## 2019-12-12 VITALS
RESPIRATION RATE: 18 BRPM | BODY MASS INDEX: 39.58 KG/M2 | WEIGHT: 267.2 LBS | HEIGHT: 69 IN | HEART RATE: 75 BPM | SYSTOLIC BLOOD PRESSURE: 132 MMHG | DIASTOLIC BLOOD PRESSURE: 80 MMHG

## 2019-12-12 DIAGNOSIS — E78.2 MIXED HYPERLIPIDEMIA: ICD-10-CM

## 2019-12-12 DIAGNOSIS — I47.29 NONSUSTAINED VENTRICULAR TACHYCARDIA (HCC): ICD-10-CM

## 2019-12-12 DIAGNOSIS — I10 ESSENTIAL HYPERTENSION: ICD-10-CM

## 2019-12-12 DIAGNOSIS — I25.810 CORONARY ARTERY DISEASE INVOLVING CORONARY BYPASS GRAFT OF NATIVE HEART WITHOUT ANGINA PECTORIS: Primary | ICD-10-CM

## 2019-12-12 DIAGNOSIS — Z95.1 S/P CABG X 5: ICD-10-CM

## 2019-12-12 DIAGNOSIS — E11.8 TYPE 2 DIABETES MELLITUS WITH COMPLICATION, WITHOUT LONG-TERM CURRENT USE OF INSULIN (HCC): ICD-10-CM

## 2019-12-12 DIAGNOSIS — E78.5 HYPERLIPIDEMIA LDL GOAL <70: ICD-10-CM

## 2019-12-12 PROCEDURE — 93000 ELECTROCARDIOGRAM COMPLETE: CPT | Performed by: NURSE PRACTITIONER

## 2019-12-12 PROCEDURE — 99214 OFFICE O/P EST MOD 30 MIN: CPT | Performed by: NURSE PRACTITIONER

## 2019-12-12 RX ORDER — ASPIRIN 81 MG/1
81 TABLET, CHEWABLE ORAL DAILY
COMMUNITY

## 2019-12-12 RX ORDER — ESZOPICLONE 2 MG/1
2 TABLET, FILM COATED ORAL AS NEEDED
COMMUNITY

## 2019-12-12 NOTE — TELEPHONE ENCOUNTER
Received requested lab results from Dr. Serrato but there was no lipid results done 8/12/19 by PCP. / Results placed in your inbox.  Thank you/ SHERRIE

## 2019-12-12 NOTE — TELEPHONE ENCOUNTER
This CBC was already in [facility]. Patient thought he had a lipid panel within last two weeks ordered by his PCP but performed at [facility]. If unable to obtain/confirm- we will need to have this done here.

## 2019-12-12 NOTE — TELEPHONE ENCOUNTER
I have called Dr. Serrato's office [phone]  to request copy of most recent labs to include lipid panel.  Their medical records person states she will fax this to us. /SHERRIE

## 2019-12-12 NOTE — PROGRESS NOTES
Date of Office Visit: 19  Encounter Provider: KATHY Ojeda  Place of Service: [facility] CARDIOLOGY  Patient Name: Marco Singletary  :1965    Chief Complaint   Patient presents with   • Coronary Artery Disease   • Follow-up   :     HPI: Marco Singletary is a 54 y.o. male  with history of coronary artery disease, myocardial infarction status post coronary artery bypass grafting x5, nonsustained ventricular tachycardia edema, diabetes mellitus, sleep apnea and obesity.  He is followed by Dr. Tong.  I will see him in follow-up today and have reviewed his medical record.        He presented with acute inferior STEMI was found to have severe three-vessel disease with an EF of 30%.  He had CABG x5 on 2019 with LIMA to distal LAD, saphenous vein to diagonal, saphenous vein to OM 2, saphenous vein sequential to PDA and left ventricular branch. Intra op ERICA EF 40 %.  He had some nonsustained ventricular tachycardia so he was treated with amiodarone and transition to p.o. amiodarone.  Before being extubated in recovery he vomited he was treated with broad-spectrum antibiotic for possible aspiration.  Later that evening he was able to be successfully extubated. he was found to have diabetes mellitus.  He was weaned off all vasopressors and was able to be started on beta-blockade.  His LDL was 179 and he was started on atorvastatin 40mg.  His carotid duplex showed mild bilateral stenosis. He had an uneventful course and was discharged on postoperative day 4 with [facility].He was discharged home on Aspirin, stain, beta blocker, a 5 day course of Lasix/Potassium, and Amoxicillin to complete a full 10 day course of antibiotics.    His postoperative echocardiogram showed his ejection fraction up to 51-55% with mild concentric hypertrophy and no significant valvular issues.  Amiodarone was weaned outpatient.  He had some elevated blood pressure so we stopped  "lisinopril and started losartan 100 mg daily and increased his metoprolol.  Amlodipine was later added and he obtain controlled blood pressure values after that.  He ultimately started using CPAP.  He presents today for 3-month follow-up.  He reports having cholesterol values with his PCP a few weeks ago.  He denies chest pain tightness pressure, palpitation, shortness of breath, edema, fatigue, orthopnea, PND.            Allergies   Allergen Reactions   • Bee Venom Swelling       Past Medical History:   Diagnosis Date   • 3-vessel CAD     severe, now  s/p CABG   • GERD (gastroesophageal reflux disease)    • HLD (hyperlipidemia)    • Hypertension    • Leukocytosis     post-op   • Obesity    • STEMI (ST elevation myocardial infarction) (CMS/[facility]ly [facility])        Past Surgical History:   Procedure Laterality Date   • CORONARY ARTERY BYPASS GRAFT N/A 8/1/2019    Procedure: ERICA STERNOTOMY CORONARY ARTERY BYPASS GRAFT TIMES 5 USING LEFT INTERNAL MAMMARY ARTERY AND LEFT GREATER SAPHENOUS VEIN GRAFT PER ENDOSCOPIC VEIN HARVESTING AND PRP;  Surgeon: Christiano Zapien MD;  Location: [facility];  Service: Cardiothoracic   • HERNIA REPAIR           Family and social history reviewed.     ROS  All other systems were reviewed and are negative          Objective:     Vitals:    12/12/19 0825   BP: 132/80   BP Location: Left arm   Patient Position: Sitting   Pulse: 75   Resp: 18   Weight: 121 kg (267 lb 3.2 oz)   Height: 175.3 cm (69\")     Body mass index is 39.46 kg/m².    PHYSICAL EXAM:  Physical Exam   Constitutional: He is oriented to person, place, and time. He appears well-developed and well-nourished. No distress.   obesity   HENT:   Head: Normocephalic.   Eyes: Conjunctivae are normal.   Neck: Normal range of motion. No JVD present.   Cardiovascular: Normal rate, regular rhythm, normal heart sounds and intact distal pulses.   No murmur heard.  Pulses:       Carotid pulses are 2+ on the right side, and 2+ on the left side.       " Radial pulses are 2+ on the right side, and 2+ on the left side.        Posterior tibial pulses are 2+ on the right side, and 2+ on the left side.   Pulmonary/Chest: Effort normal and breath sounds normal. No respiratory distress. He has no wheezes. He has no rhonchi. He has no rales. He exhibits no tenderness.   Abdominal: Soft. Bowel sounds are normal. He exhibits no distension.   Musculoskeletal: Normal range of motion. He exhibits no edema.   Neurological: He is alert and oriented to person, place, and time.   Skin: Skin is warm, dry and intact. No rash noted. He is not diaphoretic. No cyanosis.   Psychiatric: He has a normal mood and affect. His behavior is normal. Judgment and thought content normal.         ECG 12 Lead  Date/Time: 12/12/2019 8:33 AM  Performed by: Annamaria Young APRN  Authorized by: Annamaria Young APRN   Comparison: compared with previous ECG   Similar to previous ECG  Rhythm: sinus rhythm  Rate: normal  T inversion: II, III and aVF  QRS axis: normal    Clinical impression: abnormal EKG  Comments: No significant change             Current Outpatient Medications   Medication Sig Dispense Refill   • amLODIPine (NORVASC) 2.5 MG tablet Take 1 tablet by mouth Daily. 90 tablet 3   • aspirin 81 MG chewable tablet Chew 81 mg Daily.     • atorvastatin (LIPITOR) 40 MG tablet Take 1 tablet by mouth Every Night. 90 tablet 3   • eszopiclone (LUNESTA) 2 MG tablet Take 2 mg by mouth Every Night. Take immediately before bedtime     • Fluticasone Propionate (FLONASE NA) into the nostril(s) as directed by provider Daily.     • losartan (COZAAR) 100 MG tablet Take 1 tablet by mouth Daily. 90 tablet 3   • metoprolol tartrate (LOPRESSOR) 50 MG tablet Take 1.5 tablets by mouth 2 (Two) Times a Day. 270 tablet 3     No current facility-administered medications for this visit.      Assessment:       Diagnosis Plan   1. Coronary artery disease involving coronary bypass graft of native heart without angina pectoris      2. Mixed hyperlipidemia     3. Essential hypertension     4. Nonsustained ventricular tachycardia (CMS/HCC)     5. S/P CABG x 5     6. Hyperlipidemia LDL goal <70     7. Type 2 diabetes mellitus with complication, without long-term current use of insulin (CMS/HCC)          Orders Placed This Encounter   Procedures   • ECG 12 Lead     This order was created via procedure documentation         Plan:       1.  This is a 54-year-old gentleman with history of severe three-vessel coronary artery disease, ejection fraction of 30% status post coronary artery bypass grafting x5 on 8/1/2019 with LIMA to distal LAD, saphenous vein to diagonal, saphenous vein to OM 2, saphenous vein sequential to PDA and left ventricular branch.  Postop echo ejection fraction up to 50-55%  Coronary Artery Disease  Assessment  • The patient has no angina  • There is a new diagnosis of stable angina in the past 12 months    Plan  • Lifestyle modifications discussed include adhering to a heart healthy diet, avoidance of tobacco products, maintenance of a healthy weight, medication compliance, regular exercise and regular monitoring of cholesterol and blood pressure    Subjective - Objective  • There is a history of past MI  • There is a history of previous coronary artery bypass graft  • Current antiplatelet therapy includes aspirin 81 mg  • The patient qualifies for cardiac rehabilitation      He completed cardiac rehab.  He is to increase his physical activity and exercise more frequently      2.  Hypertension diastolic blood pressure appears at goal continue current regimen  3.  Hyperlipidemia now on atorvastatin LDL was 141 we will try to obtain his most recent lipid panel LDL is not 70 or less we will attempt increasing atorvastatin  4.   Obstructive sleep apnea now on CPAP follows with Dr. Vega  5.  Diabetes mellitus  hemoglobin A1c 6.9 07/30/2019 followed by PCP  6.  Obesity his BMI is 39.5 he would benefit from diet modification,  routine exercise and weight loss  7.  Nonsustained ventricular tachycardia off amiodarone  Follow-up in 6 months call with questions or concerns    Addendum: Received labs LDL is now 81. He is not at goal discussed goal 70 or less.  Discussed possibly doubling his atorvastatin however patient is to start exercising routinely and would like to try diet modification in addition prior to increasing medicine.  Discussed target of 30 minutes moderate activity 5 days a week or 40 minutes 4 days a week.  Recommended repeat lipid panel in 6 months if not at goal at that time to double atorvastatin he is agreeable to that.      It has been a pleasure to participate in this patient's care.      Thank you,  KATHY Ojeda      **I used Dragon to dictate this note:**

## 2019-12-16 NOTE — TELEPHONE ENCOUNTER
I called patient to inform his Dr. Serrato's office does not have any results of Lipid Panel being done recently.  He states we ordered the Lipid Panel.  I will call [facility] ([phone]) tomorrow to see if the results are available to send to to us. / SHERRIE

## 2019-12-17 NOTE — TELEPHONE ENCOUNTER
I contacted [email or web address] to obtain the Lipid panel results.  I have placed these in your inbox for review.  / SHERRIE

## 2020-01-24 ENCOUNTER — HOSPITAL ENCOUNTER (OUTPATIENT)
Dept: SLEEP MEDICINE | Facility: HOSPITAL | Age: 55
Discharge: HOME | End: 2020-01-24
Attending: INTERNAL MEDICINE

## 2020-07-24 ENCOUNTER — HOSPITAL ENCOUNTER (OUTPATIENT)
Dept: SLEEP MEDICINE | Facility: HOSPITAL | Age: 55
Discharge: HOME | End: 2020-07-24
Attending: INTERNAL MEDICINE

## 2020-08-12 ENCOUNTER — OFFICE VISIT (OUTPATIENT)
Dept: CARDIOLOGY | Facility: CLINIC | Age: 55
End: 2020-08-12

## 2020-08-12 VITALS
DIASTOLIC BLOOD PRESSURE: 80 MMHG | BODY MASS INDEX: 40.08 KG/M2 | HEART RATE: 75 BPM | SYSTOLIC BLOOD PRESSURE: 132 MMHG | HEIGHT: 69 IN | WEIGHT: 270.6 LBS

## 2020-08-12 DIAGNOSIS — I25.810 CORONARY ARTERY DISEASE INVOLVING CORONARY BYPASS GRAFT OF NATIVE HEART WITHOUT ANGINA PECTORIS: Primary | ICD-10-CM

## 2020-08-12 DIAGNOSIS — I10 ESSENTIAL HYPERTENSION: ICD-10-CM

## 2020-08-12 DIAGNOSIS — E11.59 TYPE 2 DIABETES MELLITUS WITH OTHER CIRCULATORY COMPLICATION, WITHOUT LONG-TERM CURRENT USE OF INSULIN (HCC): ICD-10-CM

## 2020-08-12 DIAGNOSIS — E78.2 MIXED HYPERLIPIDEMIA: ICD-10-CM

## 2020-08-12 PROCEDURE — 93000 ELECTROCARDIOGRAM COMPLETE: CPT | Performed by: INTERNAL MEDICINE

## 2020-08-12 PROCEDURE — 99214 OFFICE O/P EST MOD 30 MIN: CPT | Performed by: INTERNAL MEDICINE

## 2020-08-12 NOTE — PROGRESS NOTES
Date of Office Visit: 20  Encounter Provider: Tee Tong MD  Place of Service: [facility] CARDIOLOGY  Patient Name: Marco Singletary  :1965  Referral Provider:No ref. provider found      Chief Complaint   Patient presents with   • Follow-up     History of Present Illness  Marco Singletary is a 55 y.o. male  with history of coronary artery disease, myocardial infarction status post coronary artery bypass grafting x5, nonsustained ventricular tachycardia edema, diabetes mellitus, sleep apnea and obesity.  He presented with acute inferior STEMI on 2019 was found to have severe three-vessel disease with an EF of 30%.  He had CABG x5 with LIMA to distal LAD, saphenous vein to diagonal, saphenous vein to OM 2, saphenous vein sequential to PDA and left ventricular branch. Intra op ERICA EF 40 %.  He had some nonsustained ventricular tachycardia so he was treated with amiodarone and transition to p.o. amiodarone.  Before being extubated in recovery he vomited he was treated with broad-spectrum antibiotic for possible aspiration.  Later that evening he was able to be successfully extubated. he was found to have diabetes mellitus.  He was weaned off all vasopressors and was able to be started on beta-blockade.  His LDL was 179 and he was started on atorvastatin 40mg.  His carotid duplex showed mild bilateral stenosis.   He comes in for follow-up. The patient denies chest pain, pressure and heaviness. No shortness of breath, othopnea or PND. No palpitations, near syncope or syncope. No stroke type symptoms like paralysis, paresthesia, abrupt vision loss and dysarthria. No bleeding like blood in the stool or dark stools.  I am a little concerned when asked about his diabetes he said he did not know he had diabetes.  He also has this lump at the bottom of his sternum.  He is exercising riding a bike 30 to 40 minutes 3 days a week.        Past Medical History:   Diagnosis Date    • 3-vessel CAD     severe, now  s/p CABG   • GERD (gastroesophageal reflux disease)    • HLD (hyperlipidemia)    • Hypertension    • Leukocytosis     post-op   • Obesity    • STEMI (ST elevation myocardial infarction) (CMS/HCC)          Past Surgical History:   Procedure Laterality Date   • CORONARY ARTERY BYPASS GRAFT N/A 8/1/2019    Procedure: ERICA STERNOTOMY CORONARY ARTERY BYPASS GRAFT TIMES 5 USING LEFT INTERNAL MAMMARY ARTERY AND LEFT GREATER SAPHENOUS VEIN GRAFT PER ENDOSCOPIC VEIN HARVESTING AND PRP;  Surgeon: Christiano Zapien MD;  Location: [facility];  Service: Cardiothoracic   • HERNIA REPAIR           Current Outpatient Medications on File Prior to Visit   Medication Sig Dispense Refill   • amLODIPine (NORVASC) 2.5 MG tablet Take 1 tablet by mouth Daily. 90 tablet 3   • aspirin 81 MG chewable tablet Chew 81 mg Daily.     • atorvastatin (LIPITOR) 40 MG tablet Take 1 tablet by mouth Every Night. 90 tablet 3   • eszopiclone (LUNESTA) 2 MG tablet Take 2 mg by mouth Every Night. Take immediately before bedtime     • losartan (COZAAR) 100 MG tablet Take 1 tablet by mouth Daily. 90 tablet 3   • metoprolol tartrate (LOPRESSOR) 50 MG tablet Take 1.5 tablets by mouth 2 (Two) Times a Day. 270 tablet 3   • [DISCONTINUED] Fluticasone Propionate (FLONASE NA) into the nostril(s) as directed by provider Daily.       No current facility-administered medications on file prior to visit.          Social History     Socioeconomic History   • Marital status:      Spouse name: Not on file   • Number of children: Not on file   • Years of education: Not on file   • Highest education level: Not on file   Tobacco Use   • Smoking status: Never Smoker   • Smokeless tobacco: Never Used   • Tobacco comment: caffeine use - 1 cup coffee daily   Substance and Sexual Activity   • Alcohol use: No   • Drug use: No   • Sexual activity: Defer   Lifestyle   • Physical activity:     Days per week: 4 days     Minutes per session:  "40 min   • Stress: Not at all       Family History   Problem Relation Age of Onset   • Dysrhythmia Father    • Hypertension Father    • Hypertension Mother    • Hypertension Maternal Grandmother    • Hypertension Maternal Grandfather          Review of Systems   Constitution: Negative for decreased appetite, diaphoresis, fever, malaise/fatigue, weight gain and weight loss.   HENT: Negative for congestion, hearing loss, nosebleeds and tinnitus.    Eyes: Negative for blurred vision, double vision, vision loss in left eye, vision loss in right eye and visual disturbance.   Cardiovascular:        As noted in HPI   Respiratory:        As noted HPI   Endocrine: Negative for cold intolerance and heat intolerance.   Hematologic/Lymphatic: Negative for bleeding problem. Does not bruise/bleed easily.   Skin: Negative for color change, flushing, itching and rash.   Musculoskeletal: Negative for arthritis, back pain, joint pain, joint swelling, muscle weakness and myalgias.   Gastrointestinal: Negative for bloating, abdominal pain, constipation, diarrhea, dysphagia, heartburn, hematemesis, hematochezia, melena, nausea and vomiting.   Genitourinary: Negative for bladder incontinence, dysuria, frequency, nocturia and urgency.   Neurological: Negative for dizziness, focal weakness, headaches, light-headedness, loss of balance, numbness, paresthesias, vertigo and weakness.   Psychiatric/Behavioral: Negative for depression, memory loss and substance abuse.       Procedures      ECG 12 Lead  Date/Time: 8/12/2020 2:37 PM  Performed by: Tee Tong MD  Authorized by: Tee Tong MD   Comparison: compared with previous ECG   Similar to previous ECG  Rhythm: sinus rhythm  Rate: normal  Q wave noted on lead: Old inferior MI.    QRS axis: normal                  Objective:    /80 (BP Location: Right arm)   Pulse 75   Ht 175.3 cm (69\")   Wt 123 kg (270 lb 9.6 oz)   BMI 39.96 kg/m²        Physical Exam  Physical Exam "   Constitutional: He is oriented to person, place, and time. He appears well-developed and well-nourished. No distress.   HENT:   Head: Normocephalic.   Eyes: Pupils are equal, round, and reactive to light. Conjunctivae are normal. No scleral icterus.   Neck: Normal carotid pulses, no hepatojugular reflux and no JVD present. Carotid bruit is not present. No tracheal deviation, no edema and no erythema present. No thyromegaly present.   Cardiovascular: Normal rate, regular rhythm, S1 normal, S2 normal, normal heart sounds and intact distal pulses.  No extrasystoles are present. PMI is not displaced. Exam reveals no gallop, no distant heart sounds and no friction rub.   No murmur heard.  Pulses:       Carotid pulses are 2+ on the right side, and 2+ on the left side.       Radial pulses are 2+ on the right side, and 2+ on the left side.        Femoral pulses are 2+ on the right side, and 2+ on the left side.       Dorsalis pedis pulses are 2+ on the right side, and 2+ on the left side.        Posterior tibial pulses are 2+ on the right side, and 2+ on the left side.   Pulmonary/Chest: Effort normal and breath sounds normal. No respiratory distress. He has no decreased breath sounds. He has no wheezes. He has no rhonchi. He has no rales. He exhibits no tenderness.   Abdominal: Soft. Bowel sounds are normal. He exhibits no distension and no mass. There is no hepatosplenomegaly. There is no tenderness. There is no rebound and no guarding.   Musculoskeletal: He exhibits no edema, tenderness or deformity.   Neurological: He is alert and oriented to person, place, and time.   Skin: Skin is warm and dry. No rash noted. He is not diaphoretic. No cyanosis or erythema. No pallor. Nails show no clubbing.   Psychiatric: He has a normal mood and affect. His speech is normal and behavior is normal. Judgment and thought content normal.           Assessment:   1.  This is a 54-year-old gentleman with history of severe three-vessel  coronary artery disease, ejection fraction of 30% status post coronary artery bypass grafting x5 on 8/1/2019 with LIMA to distal LAD, saphenous vein to diagonal, saphenous vein to OM 2, saphenous vein sequential to PDA and left ventricular branch.  Postop echo ejection fraction up to 50-55%.  No angina or heart failure he will continue the same see us in follow-up in a year.  2.  Hypertension.    Blood pressure at goal.  3.  Hyperlipidemia now on atorvastatin.    He has not had lipids done.  However he needs those.  If he is not at LDL 70 or less need to go to 80 mg a day.  4.  Probable obstructive sleep apnea to have an outpatient study not sure he had that done.  5.  Diabetes mellitus.   Last hemoglobin A1c was almost 7.  He needs to establish with family doctor in get this under control.   6.  Nonsustained ventricular tachycardia.  7.  Obesity his BMI is nearly 40.         Plan:

## 2020-11-05 RX ORDER — METOPROLOL TARTRATE 50 MG/1
TABLET, FILM COATED ORAL
Qty: 270 TABLET | Refills: 2 | Status: SHIPPED | OUTPATIENT
Start: 2020-11-05 | End: 2021-08-12 | Stop reason: SDUPTHER

## 2020-11-05 RX ORDER — LOSARTAN POTASSIUM 100 MG/1
TABLET ORAL
Qty: 90 TABLET | Refills: 2 | Status: SHIPPED | OUTPATIENT
Start: 2020-11-05 | End: 2021-08-12 | Stop reason: SDUPTHER

## 2020-11-05 RX ORDER — ATORVASTATIN CALCIUM 40 MG/1
TABLET, FILM COATED ORAL
Qty: 90 TABLET | Refills: 0 | Status: SHIPPED | OUTPATIENT
Start: 2020-11-05 | End: 2021-02-10 | Stop reason: SDUPTHER

## 2020-11-05 RX ORDER — AMLODIPINE BESYLATE 2.5 MG/1
TABLET ORAL
Qty: 90 TABLET | Refills: 2 | Status: SHIPPED | OUTPATIENT
Start: 2020-11-05 | End: 2021-08-12 | Stop reason: SDUPTHER

## 2021-02-11 RX ORDER — ATORVASTATIN CALCIUM 40 MG/1
TABLET, FILM COATED ORAL
Qty: 90 TABLET | Refills: 3 | Status: SHIPPED | OUTPATIENT
Start: 2021-02-11 | End: 2021-02-12 | Stop reason: SDUPTHER

## 2021-02-15 RX ORDER — ATORVASTATIN CALCIUM 40 MG/1
TABLET, FILM COATED ORAL
Qty: 90 TABLET | Refills: 3 | Status: SHIPPED | OUTPATIENT
Start: 2021-02-15 | End: 2021-08-12

## 2021-08-12 ENCOUNTER — OFFICE VISIT (OUTPATIENT)
Dept: CARDIOLOGY | Facility: CLINIC | Age: 56
End: 2021-08-12

## 2021-08-12 VITALS
HEIGHT: 69 IN | BODY MASS INDEX: 39.99 KG/M2 | WEIGHT: 270 LBS | DIASTOLIC BLOOD PRESSURE: 70 MMHG | SYSTOLIC BLOOD PRESSURE: 130 MMHG | HEART RATE: 69 BPM

## 2021-08-12 DIAGNOSIS — Z51.81 ENCOUNTER FOR THERAPEUTIC DRUG LEVEL MONITORING: ICD-10-CM

## 2021-08-12 DIAGNOSIS — I10 ESSENTIAL HYPERTENSION: ICD-10-CM

## 2021-08-12 DIAGNOSIS — E78.2 MIXED HYPERLIPIDEMIA: ICD-10-CM

## 2021-08-12 DIAGNOSIS — Z95.1 S/P CABG X 5: ICD-10-CM

## 2021-08-12 DIAGNOSIS — I25.810 CORONARY ARTERY DISEASE INVOLVING CORONARY BYPASS GRAFT OF NATIVE HEART WITHOUT ANGINA PECTORIS: Primary | ICD-10-CM

## 2021-08-12 DIAGNOSIS — Z00.00 HEALTHCARE MAINTENANCE: ICD-10-CM

## 2021-08-12 DIAGNOSIS — E11.8 TYPE 2 DIABETES MELLITUS WITH COMPLICATION, WITHOUT LONG-TERM CURRENT USE OF INSULIN (HCC): ICD-10-CM

## 2021-08-12 DIAGNOSIS — I47.29 NONSUSTAINED VENTRICULAR TACHYCARDIA (HCC): ICD-10-CM

## 2021-08-12 PROCEDURE — 93000 ELECTROCARDIOGRAM COMPLETE: CPT | Performed by: NURSE PRACTITIONER

## 2021-08-12 PROCEDURE — 99214 OFFICE O/P EST MOD 30 MIN: CPT | Performed by: NURSE PRACTITIONER

## 2021-08-12 RX ORDER — METOPROLOL TARTRATE 50 MG/1
75 TABLET, FILM COATED ORAL 2 TIMES DAILY
Qty: 270 TABLET | Refills: 3 | Status: SHIPPED | OUTPATIENT
Start: 2021-08-12 | End: 2022-07-28

## 2021-08-12 RX ORDER — AMLODIPINE BESYLATE 2.5 MG/1
2.5 TABLET ORAL DAILY
Qty: 90 TABLET | Refills: 3 | Status: SHIPPED | OUTPATIENT
Start: 2021-08-12 | End: 2022-07-28

## 2021-08-12 RX ORDER — LOSARTAN POTASSIUM 100 MG/1
100 TABLET ORAL DAILY
Qty: 90 TABLET | Refills: 3 | Status: SHIPPED | OUTPATIENT
Start: 2021-08-12 | End: 2022-07-28

## 2021-08-12 NOTE — PROGRESS NOTES
Date of Office Visit: 21  Encounter Provider: KATHY Ojeda  Place of Service: [facility] CARDIOLOGY  Patient Name: Marco Singletary  :1965    Chief Complaint   Patient presents with   • Coronary artery disease involving coronary bypass graft of n   • Follow-up   :     HPI: Marco Singletary is a 56 y.o. male  with history of coronary artery disease, myocardial infarction status post coronary artery bypass grafting x5, nonsustained ventricular tachycardia edema, diabetes mellitus, sleep apnea and obesity.  He was followed by Dr. Tong.  I will see him in follow-up today and have reviewed his medical record.        He presented with acute inferior STEMI was found to have severe three-vessel disease with an EF of 30%.  He had CABG x5 on 2019 with LIMA to distal LAD, saphenous vein to diagonal, saphenous vein to OM 2, saphenous vein sequential to PDA and left ventricular branch. Intra op ERICA EF 40 %.  He had some nonsustained ventricular tachycardia so he was treated with amiodarone and transition to p.o. amiodarone.  Before being extubated in recovery he vomited he was treated with broad-spectrum antibiotic for possible aspiration.  Later that evening he was able to be successfully extubated. he was found to have diabetes mellitus.  He was weaned off all vasopressors and was able to be started on beta-blockade.  His LDL was 179 and he was started on atorvastatin 40mg.  His carotid duplex showed mild bilateral stenosis. He had an uneventful course and was discharged on postoperative day 4 with [facility].He was discharged home on Aspirin, stain, beta blocker, a 5 day course of Lasix/Potassium, and Amoxicillin to complete a full 10 day course of antibiotics.    His postoperative echocardiogram showed his ejection fraction up to 51-55% with mild concentric hypertrophy and no significant valvular issues.  Amiodarone was weaned outpatient.  He had some  "elevated blood pressure so we stopped lisinopril and started losartan 100 mg daily and increased his metoprolol.  Amlodipine was later added and he obtain controlled blood pressure values after that.  He ultimately started using CPAP.     He presents today for annual reassessment.  He has been doing well.  He is walking and alternating with bicycling 45 minutes 3 times a week.  He has no chest pain tightness pressure, shortness of breath, near-syncope or syncope.  Has not had any labs.  He has not seen his primary care doctor in over a year and requested to see someone in the [facility] system.          Allergies   Allergen Reactions   • Bee Venom Swelling           Family and social history reviewed.     ROS  All other systems were reviewed and are negative          Objective:     Vitals:    08/12/21 1322   BP: 130/70   BP Location: Left arm   Patient Position: Sitting   Pulse: 69   Weight: 122 kg (270 lb)   Height: 175.3 cm (69\")     Body mass index is 39.87 kg/m².    PHYSICAL EXAM:  Constitutional:       General: Not in acute distress.     Appearance: Well-developed. Not diaphoretic.   HENT:      Head: Normocephalic.   Pulmonary:      Effort: Pulmonary effort is normal. No respiratory distress.      Breath sounds: Normal breath sounds. No wheezing. No rhonchi. No rales.   Cardiovascular:      Normal rate. Regular rhythm.   Pulses:     Radial: 2+ bilaterally.  Skin:     General: Skin is warm and dry. There is no cyanosis.      Findings: No rash.   Neurological:      Mental Status: Alert and oriented to person, place, and time.   Psychiatric:         Behavior: Behavior normal.         Thought Content: Thought content normal.         Judgment: Judgment normal.           ECG 12 Lead    Date/Time: 8/12/2021 2:11 PM  Performed by: Annamaria Young APRN  Authorized by: Annamaria Young APRN   Comparison: compared with previous ECG   Similar to previous ECG  Rate: normal  Q waves: III and aVF    T inversion: III  T flattening: " aVF  QRS axis: normal                Current Outpatient Medications   Medication Sig Dispense Refill   • amLODIPine (NORVASC) 2.5 MG tablet Take 1 tablet by mouth Daily. 90 tablet 3   • aspirin 81 MG chewable tablet Chew 81 mg Daily.     • eszopiclone (LUNESTA) 2 MG tablet Take 2 mg by mouth Every Night. Take immediately before bedtime     • losartan (COZAAR) 100 MG tablet Take 1 tablet by mouth Daily. 90 tablet 3   • metoprolol tartrate (LOPRESSOR) 50 MG tablet Take 1.5 tablets by mouth 2 (Two) Times a Day. 270 tablet 3     No current facility-administered medications for this visit.     Assessment:       Diagnosis Plan   1. Coronary artery disease involving coronary bypass graft of native heart without angina pectoris  Hemoglobin A1c    Lipid Panel   2. Mixed hyperlipidemia     3. Essential hypertension  Comprehensive Metabolic Panel    CBC (No Diff)    Hemoglobin A1c    Lipid Panel   4. Nonsustained ventricular tachycardia (CMS/HCC)     5. S/P CABG x 5     6. Type 2 diabetes mellitus with complication, without long-term current use of insulin (CMS/HCC)     7. Encounter for therapeutic drug level monitoring  Comprehensive Metabolic Panel    CBC (No Diff)    Hemoglobin A1c    Lipid Panel        Orders Placed This Encounter   Procedures   • Comprehensive Metabolic Panel     Standing Status:   Future     Standing Expiration Date:   8/13/2022     Order Specific Question:   Release to patient     Answer:   Immediate   • CBC (No Diff)     Standing Status:   Future     Standing Expiration Date:   8/12/2022     Order Specific Question:   Release to patient     Answer:   Immediate   • Hemoglobin A1c     Standing Status:   Future     Standing Expiration Date:   8/12/2022     Order Specific Question:   Release to patient     Answer:   Immediate   • Lipid Panel     Standing Status:   Future     Standing Expiration Date:   8/12/2022     Order Specific Question:   Release to patient     Answer:   Immediate         Plan:      1.  This is a 56-year-old gentleman with history of severe three-vessel coronary artery disease, ejection fraction of 30% status post coronary artery bypass grafting x5 on 8/1/2019 with LIMA to distal LAD, saphenous vein to diagonal, saphenous vein to OM 2, saphenous vein sequential to PDA and left ventricular branch.  follow up echo 08/2019 ejection fraction up to 50-55%  -Doing well.  Continues to exercise routinely he is to call with any question or concern  2.  Hypertension diastolic blood pressure appears at goal continue current regimen  3.  Hyperlipidemia now on atorvastatin.  LDL goal 70 or less.  Is not any recent labs we will update those  4.   Obstructive sleep apnea now on CPAP follows with Dr. Vega  5.  Diabetes mellitus  hemoglobin A1c 6.9 07/30/2019 followed by PCP-as above update labs  6.  Obesity his BMI is  39.87.  Benefit from weight loss  7.  Nonsustained ventricular tachycardia he is stable off amiodarone continue beta-blocker  8.  Need for healthcare maintenance-he has requested to see a new [facility] primary care provider staff placed a referral  Follow-up in 1 year.  Call questions or concerns.          It has been a pleasure to participate in this patient's care.      Thank you,  KATHY Ojeda      **I used Dragon to dictate this note:**

## 2021-08-18 ENCOUNTER — OFFICE VISIT (OUTPATIENT)
Dept: FAMILY MEDICINE CLINIC | Facility: CLINIC | Age: 56
End: 2021-08-18

## 2021-08-18 ENCOUNTER — LAB (OUTPATIENT)
Dept: LAB | Facility: HOSPITAL | Age: 56
End: 2021-08-18

## 2021-08-18 VITALS
DIASTOLIC BLOOD PRESSURE: 98 MMHG | HEIGHT: 69 IN | HEART RATE: 71 BPM | SYSTOLIC BLOOD PRESSURE: 151 MMHG | OXYGEN SATURATION: 98 % | WEIGHT: 270 LBS | BODY MASS INDEX: 39.99 KG/M2

## 2021-08-18 DIAGNOSIS — Z13.29 SCREENING FOR THYROID DISORDER: ICD-10-CM

## 2021-08-18 DIAGNOSIS — E11.65 TYPE 2 DIABETES MELLITUS WITH HYPERGLYCEMIA, WITHOUT LONG-TERM CURRENT USE OF INSULIN (HCC): ICD-10-CM

## 2021-08-18 DIAGNOSIS — E78.2 MIXED HYPERLIPIDEMIA: ICD-10-CM

## 2021-08-18 DIAGNOSIS — Z51.81 ENCOUNTER FOR THERAPEUTIC DRUG LEVEL MONITORING: ICD-10-CM

## 2021-08-18 DIAGNOSIS — I25.810 CORONARY ARTERY DISEASE INVOLVING CORONARY BYPASS GRAFT OF NATIVE HEART WITHOUT ANGINA PECTORIS: ICD-10-CM

## 2021-08-18 DIAGNOSIS — Z12.5 SCREENING FOR PROSTATE CANCER: ICD-10-CM

## 2021-08-18 DIAGNOSIS — I10 ESSENTIAL HYPERTENSION: ICD-10-CM

## 2021-08-18 DIAGNOSIS — Z00.00 ENCOUNTER FOR MEDICAL EXAMINATION TO ESTABLISH CARE: ICD-10-CM

## 2021-08-18 DIAGNOSIS — I10 ESSENTIAL HYPERTENSION: Primary | ICD-10-CM

## 2021-08-18 PROBLEM — I50.31 ACUTE DIASTOLIC CHF (CONGESTIVE HEART FAILURE) (HCC): Status: ACTIVE | Noted: 2021-08-18

## 2021-08-18 LAB
ALBUMIN SERPL-MCNC: 4.5 G/DL (ref 3.5–5.2)
ALBUMIN UR-MCNC: 2.1 MG/DL
ALBUMIN/GLOB SERPL: 1.6 G/DL
ALP SERPL-CCNC: 85 U/L (ref 39–117)
ALT SERPL W P-5'-P-CCNC: 25 U/L (ref 1–41)
ANION GAP SERPL CALCULATED.3IONS-SCNC: 12.9 MMOL/L (ref 5–15)
AST SERPL-CCNC: 18 U/L (ref 1–40)
BASOPHILS # BLD AUTO: 0.07 10*3/MM3 (ref 0–0.2)
BASOPHILS NFR BLD AUTO: 0.8 % (ref 0–1.5)
BILIRUB SERPL-MCNC: 0.7 MG/DL (ref 0–1.2)
BUN SERPL-MCNC: 11 MG/DL (ref 6–20)
BUN/CREAT SERPL: 12.4 (ref 7–25)
CALCIUM SPEC-SCNC: 9 MG/DL (ref 8.6–10.5)
CHLORIDE SERPL-SCNC: 103 MMOL/L (ref 98–107)
CHOLEST SERPL-MCNC: 153 MG/DL (ref 0–200)
CO2 SERPL-SCNC: 20.1 MMOL/L (ref 22–29)
CREAT SERPL-MCNC: 0.89 MG/DL (ref 0.76–1.27)
DEPRECATED RDW RBC AUTO: 40.1 FL (ref 37–54)
EOSINOPHIL # BLD AUTO: 0.18 10*3/MM3 (ref 0–0.4)
EOSINOPHIL NFR BLD AUTO: 2.2 % (ref 0.3–6.2)
ERYTHROCYTE [DISTWIDTH] IN BLOOD BY AUTOMATED COUNT: 13.2 % (ref 12.3–15.4)
GFR SERPL CREATININE-BSD FRML MDRD: 88 ML/MIN/1.73
GLOBULIN UR ELPH-MCNC: 2.8 GM/DL
GLUCOSE SERPL-MCNC: 335 MG/DL (ref 65–99)
HBA1C MFR BLD: 10.8 % (ref 4.8–5.6)
HCT VFR BLD AUTO: 43.8 % (ref 37.5–51)
HDLC SERPL-MCNC: 31 MG/DL (ref 40–60)
HGB BLD-MCNC: 14.6 G/DL (ref 13–17.7)
IMM GRANULOCYTES # BLD AUTO: 0.08 10*3/MM3 (ref 0–0.05)
IMM GRANULOCYTES NFR BLD AUTO: 1 % (ref 0–0.5)
LDLC SERPL CALC-MCNC: 77 MG/DL (ref 0–100)
LDLC/HDLC SERPL: 2.16 {RATIO}
LYMPHOCYTES # BLD AUTO: 2.05 10*3/MM3 (ref 0.7–3.1)
LYMPHOCYTES NFR BLD AUTO: 24.7 % (ref 19.6–45.3)
MCH RBC QN AUTO: 28.2 PG (ref 26.6–33)
MCHC RBC AUTO-ENTMCNC: 33.3 G/DL (ref 31.5–35.7)
MCV RBC AUTO: 84.6 FL (ref 79–97)
MONOCYTES # BLD AUTO: 0.65 10*3/MM3 (ref 0.1–0.9)
MONOCYTES NFR BLD AUTO: 7.8 % (ref 5–12)
NEUTROPHILS NFR BLD AUTO: 5.26 10*3/MM3 (ref 1.7–7)
NEUTROPHILS NFR BLD AUTO: 63.5 % (ref 42.7–76)
NRBC BLD AUTO-RTO: 0 /100 WBC (ref 0–0.2)
PLATELET # BLD AUTO: 234 10*3/MM3 (ref 140–450)
PMV BLD AUTO: 11.1 FL (ref 6–12)
POTASSIUM SERPL-SCNC: 4.2 MMOL/L (ref 3.5–5.2)
PROT SERPL-MCNC: 7.3 G/DL (ref 6–8.5)
PSA SERPL-MCNC: 1.23 NG/ML (ref 0–4)
RBC # BLD AUTO: 5.18 10*6/MM3 (ref 4.14–5.8)
SODIUM SERPL-SCNC: 136 MMOL/L (ref 136–145)
T4 FREE SERPL-MCNC: 1.37 NG/DL (ref 0.93–1.7)
TRIGL SERPL-MCNC: 275 MG/DL (ref 0–150)
TSH SERPL DL<=0.05 MIU/L-ACNC: 1.43 UIU/ML (ref 0.27–4.2)
VLDLC SERPL-MCNC: 45 MG/DL (ref 5–40)
WBC # BLD AUTO: 8.29 10*3/MM3 (ref 3.4–10.8)

## 2021-08-18 PROCEDURE — 84443 ASSAY THYROID STIM HORMONE: CPT

## 2021-08-18 PROCEDURE — 85025 COMPLETE CBC W/AUTO DIFF WBC: CPT

## 2021-08-18 PROCEDURE — 80053 COMPREHEN METABOLIC PANEL: CPT

## 2021-08-18 PROCEDURE — G0103 PSA SCREENING: HCPCS

## 2021-08-18 PROCEDURE — 82043 UR ALBUMIN QUANTITATIVE: CPT

## 2021-08-18 PROCEDURE — 80061 LIPID PANEL: CPT

## 2021-08-18 PROCEDURE — 84439 ASSAY OF FREE THYROXINE: CPT

## 2021-08-18 PROCEDURE — 36415 COLL VENOUS BLD VENIPUNCTURE: CPT

## 2021-08-18 PROCEDURE — 83036 HEMOGLOBIN GLYCOSYLATED A1C: CPT

## 2021-08-18 PROCEDURE — 99214 OFFICE O/P EST MOD 30 MIN: CPT | Performed by: NURSE PRACTITIONER

## 2021-08-18 RX ORDER — ATORVASTATIN CALCIUM 40 MG/1
40 TABLET, FILM COATED ORAL DAILY
COMMUNITY
End: 2022-02-01

## 2021-08-18 NOTE — PROGRESS NOTES
Chief Complaint  Hypertension and Establish Care    Subjective            Marco Singletary presents to [facility]  Pt is here to establish care. Pt is a former pt of KATHY Hedrick, he states he only saw her 1 time in 2019.. Pt also seeing  KATHY Odom for cardiology in [place]. Notes in chart. He reports recently diagnosed with DM II but has not had recent A1c, will go today.  He is not on any medicaiton for DM II.  He denies any concerns.    Pt is due labs. Pt has labs from cardiology as well.     Pt is due DM foot and DM eye exam.         PMH  Past Medical History:   Diagnosis Date   • 3-vessel CAD     severe, now  s/p CABG   • GERD (gastroesophageal reflux disease)    • HLD (hyperlipidemia)    • Hypertension    • Leukocytosis     post-op   • Obesity    • STEMI (ST elevation myocardial infarction) (CMS/[facility])        ALLERGY  Allergies   Allergen Reactions   • Bee Venom Swelling        SURGICALHX  Past Surgical History:   Procedure Laterality Date   • CORONARY ARTERY BYPASS GRAFT N/A 8/1/2019    Procedure: ERICA STERNOTOMY CORONARY ARTERY BYPASS GRAFT TIMES 5 USING LEFT INTERNAL MAMMARY ARTERY AND LEFT GREATER SAPHENOUS VEIN GRAFT PER ENDOSCOPIC VEIN HARVESTING AND PRP;  Surgeon: Christiano Zapien MD;  Location: [facility];  Service: Cardiothoracic   • HERNIA REPAIR          SOCX  Social History     Tobacco Use   • Smoking status: Never Smoker   • Smokeless tobacco: Never Used   • Tobacco comment: caffeine use - 1 cup coffee daily   Substance Use Topics   • Alcohol use: No       FAMHX  Family History   Problem Relation Age of Onset   • Dysrhythmia Father    • Hypertension Father    • Hypertension Mother    • Hypertension Maternal Grandmother    • Hypertension Maternal Grandfather         MEDSIGONLY  Current Outpatient Medications on File Prior to Visit   Medication Sig   • amLODIPine (NORVASC) 2.5 MG tablet Take 1 tablet by mouth Daily.   • aspirin 81 MG chewable  "tablet Chew 81 mg Daily.   • atorvastatin (LIPITOR) 40 MG tablet Take 40 mg by mouth Daily.   • eszopiclone (LUNESTA) 2 MG tablet Take 2 mg by mouth Every Night. Take immediately before bedtime   • losartan (COZAAR) 100 MG tablet Take 1 tablet by mouth Daily.   • metoprolol tartrate (LOPRESSOR) 50 MG tablet Take 1.5 tablets by mouth 2 (Two) Times a Day.     No current facility-administered medications on file prior to visit.       Health Maintenance Due   Topic Date Due   • URINE MICROALBUMIN  Never done   • ANNUAL PHYSICAL  Never done   • Pneumococcal Vaccine 0-64 (1 of 2 - PPSV23) Never done   • Hepatitis B (1 of 3 - Risk 3-dose series) Never done   • ZOSTER VACCINE (1 of 2) Never done   • HEPATITIS C SCREENING  Never done   • DIABETIC FOOT EXAM  Never done   • DIABETIC EYE EXAM  Never done   • HEMOGLOBIN A1C  01/30/2020   • LIPID PANEL  07/30/2020       Objective     /98   Pulse 71   Ht 175.3 cm (69\")   Wt 122 kg (270 lb)   SpO2 98%   BMI 39.87 kg/m²       Physical Exam  Constitutional:       General: He is not in acute distress.     Appearance: Normal appearance. He is not ill-appearing.   HENT:      Head: Normocephalic and atraumatic.      Mouth/Throat:      Pharynx: No oropharyngeal exudate or posterior oropharyngeal erythema.   Cardiovascular:      Rate and Rhythm: Normal rate and regular rhythm.      Pulses: Normal pulses.           Dorsalis pedis pulses are 2+ on the right side and 2+ on the left side.      Heart sounds: Normal heart sounds. No murmur heard.     Pulmonary:      Effort: Pulmonary effort is normal. No respiratory distress.      Breath sounds: Normal breath sounds.   Chest:      Chest wall: No tenderness.   Abdominal:      General: Abdomen is flat. Bowel sounds are normal. There is no distension.      Palpations: Abdomen is soft. There is no mass.      Tenderness: There is no abdominal tenderness. There is no guarding.   Musculoskeletal:         General: No swelling or tenderness. " Normal range of motion.      Cervical back: Normal range of motion and neck supple.   Feet:      Right foot:      Protective Sensation: 3 sites tested. 3 sites sensed.      Skin integrity: Skin integrity normal. No ulcer or blister.      Toenail Condition: Right toenails are normal.      Left foot:      Protective Sensation: 3 sites tested. 3 sites sensed.      Skin integrity: Skin integrity normal. No ulcer or blister.      Toenail Condition: Left toenails are normal.      Comments:      Skin:     General: Skin is warm and dry.      Findings: No rash.   Neurological:      General: No focal deficit present.      Mental Status: He is alert and oriented to person, place, and time. Mental status is at baseline.      Gait: Gait normal.   Psychiatric:         Mood and Affect: Mood normal.         Behavior: Behavior normal.         Thought Content: Thought content normal.         Judgment: Judgment normal.           Result Review :                           Assessment and Plan        Diagnoses and all orders for this visit:    1. Essential hypertension (Primary)  Comments:  stable on Amolodipine 2.5mg, coozar 100mg and Metoprolol 75mg managed by Cardiology  Orders:  -     CBC w AUTO Differential; Future  -     Comprehensive metabolic panel; Future    2. Mixed hyperlipidemia  Comments:  stable on Lipitor 40mg q day    3. Encounter for medical examination to establish care    4. Type 2 diabetes mellitus with hyperglycemia, without long-term current use of insulin (CMS/[facility])  Comments:  Pt to make own appt for dilated eye exam  Orders:  -     MicroAlbumin, Urine, Random - Urine, Clean Catch; Future  -     Hemoglobin A1c; Future    5. Screening for prostate cancer  -     PSA SCREENING; Future    6. Screening for thyroid disorder  -     TSH; Future  -     T4, free; Future              Follow Up     Return in about 3 months (around 11/18/2021).    Patient was given instructions and counseling regarding his condition or for  health maintenance advice. Please see specific information pulled into the AVS if appropriate.     Marco Singletary  reports that he has never smoked. He has never used smokeless tobacco..

## 2021-08-19 ENCOUNTER — TELEPHONE (OUTPATIENT)
Dept: CARDIOLOGY | Facility: CLINIC | Age: 56
End: 2021-08-19

## 2021-08-19 ENCOUNTER — TELEPHONE (OUTPATIENT)
Dept: FAMILY MEDICINE CLINIC | Facility: CLINIC | Age: 56
End: 2021-08-19

## 2021-08-19 DIAGNOSIS — E11.65 TYPE 2 DIABETES MELLITUS WITH HYPERGLYCEMIA, UNSPECIFIED WHETHER LONG TERM INSULIN USE (HCC): Primary | ICD-10-CM

## 2021-08-19 NOTE — TELEPHONE ENCOUNTER
As listed below I just Spoke with patient.  Endocrinology referral is now in.  He verbalized understanding and is agreeable to see endocrinology.

## 2021-08-19 NOTE — TELEPHONE ENCOUNTER
----- Message from KATHY Vazquez sent at 8/18/2021  6:49 PM EDT -----  Hgb A1C is very high lets refer to Shirley Pickering for DM management

## 2021-08-19 NOTE — TELEPHONE ENCOUNTER
I called patient and spoke with him.  Emphasized need to aggressively address diabetes due to elevated hemoglobin A1c..     Thi- I did not see referral order on my end so I wanted to ensure that is completed    Shirley- I wanted to suggest Jardiance or Farxiga as options for cardiovascular benefit.

## 2021-09-02 ENCOUNTER — OFFICE VISIT (OUTPATIENT)
Dept: DIABETES SERVICES | Facility: HOSPITAL | Age: 56
End: 2021-09-02

## 2021-09-02 VITALS
WEIGHT: 271.61 LBS | HEIGHT: 69 IN | OXYGEN SATURATION: 100 % | DIASTOLIC BLOOD PRESSURE: 93 MMHG | BODY MASS INDEX: 40.23 KG/M2 | SYSTOLIC BLOOD PRESSURE: 147 MMHG | TEMPERATURE: 99 F | HEART RATE: 78 BPM

## 2021-09-02 DIAGNOSIS — E11.65 UNCONTROLLED TYPE 2 DIABETES MELLITUS WITH HYPERGLYCEMIA (HCC): Primary | ICD-10-CM

## 2021-09-02 DIAGNOSIS — E66.01 SEVERE OBESITY (BMI >= 40) (HCC): ICD-10-CM

## 2021-09-02 LAB — GLUCOSE BLDC GLUCOMTR-MCNC: 278 MG/DL (ref 70–99)

## 2021-09-02 PROCEDURE — G0463 HOSPITAL OUTPT CLINIC VISIT: HCPCS | Performed by: NURSE PRACTITIONER

## 2021-09-02 PROCEDURE — 99214 OFFICE O/P EST MOD 30 MIN: CPT | Performed by: NURSE PRACTITIONER

## 2021-09-02 PROCEDURE — 82962 GLUCOSE BLOOD TEST: CPT | Performed by: NURSE PRACTITIONER

## 2021-09-02 RX ORDER — LANCETS 30 GAUGE
1 EACH MISCELLANEOUS 2 TIMES DAILY
Qty: 50 EACH | Refills: 5 | Status: SHIPPED | OUTPATIENT
Start: 2021-09-02

## 2021-09-02 NOTE — PROGRESS NOTES
"Chief Complaint  Diabetes (new pt, est care, knows nothing about diabetes wants to be educated on this,)    Referred By: KATHY Vazquez    Subjective          Marco Singletary presents to [facility] for diabetes medication management    History of Present Illness    Visit type:  an initial evaluation  Diabetes type:  Type 2  Age at time of diagnosis/Number of years: Patient indicates he was prediabetic in 2019; found out he had switched to diabetes on 8/18/21  Current diabetes status/concerns/issues: He states he had labs done recently in was told that he now had type 2 diabetes. Other health concerns: none  Diabetes symptoms:  none reported at this time  Diabetes complications:  None  Hospitalizations secondary to DM?  No  ER/911 Secondary to Dm?  No  Hypoglycemia:  None reported at this time  Hypoglycemia Symptoms:  No hypoglycemia at this time  Current Diabetes treatment: He has not yet been started on any medications  Prior diabetes treatments:  none  Blood glucose device:  need meter prescribed  Blood glucose monitoring frequency:  None  Blood glucose range/average:  unknown  Diet:  \"Eat what I want\", drinks water and unsweet tea  Activity:  has not been exercising lately due to increased workload      Past Medical History:   Diagnosis Date   • 3-vessel CAD     severe, now  s/p CABG   • GERD (gastroesophageal reflux disease)    • HLD (hyperlipidemia)    • Hypertension    • Leukocytosis     post-op   • Obesity    • CHAD (obstructive sleep apnea)     with use of CPAP   • STEMI (ST elevation myocardial infarction) (CMS/[facility])      Past Surgical History:   Procedure Laterality Date   • CORONARY ARTERY BYPASS GRAFT N/A 8/1/2019    Procedure: ERICA STERNOTOMY CORONARY ARTERY BYPASS GRAFT TIMES 5 USING LEFT INTERNAL MAMMARY ARTERY AND LEFT GREATER SAPHENOUS VEIN GRAFT PER ENDOSCOPIC VEIN HARVESTING AND PRP;  Surgeon: Christiano Zapien MD;  Location: [facility];  Service: " Cardiothoracic   • HERNIA REPAIR Right     inguinal      Family History   Problem Relation Age of Onset   • Dysrhythmia Father    • Hypertension Father    • Hypertension Mother    • Hypertension Maternal Grandmother    • Hypertension Maternal Grandfather      Social History     Socioeconomic History   • Marital status:      Spouse name: Not on file   • Number of children: Not on file   • Years of education: Not on file   • Highest education level: Not on file   Tobacco Use   • Smoking status: Never Smoker   • Smokeless tobacco: Never Used   • Tobacco comment: caffeine use - 1 cup coffee daily   Vaping Use   • Vaping Use: Never used   Substance and Sexual Activity   • Alcohol use: No   • Drug use: No   • Sexual activity: Defer     Allergies   Allergen Reactions   • Bee Venom Swelling       Current Outpatient Medications:   •  amLODIPine (NORVASC) 2.5 MG tablet, Take 1 tablet by mouth Daily., Disp: 90 tablet, Rfl: 3  •  aspirin 81 MG chewable tablet, Chew 81 mg Daily., Disp: , Rfl:   •  atorvastatin (LIPITOR) 40 MG tablet, Take 40 mg by mouth Daily., Disp: , Rfl:   •  eszopiclone (LUNESTA) 2 MG tablet, Take 2 mg by mouth As Needed for Sleep. Take immediately before bedtime , Disp: , Rfl:   •  losartan (COZAAR) 100 MG tablet, Take 1 tablet by mouth Daily., Disp: 90 tablet, Rfl: 3  •  metoprolol tartrate (LOPRESSOR) 50 MG tablet, Take 1.5 tablets by mouth 2 (Two) Times a Day., Disp: 270 tablet, Rfl: 3  •  Blood Glucose Monitoring Suppl device, 1 each 1 (One) Time for 1 dose., Disp: 1 each, Rfl: 0  •  glucose blood test strip, Use as instructed, Disp: 50 each, Rfl: 5  •  Lancets misc, 1 each 2 (two) times a day., Disp: 50 each, Rfl: 5  •  metFORMIN (Glucophage) 500 MG tablet, Take 1 tablet by mouth 2 (Two) Times a Day With Meals., Disp: 60 tablet, Rfl: 3  •  SITagliptin (Januvia) 100 MG tablet, Take 1 tablet by mouth Daily., Disp: 30 tablet, Rfl: 3    Review of Systems   Constitutional: Negative for activity  "change, appetite change, fatigue, fever, unexpected weight gain and unexpected weight loss.   HENT: Negative for congestion, ear pain, facial swelling, hearing loss, sore throat and tinnitus.    Eyes: Negative for blurred vision, double vision, redness and visual disturbance.   Respiratory: Negative for cough, shortness of breath and wheezing.    Cardiovascular: Negative for chest pain, palpitations and leg swelling.   Gastrointestinal: Negative for abdominal distention, constipation, diarrhea, nausea, vomiting, GERD and indigestion.   Endocrine: Negative for polydipsia, polyphagia and polyuria.   Genitourinary: Negative for difficulty urinating, frequency and urgency.   Musculoskeletal: Positive for arthralgias and back pain. Negative for gait problem and myalgias.   Skin: Negative for rash, skin lesions and bruise.   Neurological: Negative for seizures, speech difficulty, weakness, headache and confusion.   Psychiatric/Behavioral: Negative for sleep disturbance, depressed mood and stress. The patient is not nervous/anxious.         Objective     Vitals:    09/02/21 1113   BP: 147/93   BP Location: Right arm   Patient Position: Sitting   Pulse: 78   Temp: 99 °F (37.2 °C)   SpO2: 100%   Weight: 123 kg (271 lb 9.7 oz)   Height: 175.3 cm (69\")   PainSc: 0-No pain     Body mass index is 40.11 kg/m².      Physical Exam  Constitutional:       Appearance: Normal appearance. He is obese.      Comments: Severe obesity with BMI of 40.11    HENT:      Head: Normocephalic and atraumatic.      Right Ear: External ear normal.      Left Ear: External ear normal.      Nose: Nose normal.   Eyes:      Extraocular Movements: Extraocular movements intact.      Conjunctiva/sclera: Conjunctivae normal.   Pulmonary:      Effort: Pulmonary effort is normal.   Musculoskeletal:         General: Normal range of motion.      Cervical back: Normal range of motion.   Skin:     General: Skin is warm and dry.   Neurological:      General: No " focal deficit present.      Mental Status: He is alert and oriented to person, place, and time. Mental status is at baseline.   Psychiatric:         Mood and Affect: Mood normal.         Behavior: Behavior normal.         Thought Content: Thought content normal.         Judgment: Judgment normal.         Result Review :   The following data was reviewed by: KATHY Thompson on 09/02/2021:        His most recent A1c was collected on 8/18/2021 was 10.8% indicating uncontrolled type 2 diabetes.  His only A1c available for comparison was collected on 7/30/2019 and was 6.9% at that time    Most Recent A1C    HGBA1C Most Recent 8/18/21   Hemoglobin A1C 10.80 (A)   (A) Abnormal value              A1C Last 3 Results    HGBA1C Last 3 Results 8/18/21   Hemoglobin A1C 10.80 (A)   (A) Abnormal value                    Assessment:  Diagnoses and all orders for this visit:    1. Uncontrolled type 2 diabetes mellitus with hyperglycemia (CMS/[facility]) (Primary)  -     Ambulatory Referral to Diabetes Care Clinic - [facility]  -     Ambulatory Referral to Diabetes Care Clinic - [facility]    Other orders  -     POC Glucose  -     Blood Glucose Monitoring Suppl device; 1 each 1 (One) Time for 1 dose.  Dispense: 1 each; Refill: 0  -     glucose blood test strip; Use as instructed  Dispense: 50 each; Refill: 5  -     Lancets misc; 1 each 2 (two) times a day.  Dispense: 50 each; Refill: 5  -     Discontinue: empagliflozin (Jardiance) 10 MG tablet tablet; Take 1 tablet by mouth Daily for 30 days.  Dispense: 30 tablet; Refill: 3  -     SITagliptin (Januvia) 100 MG tablet; Take 1 tablet by mouth Daily.  Dispense: 30 tablet; Refill: 3  -     metFORMIN (Glucophage) 500 MG tablet; Take 1 tablet by mouth 2 (Two) Times a Day With Meals.  Dispense: 60 tablet; Refill: 3        Plan: Initially during the visit the patient was prescribed Jardiance however we received immediate notification that his insurance would not cover this medication and was he  had a failed trial on metformin therefore the Jardiance was discontinued and the patient was prescribed Metformin 500 mg twice a day.  We will also use Januvia 100 mg once a day.  The patient will be scheduled for diabetes education with both the diabetes nurse educator and the dietitian for nutrition counseling.  He was advised of the potential adverse reactions of these medications and was instructed to call our office should he experience any of those.  He was prescribed a blood glucose meter with supplies.    The patient will monitor his blood glucose levels 2 times each day and record for review at his follow-up appointment.  If he develops hypoglycemia or experiences any increased frequency or severity of hypoglycemia, he will contact the office for further instructions.        Follow Up     Return for DSME, DMNT, Medication Management.    Patient was given instructions and counseling regarding his condition or for health maintenance advice. Please see specific information pulled into the AVS if appropriate.     Shirley Pickering, KATHY  09/02/2021

## 2021-09-07 ENCOUNTER — EDUCATION (OUTPATIENT)
Dept: DIABETES SERVICES | Facility: HOSPITAL | Age: 56
End: 2021-09-07

## 2021-09-07 DIAGNOSIS — IMO0002 DIABETES MELLITUS TYPE 2, UNCONTROLLED, WITH COMPLICATIONS: Primary | ICD-10-CM

## 2021-09-07 PROCEDURE — G0108 DIAB MANAGE TRN  PER INDIV: HCPCS

## 2021-09-07 NOTE — PROGRESS NOTES
Initial Visit and Education Plan:  Marco Singletary 56 y.o. presented to [facility] for initial care.  Patient states the reason for their visit is to learn more about diabetes, is referred by Shirley Pickering A*.     Ht: 69 inches    Wt: 271 pounds    Allergies   Allergen Reactions   • Bee Venom Swelling        LABS:  Lab Results (most recent)     Hemoglobin A1c 10.8         Labs reviewed with patient.    Past Medical History:   Diagnosis Date   • 3-vessel CAD     severe, now  s/p CABG   • GERD (gastroesophageal reflux disease)    • HLD (hyperlipidemia)    • Hypertension    • Leukocytosis     post-op   • Obesity    • CHAD (obstructive sleep apnea)     with use of CPAP   • STEMI (ST elevation myocardial infarction) (CMS/[facility])         Past Surgical History:   • CORONARY ARTERY BYPASS GRAFT    Procedure: ERICA STERNOTOMY CORONARY ARTERY BYPASS GRAFT TIMES 5 USING LEFT INTERNAL MAMMARY ARTERY AND LEFT GREATER SAPHENOUS VEIN GRAFT PER ENDOSCOPIC VEIN HARVESTING AND PRP;  Surgeon: Christiano Zapien MD;  Location: [facility];  Service: Cardiothoracic   • HERNIA REPAIR    inguinal         Social History     Tobacco Use   • Smoking status: Never Smoker   • Smokeless tobacco: Never Used   • Tobacco comment: caffeine use - 1 cup coffee daily   Vaping Use   • Vaping Use: Never used   Substance Use Topics   • Alcohol use: No   • Drug use: No        Family History   Problem Relation Age of Onset   • Dysrhythmia Father    • Hypertension Father    • Hypertension Mother    • Hypertension Maternal Grandmother    • Hypertension Maternal Grandfather         Education Plan:       Blood Glucose Monitoring Instructions and Plan:   We reviewed blood glucose testing and ADA recommended blood glucose level for fasting, pre and post meals. Patient demonstrates understanding and importance of testing blood glucose 1-2 times a day; Patient will log BG results. Patient was given written material including blood  glucose log.     Initial Nutrition Instructions and Plan:   Patient was instructed and demonstrated reading a food label. Serving size and carbohydrate amounts were emphasized.   60 carbs per meal 3 meals a day  15-20 carbs per snacks 3 snacks per day.   Patient has not formally seen a dietitian. Patient was given written materials including Nutrition in the [employer].   [employer] Pal Sabrina explained and demonstrated to patient.     Medication Instructions and Plan:     Current Outpatient Medications:   •  amLODIPine (NORVASC) 2.5 MG tablet, Take 1 tablet by mouth Daily., Disp: 90 tablet, Rfl: 3  •  aspirin 81 MG chewable tablet, Chew 81 mg Daily., Disp: , Rfl:   •  atorvastatin (LIPITOR) 40 MG tablet, Take 40 mg by mouth Daily., Disp: , Rfl:   •  eszopiclone (LUNESTA) 2 MG tablet, Take 2 mg by mouth As Needed for Sleep. Take immediately before bedtime , Disp: , Rfl:   •  glucose blood test strip, Use as instructed, Disp: 50 each, Rfl: 5  •  Lancets misc, 1 each 2 (two) times a day., Disp: 50 each, Rfl: 5  •  losartan (COZAAR) 100 MG tablet, Take 1 tablet by mouth Daily., Disp: 90 tablet, Rfl: 3  •  metFORMIN (Glucophage) 500 MG tablet, Take 1 tablet by mouth 2 (Two) Times a Day With Meals., Disp: 60 tablet, Rfl: 3  •  metoprolol tartrate (LOPRESSOR) 50 MG tablet, Take 1.5 tablets by mouth 2 (Two) Times a Day., Disp: 270 tablet, Rfl: 3  •  SITagliptin (Januvia) 100 MG tablet, Take 1 tablet by mouth Daily., Disp: 30 tablet, Rfl: 3   Medication list was reviewed with patient. Patient denies questions or concerns regarding current medication therapy. Patient was instructed to continue medications as prescribed.     Exercise:   Patient has been instructed to walk for 10 minutes after each meal initially and build strength and endurance to achieve 30 minutes of sustained exercise per day; recommended patient seek advice from Primary Care Provider prior to beginning any exercise program if other health concerns exist.      Problem solving and reducing risks:   I explained the importance of keeping provider appointments, taking medications as prescribed, having laboratory work performed as ordered by provider and seeking care as soon as possible when complications occur.  We also discussed blurry vision.  We discussed yearly eye appointments with ophthalmologist and having yearly appointments with the podiatrist.    Follow up Instructions and Plan: Patient is scheduled with PEEWEE Franz R.D. and Shirley CHAVEZ    Other: Patient works at [employer]  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  These shifts can be 5 AM to 5 PM or 5 PM to 5 AM.    Start Time: 14: 02  End Time: 15: 00    Richa Orellana RN, BSN  09/07/2021

## 2021-09-16 ENCOUNTER — NUTRITION (OUTPATIENT)
Dept: DIABETES SERVICES | Facility: HOSPITAL | Age: 56
End: 2021-09-16

## 2021-09-16 DIAGNOSIS — E11.65 UNCONTROLLED TYPE 2 DIABETES MELLITUS WITH HYPERGLYCEMIA (HCC): Primary | ICD-10-CM

## 2021-09-16 PROCEDURE — 97802 MEDICAL NUTRITION INDIV IN: CPT | Performed by: DIETITIAN, REGISTERED

## 2021-09-20 VITALS — HEIGHT: 69 IN | BODY MASS INDEX: 40.39 KG/M2 | WEIGHT: 272.71 LBS

## 2021-09-20 NOTE — PROGRESS NOTES
"Marco Singletary presents to [facility] for nutrition consult r/t diagnosis of T2DM, newly dx.     General Information  General Information   Referral From:: MD live  Height: 175.3 cm (69\")  Height Method: Actual  Weight: 124 kg (272 lb 11.3 oz)  Weight Method: Standing scale  Is patient pregnant?: n/a    Diabetes History  Diabetes History  What type of diabetes do you have?: Type 2  Length of Diabetes Diagnosis: Newly diagnosed <6 months  Current DM knowledge: good  Do you test your blood sugar at home?: yes  Frequency of checks: 2x/day  Typical readings: fasting- 170-180; bedtime- 130-180    Education Preferences  Education Preferences  What areas of diabetes would you like to learn about?: diet information    Nutrition Information  Nutrition Information  Enter everything you can remember eating in the last 24 hours (1 day): breakfast- Atkins Power Bar, coffee; lunch- chicken salad; dinner- same; snacks- almonds, fruit; beverages- water, coffee  How many meals do you eat each day?: 3  How many snacks do you eat each day?: 1  What is the biggest challenge you have with your diet?: Portions, Knowledge    Education Needs  DM Education Needs  Meter: Has own  Medication: Oral  Reducing Risks: A1C testing  Healthy Eating: RD consult, Reviewed meal plan, Basic meal plan provided  Physical Activity: Biking  Physical Activity Frequency: Occasionally  Motivation: Engaged  Teaching Method: Explanation, Discussion, Handouts  Patient Response: Verbalized understanding    Medications  Pt reports taking Metformin and Januvia for glucose mgmt.    Labs   Lab Results   Component Value Date    HGBA1C 10.80 (H) 08/18/2021        Nutrition counseling provided on carbohydrate counting, portion control, measuring and reading labels. Discussed eating out and gave suggestions on controlling carbohydrate intake and making healthier food choices.     Meal Plan:   Total Carbohydrates per meal: 3-4 carb " servings/meal, at least 3 meals/day  Lean protein with meals.  Limit added fats.  Snacks: 1 carbohydrate serving (</= 22 g) + 1 protein serving.     Daily exercise encouraged (as recommended by healthcare provider). Discussed the befits of exercise in lowering blood glucose, blood pressure, cholesterol, stress and controlling body weight.     Advised to continue daily blood glucose monitoring to assist with understanding of factors affecting blood glucose and assist with management of diabetes.  Discussed and provided with target BG ranges.     Literature provided: Diabetes Nutrition Placemat, Choose Your Foods Booklet    Phone number provided and encouraged to call with questions or concerns.     Time spent with patient: 30 minutes    Marleny Hernandez RDN, LD  09/16/2021

## 2021-09-29 ENCOUNTER — OFFICE VISIT (OUTPATIENT)
Dept: DIABETES SERVICES | Facility: HOSPITAL | Age: 56
End: 2021-09-29

## 2021-09-29 VITALS
HEIGHT: 69 IN | DIASTOLIC BLOOD PRESSURE: 93 MMHG | BODY MASS INDEX: 40.52 KG/M2 | TEMPERATURE: 98.9 F | WEIGHT: 273.59 LBS | OXYGEN SATURATION: 96 % | SYSTOLIC BLOOD PRESSURE: 150 MMHG | HEART RATE: 76 BPM

## 2021-09-29 DIAGNOSIS — E11.65 UNCONTROLLED TYPE 2 DIABETES MELLITUS WITH HYPERGLYCEMIA (HCC): Primary | ICD-10-CM

## 2021-09-29 DIAGNOSIS — E66.01 SEVERE OBESITY (BMI >= 40) (HCC): ICD-10-CM

## 2021-09-29 LAB — GLUCOSE BLDC GLUCOMTR-MCNC: 147 MG/DL (ref 70–99)

## 2021-09-29 PROCEDURE — G0463 HOSPITAL OUTPT CLINIC VISIT: HCPCS | Performed by: NURSE PRACTITIONER

## 2021-09-29 PROCEDURE — 99214 OFFICE O/P EST MOD 30 MIN: CPT | Performed by: NURSE PRACTITIONER

## 2021-09-29 PROCEDURE — 82962 GLUCOSE BLOOD TEST: CPT | Performed by: NURSE PRACTITIONER

## 2021-11-17 ENCOUNTER — OFFICE VISIT (OUTPATIENT)
Dept: DIABETES SERVICES | Facility: HOSPITAL | Age: 56
End: 2021-11-17

## 2021-11-17 VITALS
HEIGHT: 69 IN | RESPIRATION RATE: 24 BRPM | HEART RATE: 68 BPM | SYSTOLIC BLOOD PRESSURE: 125 MMHG | BODY MASS INDEX: 39.67 KG/M2 | DIASTOLIC BLOOD PRESSURE: 82 MMHG | WEIGHT: 267.86 LBS | TEMPERATURE: 97.2 F | OXYGEN SATURATION: 99 %

## 2021-11-17 DIAGNOSIS — E66.01 SEVERE OBESITY (BMI 35.0-39.9) WITH COMORBIDITY (HCC): ICD-10-CM

## 2021-11-17 DIAGNOSIS — E11.65 UNCONTROLLED TYPE 2 DIABETES MELLITUS WITH HYPERGLYCEMIA (HCC): Primary | ICD-10-CM

## 2021-11-17 LAB
EXPIRATION DATE: ABNORMAL
HBA1C MFR BLD: 7.2 %
Lab: ABNORMAL

## 2021-11-17 PROCEDURE — 83036 HEMOGLOBIN GLYCOSYLATED A1C: CPT | Performed by: NURSE PRACTITIONER

## 2021-11-17 PROCEDURE — G0463 HOSPITAL OUTPT CLINIC VISIT: HCPCS | Performed by: NURSE PRACTITIONER

## 2021-11-17 PROCEDURE — 99213 OFFICE O/P EST LOW 20 MIN: CPT | Performed by: NURSE PRACTITIONER

## 2021-11-17 NOTE — PROGRESS NOTES
Chief Complaint  Diabetes (follow up and med refills)    Referred By: KATHY Vazquez presents to [facility] for diabetes medication management    History of Present Illness    Visit type:  follow-up  Diabetes type:  Type 2  Current diabetes status/concerns/issues: He denies any concerns regarding his diabetes today.  He feels like his glucose levels have been much better controlled recently  Other health concerns: No new health issues  Diabetes symptoms:    Polyuria: No   Polydipsia: No   Polyphagia: No   Blurred vision: No   Excessive fatigue: No  Diabetes complications:  Neuropathy:No  Nephropathy:No  Retinopathy:No  Amputation/Wounds:No  Gastroparesis:No  Cardiovascular Disease:Yes, CAD with hx of MI  Erectile Dysfunction:No  Hypoglycemia:  None reported at this time  Hypoglycemia Symptoms:  No hypoglycemia at this time  Current diabetes treatment:  Metformin 1000 mg twice a day and Januvia 100 mg every day  Blood glucose device:  Meter  Blood glucose monitoring frequency:  2 -3  Blood glucose range/average: His glucose log indicates glucose levels ranging between 80 and 151.  Postprandial glucose levels are staying below 150 on most occasions  Diet:  Avoids high carb/sweet foods, Diet drinks only  Activity:  Walking and biking    Past Medical History:   Diagnosis Date   • 3-vessel CAD     severe, now  s/p CABG   • GERD (gastroesophageal reflux disease)    • HLD (hyperlipidemia)    • Hypertension    • Leukocytosis     post-op   • Obesity    • CHAD (obstructive sleep apnea)     with use of CPAP   • STEMI (ST elevation myocardial infarction) (HCC)    • Type 2 diabetes mellitus (HCC)      Past Surgical History:   Procedure Laterality Date   • CORONARY ARTERY BYPASS GRAFT N/A 8/1/2019    Procedure: ERICA STERNOTOMY CORONARY ARTERY BYPASS GRAFT TIMES 5 USING LEFT INTERNAL MAMMARY ARTERY AND LEFT GREATER SAPHENOUS VEIN GRAFT PER ENDOSCOPIC  VEIN HARVESTING AND PRP;  Surgeon: Christiano Zapien MD;  [facility];  Service: Cardiothoracic   • HERNIA REPAIR Right     inguinal      Family History   Problem Relation Age of Onset   • Dysrhythmia Father    • Hypertension Father    • Hypertension Mother    • Hypertension Maternal Grandmother    • Hypertension Maternal Grandfather      Social History     Socioeconomic History   • Marital status:    Tobacco Use   • Smoking status: Never Smoker   • Smokeless tobacco: Never Used   • Tobacco comment: caffeine use - 1 cup coffee daily   Vaping Use   • Vaping Use: Never used   Substance and Sexual Activity   • Alcohol use: No   • Drug use: No   • Sexual activity: Defer     Allergies   Allergen Reactions   • Bee Venom Swelling       Current Outpatient Medications:   •  amLODIPine (NORVASC) 2.5 MG tablet, Take 1 tablet by mouth Daily., Disp: 90 tablet, Rfl: 3  •  aspirin 81 MG chewable tablet, Chew 81 mg Daily., Disp: , Rfl:   •  atorvastatin (LIPITOR) 40 MG tablet, Take 40 mg by mouth Daily., Disp: , Rfl:   •  eszopiclone (LUNESTA) 2 MG tablet, Take 2 mg by mouth As Needed for Sleep. Take immediately before bedtime , Disp: , Rfl:   •  glucose blood test strip, Use as instructed, Disp: 50 each, Rfl: 5  •  Lancets misc, 1 each 2 (two) times a day., Disp: 50 each, Rfl: 5  •  losartan (COZAAR) 100 MG tablet, Take 1 tablet by mouth Daily., Disp: 90 tablet, Rfl: 3  •  metFORMIN (Glucophage) 500 MG tablet, Take 2 tablets by mouth 2 (Two) Times a Day With Meals., Disp: 360 tablet, Rfl: 1  •  metoprolol tartrate (LOPRESSOR) 50 MG tablet, Take 1.5 tablets by mouth 2 (Two) Times a Day., Disp: 270 tablet, Rfl: 3  •  SITagliptin (Januvia) 100 MG tablet, Take 1 tablet by mouth Daily., Disp: 90 tablet, Rfl: 1    Review of Systems   Constitutional: Negative for activity change, appetite change, fatigue, fever, unexpected weight gain and unexpected weight loss.   HENT: Negative for congestion, ear pain, facial  "swelling, hearing loss, sore throat and tinnitus.    Eyes: Negative for blurred vision, double vision, redness and visual disturbance.   Respiratory: Negative for cough, shortness of breath and wheezing.    Cardiovascular: Negative for chest pain, palpitations and leg swelling.   Gastrointestinal: Negative for abdominal distention, constipation, diarrhea, nausea, vomiting, GERD and indigestion.   Endocrine: Negative for polydipsia, polyphagia and polyuria.   Genitourinary: Negative for difficulty urinating, frequency and urgency.   Musculoskeletal: Negative for back pain, gait problem and myalgias.   Skin: Negative for rash, skin lesions and bruise.   Neurological: Negative for seizures, speech difficulty, weakness, headache and confusion.   Psychiatric/Behavioral: Negative for sleep disturbance, depressed mood and stress. The patient is not nervous/anxious.         Objective     Vitals:    11/17/21 1440   BP: 125/82   BP Location: Left arm   Patient Position: Sitting   Cuff Size: Adult   Pulse: 68   Resp: 24   Temp: 97.2 °F (36.2 °C)   SpO2: 99%   Weight: 121 kg (267 lb 13.7 oz)   Height: 175.3 cm (69\")   PainSc: 0-No pain     Body mass index is 39.56 kg/m².    Physical Exam  Constitutional:       Appearance: Normal appearance. He is obese.      Comments: Severe obesity with comorbidities of diabetes, hyperlipidemia, hypertension, sleep apnea with BMI of 39.56   HENT:      Head: Normocephalic and atraumatic.      Right Ear: External ear normal.      Left Ear: External ear normal.      Nose: Nose normal.   Eyes:      Extraocular Movements: Extraocular movements intact.      Conjunctiva/sclera: Conjunctivae normal.   Pulmonary:      Effort: Pulmonary effort is normal.   Musculoskeletal:         General: Normal range of motion.      Cervical back: Normal range of motion.   Skin:     General: Skin is warm and dry.   Neurological:      General: No focal deficit present.      Mental Status: He is alert and oriented to " person, place, and time. Mental status is at baseline.   Psychiatric:         Mood and Affect: Mood normal.         Behavior: Behavior normal.         Thought Content: Thought content normal.         Judgment: Judgment normal.         Result Review :   The following data was reviewed by: KATHY Thompson on 11/17/2021:        Point-of-care A1c collected in the office today was 7.2% indicating uncontrolled type 2 diabetes.  This is a significant improvement in his A1c by comparison to the result of 10.8% collected in August.    Most Recent A1C    HGBA1C Most Recent 11/17/21   Hemoglobin A1C 7.2             A1C Last 3 Results    HGBA1C Last 3 Results 8/18/21 11/17/21   Hemoglobin A1C 10.80 (A) 7.2   (A) Abnormal value                    Assessment: The patient has had a significant improvement in his A1c.  He has implemented physical activity and dietary strategies along with his medication management.  He has lost 6 pounds since his last appointment.      Diagnoses and all orders for this visit:    1. Uncontrolled type 2 diabetes mellitus with hyperglycemia (HCC) (Primary)  -     POC Glycosylated Hemoglobin (Hb A1C)    2. Severe obesity (BMI 35.0-39.9) with comorbidity (HCC)        Plan: No changes were made to his treatment plan today.  Patient will continue to focus on dietary and physical activity strategies to control glucose levels as well as continued weight loss.  He will be scheduled for routine follow-up appointment    The patient will monitor his blood glucose levels 2-3 times each day.  If he develops hypoglycemia or experiences any increased frequency or severity of hypoglycemia, he will contact the office for further instructions.        Follow Up     Return in about 3 months (around 2/17/2022) for Medication Management.    Patient was given instructions and counseling regarding his condition or for health maintenance advice. Please see specific information pulled into the AVS if appropriate.      Shirley Pickering, APRN  11/17/2021

## 2021-11-30 ENCOUNTER — OFFICE VISIT (OUTPATIENT)
Dept: FAMILY MEDICINE CLINIC | Facility: CLINIC | Age: 56
End: 2021-11-30

## 2021-11-30 VITALS
DIASTOLIC BLOOD PRESSURE: 94 MMHG | HEART RATE: 76 BPM | WEIGHT: 263 LBS | BODY MASS INDEX: 38.95 KG/M2 | HEIGHT: 69 IN | OXYGEN SATURATION: 97 % | SYSTOLIC BLOOD PRESSURE: 145 MMHG

## 2021-11-30 DIAGNOSIS — Z23 NEED FOR SHINGLES VACCINE: Primary | ICD-10-CM

## 2021-11-30 DIAGNOSIS — E11.65 TYPE 2 DIABETES MELLITUS WITH HYPERGLYCEMIA, WITHOUT LONG-TERM CURRENT USE OF INSULIN (HCC): ICD-10-CM

## 2021-11-30 DIAGNOSIS — I10 PRIMARY HYPERTENSION: ICD-10-CM

## 2021-11-30 DIAGNOSIS — Z23 NEED FOR INFLUENZA VACCINATION: ICD-10-CM

## 2021-11-30 DIAGNOSIS — E78.2 MIXED HYPERLIPIDEMIA: ICD-10-CM

## 2021-11-30 DIAGNOSIS — Z00.00 ANNUAL PHYSICAL EXAM: Primary | ICD-10-CM

## 2021-11-30 PROCEDURE — 99396 PREV VISIT EST AGE 40-64: CPT | Performed by: NURSE PRACTITIONER

## 2021-11-30 PROCEDURE — 90471 IMMUNIZATION ADMIN: CPT | Performed by: NURSE PRACTITIONER

## 2021-11-30 PROCEDURE — 90686 IIV4 VACC NO PRSV 0.5 ML IM: CPT | Performed by: NURSE PRACTITIONER

## 2021-11-30 RX ORDER — AMIODARONE HYDROCHLORIDE 200 MG/1
200 TABLET ORAL DAILY
COMMUNITY

## 2021-11-30 RX ORDER — GUAIFENESIN 600 MG/1
600 TABLET, EXTENDED RELEASE ORAL
COMMUNITY

## 2021-11-30 RX ORDER — BLOOD-GLUCOSE METER
EACH MISCELLANEOUS SEE ADMIN INSTRUCTIONS
COMMUNITY
Start: 2021-09-02

## 2022-02-01 RX ORDER — ATORVASTATIN CALCIUM 40 MG/1
TABLET, FILM COATED ORAL
Qty: 90 TABLET | Refills: 3 | Status: SHIPPED | OUTPATIENT
Start: 2022-02-01 | End: 2023-01-24

## 2022-02-22 ENCOUNTER — APPOINTMENT (OUTPATIENT)
Dept: DIABETES SERVICES | Facility: HOSPITAL | Age: 57
End: 2022-02-22

## 2022-03-07 RX ORDER — SITAGLIPTIN 100 MG/1
TABLET, FILM COATED ORAL
Qty: 90 TABLET | Refills: 3 | Status: SHIPPED | OUTPATIENT
Start: 2022-03-07

## 2022-03-18 ENCOUNTER — APPOINTMENT (OUTPATIENT)
Dept: DIABETES SERVICES | Facility: HOSPITAL | Age: 57
End: 2022-03-18

## 2022-07-28 RX ORDER — METOPROLOL TARTRATE 50 MG/1
TABLET, FILM COATED ORAL
Qty: 270 TABLET | Refills: 3 | Status: SHIPPED | OUTPATIENT
Start: 2022-07-28

## 2022-07-28 RX ORDER — AMLODIPINE BESYLATE 2.5 MG/1
TABLET ORAL
Qty: 90 TABLET | Refills: 3 | Status: SHIPPED | OUTPATIENT
Start: 2022-07-28

## 2022-07-28 RX ORDER — LOSARTAN POTASSIUM 100 MG/1
TABLET ORAL
Qty: 90 TABLET | Refills: 3 | Status: SHIPPED | OUTPATIENT
Start: 2022-07-28

## 2022-11-22 RX ORDER — BLOOD SUGAR DIAGNOSTIC
STRIP MISCELLANEOUS
Qty: 100 EACH | Refills: 3 | Status: SHIPPED | OUTPATIENT
Start: 2022-11-22

## 2023-01-24 RX ORDER — ATORVASTATIN CALCIUM 40 MG/1
TABLET, FILM COATED ORAL
Qty: 90 TABLET | Refills: 3 | Status: SHIPPED | OUTPATIENT
Start: 2023-01-24

## 2023-02-28 RX ORDER — SITAGLIPTIN 100 MG/1
TABLET, FILM COATED ORAL
Qty: 90 TABLET | Refills: 3 | OUTPATIENT
Start: 2023-02-28

## 2023-07-24 RX ORDER — AMLODIPINE BESYLATE 2.5 MG/1
TABLET ORAL
Qty: 90 TABLET | Refills: 3 | Status: SHIPPED | OUTPATIENT
Start: 2023-07-24

## 2023-07-24 RX ORDER — LOSARTAN POTASSIUM 100 MG/1
TABLET ORAL
Qty: 90 TABLET | Refills: 3 | Status: SHIPPED | OUTPATIENT
Start: 2023-07-24

## 2023-07-24 RX ORDER — METOPROLOL TARTRATE 50 MG/1
TABLET, FILM COATED ORAL
Qty: 270 TABLET | Refills: 3 | Status: SHIPPED | OUTPATIENT
Start: 2023-07-24

## 2024-01-19 ENCOUNTER — TELEPHONE (OUTPATIENT)
Dept: CARDIOLOGY | Facility: CLINIC | Age: 59
End: 2024-01-19
Payer: COMMERCIAL

## 2024-01-19 RX ORDER — ATORVASTATIN CALCIUM 40 MG/1
TABLET, FILM COATED ORAL
Qty: 90 TABLET | Refills: 0 | Status: SHIPPED | OUTPATIENT
Start: 2024-01-19

## 2024-01-19 NOTE — TELEPHONE ENCOUNTER
This patient has not been seen since 2021. Overdue for labs and an office visit. I will give 3 month supply but no refills until seen here again      Scheduling- please call him to discuss and schedule. I see he is a former Dr. Tong patient last seen by me in 2021

## 2024-04-19 RX ORDER — ATORVASTATIN CALCIUM 40 MG/1
TABLET, FILM COATED ORAL
Qty: 90 TABLET | Refills: 3 | Status: SHIPPED | OUTPATIENT
Start: 2024-04-19

## 2024-06-17 NOTE — TELEPHONE ENCOUNTER
Please contact his PCP who uses [facility] in [facility] for most recent lipid panel for me to review. I will need to call him and discuss if med adjustment is needed.   Stable  Meds same

## 2024-07-17 ENCOUNTER — TELEPHONE (OUTPATIENT)
Dept: CARDIOLOGY | Facility: CLINIC | Age: 59
End: 2024-07-17
Payer: COMMERCIAL

## 2024-07-17 RX ORDER — METOPROLOL TARTRATE 50 MG/1
TABLET, FILM COATED ORAL
Qty: 270 TABLET | Refills: 1 | Status: SHIPPED | OUTPATIENT
Start: 2024-07-17

## 2024-07-17 RX ORDER — LOSARTAN POTASSIUM 100 MG/1
TABLET ORAL
Qty: 90 TABLET | Refills: 1 | Status: SHIPPED | OUTPATIENT
Start: 2024-07-17

## 2024-07-17 RX ORDER — AMLODIPINE BESYLATE 2.5 MG/1
TABLET ORAL
Qty: 90 TABLET | Refills: 1 | Status: SHIPPED | OUTPATIENT
Start: 2024-07-17

## 2024-07-17 NOTE — TELEPHONE ENCOUNTER
This patient has not been seen since 2021.  Please call him to discuss scheduling routine follow-up appointment for future refills.  I will give 6-month supply at this time.

## 2024-07-18 NOTE — TELEPHONE ENCOUNTER
07/18/2024    Called and left a voicemail for this patient to return call to office to reschedule this appointment.     Thanks  Álvaro

## 2025-01-13 ENCOUNTER — TELEPHONE (OUTPATIENT)
Dept: CARDIOLOGY | Facility: CLINIC | Age: 60
End: 2025-01-13
Payer: COMMERCIAL

## 2025-01-13 RX ORDER — LOSARTAN POTASSIUM 100 MG/1
TABLET ORAL
Qty: 270 EACH | Refills: 0 | Status: SHIPPED | OUTPATIENT
Start: 2025-01-13

## 2025-01-13 RX ORDER — AMLODIPINE BESYLATE 2.5 MG/1
TABLET ORAL
Qty: 270 EACH | Refills: 0 | Status: SHIPPED | OUTPATIENT
Start: 2025-01-13

## 2025-01-13 RX ORDER — METOPROLOL TARTRATE 50 MG
TABLET ORAL
Qty: 810 EACH | Refills: 0 | Status: SHIPPED | OUTPATIENT
Start: 2025-01-13

## 2025-01-13 NOTE — TELEPHONE ENCOUNTER
Patient's appointment March 2024 was canceled via interface.  Please contact patient to discuss need for routine follow-up in order to continue getting refills.

## 2025-01-16 NOTE — TELEPHONE ENCOUNTER
Patient going out of town. Scheduled for April with Dr. Paredes. Is considered a new patient again.

## 2025-04-11 RX ORDER — ATORVASTATIN CALCIUM 40 MG/1
40 TABLET, FILM COATED ORAL EVERY EVENING
Qty: 90 TABLET | Refills: 3 | Status: SHIPPED | OUTPATIENT
Start: 2025-04-11

## (undated) DEVICE — CANN AORT ROOT DLP VNT 14G 7F

## (undated) DEVICE — CATHETER,SUCTION,14FR,WHISTL,STR PK,CAL: Brand: MEDLINE

## (undated) DEVICE — SENSR CERBRL O2 PK/2

## (undated) DEVICE — OPTIFOAM GENTLE SA, POSTOP, 4X12: Brand: MEDLINE

## (undated) DEVICE — Device

## (undated) DEVICE — GLV SURG BIOGEL LTX PF 6 1/2

## (undated) DEVICE — ADHS SKIN DERMABOND TOP ADVANCED

## (undated) DEVICE — GLV SURG BIOGEL LTX PF 7

## (undated) DEVICE — PK HEART OPN 40

## (undated) DEVICE — TEMP PACING WIRE: Brand: MYO/WIRE

## (undated) DEVICE — BLOWER/MISTER AXIOUS OPCAB W/TBG

## (undated) DEVICE — GLV SURG SENSICARE PI LF PF 7.5 GRN STRL

## (undated) DEVICE — CVR PROB 96IN LF STRL

## (undated) DEVICE — CANN VESL FREE FLO 2MM

## (undated) DEVICE — ACCESSRAIL PLATFORM (STANDARD BLADE): Brand: ACCESSRAIL PLATFORM (STANDARD BLADE)

## (undated) DEVICE — CLAMP INSERT: Brand: STEALTH® CLAMP INSERT

## (undated) DEVICE — TBG ART PRESS 60 IN

## (undated) DEVICE — BIOPATCH™ ANTIMICROBIAL DRESSING WITH CHLORHEXIDINE GLUCONATE IS A HYDROPHILLIC POLYURETHANE ABSORPTIVE FOAM WITH CHLORHEXIDINE GLUCONATE (CHG) WHICH INHIBITS BACTERIAL GROWTH UNDER THE DRESSING. THE DRESSING IS INTENDED TO BE USED TO ABSORB EXUDATE, COVER A WOUND CAUSED BY VASCULAR AND NONVASCULAR PERCUTANEOUS MEDICAL DEVICES DURING SURGERY, AS WELL AS REDUCE LOCAL INFECTION AND COLONIZATION OF MICROORGANISMS.: Brand: BIOPATCH

## (undated) DEVICE — GOWN ,SIRUS,NONREINFORCED 3XL: Brand: MEDLINE

## (undated) DEVICE — CATH IV INSYTE AUTOGARD SHLD 20G 1.88IN

## (undated) DEVICE — SOL ISO/ALC RUB 70PCT 4OZ

## (undated) DEVICE — DRP SLUSH MACH FOR STND ALONE OM-ORS-321

## (undated) DEVICE — SPNG DISECTOR KTNER XRAY COTN 1/4X9/16IN PK/5

## (undated) DEVICE — PK PERFUS CUST W/CARDIOPLEGIA

## (undated) DEVICE — INSUFFLATION TUBING,LAPAROSCOPIC: Brand: DEROYAL

## (undated) DEVICE — 28 FR STRAIGHT – SOFT PVC CATHETER: Brand: PVC THORACIC CATHETERS

## (undated) DEVICE — HEMOCONCENTRATOR PERFUS LPS06

## (undated) DEVICE — DRSNG SURESITE WNDW 2.38X2.75

## (undated) DEVICE — ST PERFUS M/

## (undated) DEVICE — Device: Brand: MEDEX

## (undated) DEVICE — SPNG GZ WOVN 4X4IN 12PLY 10/BX STRL

## (undated) DEVICE — CANN ART DLP 1PC EDPA A/ 22F

## (undated) DEVICE — 3M™ MEDIPORE™ H SOFT CLOTH SURGICAL TAPE 2862, 2 INCH X 10 YARD (5CM X 9,1M), 12 ROLLS/CASE: Brand: 3M™ MEDIPORE™

## (undated) DEVICE — 8 FOOT DISPOSABLE EXTENSION CABLE WITH SAFE CONNECT / ALLIGATOR CLIP

## (undated) DEVICE — MARKR SKIN W/RULR AND LBL

## (undated) DEVICE — NDL PERC 1PRT THNWALL W/BASEPLT 18G 7CM

## (undated) DEVICE — VASOVIEW HEMOPRO: Brand: VASOVIEW HEMOPRO

## (undated) DEVICE — SCANLAN® VASCU-STATT® II SINGLE-USE BULLDOG CLAMP W/FIRMER CLAMPING PRESS - MIDI ANGLED 45° (YELLOW), CLAMPING PRESSURE 75-80 G (2/STERILE PKG): Brand: SCANLAN® VASCU-STATT® II SINGLE-USE BULLDOG CLAMP W/FIRMER CLAMPING PRESS

## (undated) DEVICE — GLV SURG BIOGEL LTX PF 8

## (undated) DEVICE — PK ATS CUST W CARDIOTOMY RESEVOIR

## (undated) DEVICE — SYS PERFUS SEP PLATLT W TIPS CUST

## (undated) DEVICE — ST TOURNI COMPL A/ 7IN

## (undated) DEVICE — BNDG ELAS ELITE V/CLOSE 6IN 5YD LF STRL

## (undated) DEVICE — ROTATING SURGICAL PUNCHES, 1 PER POUCH: Brand: A&E MEDICAL / ROTATING SURGICAL PUNCHES

## (undated) DEVICE — OASIS DRAIN, SINGLE, INLINE & ATS COMPATIBLE: Brand: OASIS

## (undated) DEVICE — MEDI-VAC YANKAUER SUCTION HANDLE W/BULBOUS TIP: Brand: CARDINAL HEALTH

## (undated) DEVICE — BNDG ELAS ELITE V/CLOSE 4IN 5YD LF STRL

## (undated) DEVICE — ST. SORBAVIEW ULTIMATE IJ SYSTEM A,C: Brand: CENTURION